# Patient Record
Sex: FEMALE | Race: WHITE | Employment: UNEMPLOYED | URBAN - METROPOLITAN AREA
[De-identification: names, ages, dates, MRNs, and addresses within clinical notes are randomized per-mention and may not be internally consistent; named-entity substitution may affect disease eponyms.]

---

## 2021-08-01 ENCOUNTER — APPOINTMENT (OUTPATIENT)
Dept: GENERAL RADIOLOGY | Age: 66
DRG: 234 | End: 2021-08-01
Attending: PHYSICIAN ASSISTANT
Payer: COMMERCIAL

## 2021-08-01 ENCOUNTER — HOSPITAL ENCOUNTER (OUTPATIENT)
Dept: NON INVASIVE DIAGNOSTICS | Age: 66
Discharge: HOME | End: 2021-08-01
Attending: THORACIC SURGERY (CARDIOTHORACIC VASCULAR SURGERY)

## 2021-08-01 ENCOUNTER — ANESTHESIA EVENT (OUTPATIENT)
Dept: CARDIOTHORACIC SURGERY | Age: 66
DRG: 234 | End: 2021-08-01
Payer: COMMERCIAL

## 2021-08-01 ENCOUNTER — HOSPITAL ENCOUNTER (INPATIENT)
Age: 66
LOS: 5 days | Discharge: HOME HEALTH | DRG: 234 | End: 2021-08-06
Attending: EMERGENCY MEDICINE | Admitting: THORACIC SURGERY (CARDIOTHORACIC VASCULAR SURGERY)
Payer: COMMERCIAL

## 2021-08-01 ENCOUNTER — APPOINTMENT (OUTPATIENT)
Dept: GENERAL RADIOLOGY | Age: 66
DRG: 234 | End: 2021-08-01
Attending: EMERGENCY MEDICINE
Payer: COMMERCIAL

## 2021-08-01 ENCOUNTER — ANESTHESIA (OUTPATIENT)
Dept: CARDIOTHORACIC SURGERY | Age: 66
DRG: 234 | End: 2021-08-01
Payer: COMMERCIAL

## 2021-08-01 DIAGNOSIS — I21.3 ST ELEVATION MYOCARDIAL INFARCTION (STEMI), UNSPECIFIED ARTERY (HCC): Primary | ICD-10-CM

## 2021-08-01 DIAGNOSIS — I25.119 CORONARY ARTERY DISEASE INVOLVING NATIVE HEART WITH ANGINA PECTORIS, UNSPECIFIED VESSEL OR LESION TYPE (HCC): ICD-10-CM

## 2021-08-01 DIAGNOSIS — Z95.1 S/P CABG X 2: ICD-10-CM

## 2021-08-01 PROBLEM — I25.10 CAD (CORONARY ARTERY DISEASE): Status: ACTIVE | Noted: 2021-08-01

## 2021-08-01 LAB
ADMINISTERED INITIALS, ADMINIT: NORMAL
ALBUMIN SERPL-MCNC: 3 G/DL (ref 3.5–5)
ALBUMIN SERPL-MCNC: 3.9 G/DL (ref 3.5–5)
ALBUMIN SERPL-MCNC: 4.3 G/DL (ref 3.5–5)
ALBUMIN/GLOB SERPL: 1.2 {RATIO} (ref 1.1–2.2)
ALBUMIN/GLOB SERPL: 1.7 {RATIO} (ref 1.1–2.2)
ALBUMIN/GLOB SERPL: 2 {RATIO} (ref 1.1–2.2)
ALP SERPL-CCNC: 44 U/L (ref 45–117)
ALP SERPL-CCNC: 47 U/L (ref 45–117)
ALP SERPL-CCNC: 87 U/L (ref 45–117)
ALT SERPL-CCNC: 24 U/L (ref 12–78)
ALT SERPL-CCNC: 28 U/L (ref 12–78)
ALT SERPL-CCNC: 33 U/L (ref 12–78)
ANION GAP SERPL CALC-SCNC: 5 MMOL/L (ref 5–15)
ANION GAP SERPL CALC-SCNC: 6 MMOL/L (ref 5–15)
ANION GAP SERPL CALC-SCNC: 8 MMOL/L (ref 5–15)
ANION GAP SERPL CALC-SCNC: 8 MMOL/L (ref 5–15)
APTT PPP: 23.5 SEC (ref 22.1–31)
APTT PPP: >130 SEC (ref 22.1–31)
ARTERIAL PATENCY WRIST A: ABNORMAL
AST SERPL-CCNC: 131 U/L (ref 15–37)
AST SERPL-CCNC: 25 U/L (ref 15–37)
AST SERPL-CCNC: 79 U/L (ref 15–37)
BASE DEFICIT BLDA-SCNC: 6.9 MMOL/L
BASOPHILS # BLD: 0 K/UL (ref 0–0.1)
BASOPHILS # BLD: 0.1 K/UL (ref 0–0.1)
BASOPHILS # BLD: 0.1 K/UL (ref 0–0.1)
BASOPHILS NFR BLD: 0 % (ref 0–1)
BASOPHILS NFR BLD: 0 % (ref 0–1)
BASOPHILS NFR BLD: 1 % (ref 0–1)
BDY SITE: ABNORMAL
BDY SITE: ABNORMAL
BILIRUB SERPL-MCNC: 0.2 MG/DL (ref 0.2–1)
BILIRUB SERPL-MCNC: 0.2 MG/DL (ref 0.2–1)
BILIRUB SERPL-MCNC: 0.3 MG/DL (ref 0.2–1)
BUN SERPL-MCNC: 12 MG/DL (ref 6–20)
BUN SERPL-MCNC: 15 MG/DL (ref 6–20)
BUN/CREAT SERPL: 14 (ref 12–20)
BUN/CREAT SERPL: 16 (ref 12–20)
CA-I BLD-SCNC: 1 MMOL/L (ref 1.13–1.32)
CALCIUM SERPL-MCNC: 7.1 MG/DL (ref 8.5–10.1)
CALCIUM SERPL-MCNC: 8.9 MG/DL (ref 8.5–10.1)
CALCIUM SERPL-MCNC: 9 MG/DL (ref 8.5–10.1)
CALCIUM SERPL-MCNC: 9.9 MG/DL (ref 8.5–10.1)
CHLORIDE SERPL-SCNC: 106 MMOL/L (ref 97–108)
CHLORIDE SERPL-SCNC: 114 MMOL/L (ref 97–108)
CHLORIDE SERPL-SCNC: 114 MMOL/L (ref 97–108)
CHLORIDE SERPL-SCNC: 115 MMOL/L (ref 97–108)
CO2 SERPL-SCNC: 22 MMOL/L (ref 21–32)
CO2 SERPL-SCNC: 22 MMOL/L (ref 21–32)
CO2 SERPL-SCNC: 25 MMOL/L (ref 21–32)
CO2 SERPL-SCNC: 25 MMOL/L (ref 21–32)
COHGB MFR BLD: 0.7 % (ref 1–2)
CREAT SERPL-MCNC: 0.84 MG/DL (ref 0.55–1.02)
CREAT SERPL-MCNC: 0.85 MG/DL (ref 0.55–1.02)
CREAT SERPL-MCNC: 0.88 MG/DL (ref 0.55–1.02)
CREAT SERPL-MCNC: 0.91 MG/DL (ref 0.55–1.02)
D50 ADMINISTERED, D50ADM: 0 ML
D50 ORDER, D50ORD: 0 ML
DIFFERENTIAL METHOD BLD: ABNORMAL
EOSINOPHIL # BLD: 0 K/UL (ref 0–0.4)
EOSINOPHIL # BLD: 0.1 K/UL (ref 0–0.4)
EOSINOPHIL # BLD: 0.2 K/UL (ref 0–0.4)
EOSINOPHIL NFR BLD: 0 % (ref 0–7)
EOSINOPHIL NFR BLD: 1 % (ref 0–7)
EOSINOPHIL NFR BLD: 1 % (ref 0–7)
ERYTHROCYTE [DISTWIDTH] IN BLOOD BY AUTOMATED COUNT: 12.4 % (ref 11.5–14.5)
ERYTHROCYTE [DISTWIDTH] IN BLOOD BY AUTOMATED COUNT: 12.6 % (ref 11.5–14.5)
ERYTHROCYTE [DISTWIDTH] IN BLOOD BY AUTOMATED COUNT: 12.7 % (ref 11.5–14.5)
ERYTHROCYTE [DISTWIDTH] IN BLOOD BY AUTOMATED COUNT: 12.7 % (ref 11.5–14.5)
GLOBULIN SER CALC-MCNC: 1.8 G/DL (ref 2–4)
GLOBULIN SER CALC-MCNC: 2 G/DL (ref 2–4)
GLOBULIN SER CALC-MCNC: 3.7 G/DL (ref 2–4)
GLSCOM COMMENTS: NORMAL
GLUCOSE BLD STRIP.AUTO-MCNC: 103 MG/DL (ref 65–117)
GLUCOSE BLD STRIP.AUTO-MCNC: 104 MG/DL (ref 65–117)
GLUCOSE BLD STRIP.AUTO-MCNC: 105 MG/DL (ref 65–117)
GLUCOSE BLD STRIP.AUTO-MCNC: 105 MG/DL (ref 65–117)
GLUCOSE BLD STRIP.AUTO-MCNC: 118 MG/DL (ref 65–117)
GLUCOSE BLD STRIP.AUTO-MCNC: 138 MG/DL (ref 65–117)
GLUCOSE BLD STRIP.AUTO-MCNC: 164 MG/DL (ref 65–117)
GLUCOSE SERPL-MCNC: 112 MG/DL (ref 65–100)
GLUCOSE SERPL-MCNC: 124 MG/DL (ref 65–100)
GLUCOSE SERPL-MCNC: 126 MG/DL (ref 65–100)
GLUCOSE SERPL-MCNC: 98 MG/DL (ref 65–100)
GLUCOSE, GLC: 103 MG/DL
GLUCOSE, GLC: 104 MG/DL
GLUCOSE, GLC: 105 MG/DL
GLUCOSE, GLC: 105 MG/DL
GLUCOSE, GLC: 118 MG/DL
GLUCOSE, GLC: 138 MG/DL
GLUCOSE, GLC: 164 MG/DL
HCO3 BLDA-SCNC: 19 MMOL/L (ref 22–26)
HCT VFR BLD AUTO: 26 % (ref 35–47)
HCT VFR BLD AUTO: 26.2 % (ref 35–47)
HCT VFR BLD AUTO: 27.5 % (ref 35–47)
HCT VFR BLD AUTO: 41.3 % (ref 35–47)
HGB BLD OXIMETRY-MCNC: 8.3 G/DL (ref 14–17)
HGB BLD-MCNC: 13.5 G/DL (ref 11.5–16)
HGB BLD-MCNC: 8.4 G/DL (ref 11.5–16)
HGB BLD-MCNC: 8.6 G/DL (ref 11.5–16)
HGB BLD-MCNC: 9 G/DL (ref 11.5–16)
HIGH TARGET, HITG: 130 MG/DL
HISTORY CHECKED?,CKHIST: NORMAL
IMM GRANULOCYTES # BLD AUTO: 0 K/UL (ref 0–0.04)
IMM GRANULOCYTES # BLD AUTO: 0.1 K/UL (ref 0–0.04)
IMM GRANULOCYTES # BLD AUTO: 0.2 K/UL (ref 0–0.04)
IMM GRANULOCYTES NFR BLD AUTO: 0 % (ref 0–0.5)
IMM GRANULOCYTES NFR BLD AUTO: 1 % (ref 0–0.5)
IMM GRANULOCYTES NFR BLD AUTO: 1 % (ref 0–0.5)
INR PPP: 1.2 (ref 0.9–1.1)
INSULIN ADMINSTERED, INSADM: 1.7 UNITS/HOUR
INSULIN ADMINSTERED, INSADM: 1.8 UNITS/HOUR
INSULIN ADMINSTERED, INSADM: 2.3 UNITS/HOUR
INSULIN ADMINSTERED, INSADM: 2.3 UNITS/HOUR
INSULIN ADMINSTERED, INSADM: 4.2 UNITS/HOUR
INSULIN ORDER, INSORD: 1.7 UNITS/HOUR
INSULIN ORDER, INSORD: 1.8 UNITS/HOUR
INSULIN ORDER, INSORD: 2.3 UNITS/HOUR
INSULIN ORDER, INSORD: 2.3 UNITS/HOUR
INSULIN ORDER, INSORD: 4.2 UNITS/HOUR
LOW TARGET, LOT: 95 MG/DL
LYMPHOCYTES # BLD: 0.9 K/UL (ref 0.8–3.5)
LYMPHOCYTES # BLD: 1.2 K/UL (ref 0.8–3.5)
LYMPHOCYTES # BLD: 1.7 K/UL (ref 0.8–3.5)
LYMPHOCYTES NFR BLD: 10 % (ref 12–49)
LYMPHOCYTES NFR BLD: 11 % (ref 12–49)
LYMPHOCYTES NFR BLD: 5 % (ref 12–49)
MAGNESIUM SERPL-MCNC: 2.6 MG/DL (ref 1.6–2.4)
MAGNESIUM SERPL-MCNC: 3 MG/DL (ref 1.6–2.4)
MAGNESIUM SERPL-MCNC: 3.2 MG/DL (ref 1.6–2.4)
MCH RBC QN AUTO: 31 PG (ref 26–34)
MCH RBC QN AUTO: 31.5 PG (ref 26–34)
MCH RBC QN AUTO: 31.5 PG (ref 26–34)
MCH RBC QN AUTO: 31.8 PG (ref 26–34)
MCHC RBC AUTO-ENTMCNC: 32.3 G/DL (ref 30–36.5)
MCHC RBC AUTO-ENTMCNC: 32.7 G/DL (ref 30–36.5)
MCHC RBC AUTO-ENTMCNC: 32.7 G/DL (ref 30–36.5)
MCHC RBC AUTO-ENTMCNC: 32.8 G/DL (ref 30–36.5)
MCV RBC AUTO: 94.7 FL (ref 80–99)
MCV RBC AUTO: 96 FL (ref 80–99)
MCV RBC AUTO: 97.2 FL (ref 80–99)
MCV RBC AUTO: 97.4 FL (ref 80–99)
METHGB MFR BLD: 0.2 % (ref 0–1.4)
MINUTES UNTIL NEXT BG, NBG: 60 MIN
MONOCYTES # BLD: 1 K/UL (ref 0–1)
MONOCYTES # BLD: 1.4 K/UL (ref 0–1)
MONOCYTES # BLD: 2.2 K/UL (ref 0–1)
MONOCYTES NFR BLD: 12 % (ref 5–13)
MONOCYTES NFR BLD: 8 % (ref 5–13)
MONOCYTES NFR BLD: 9 % (ref 5–13)
MULTIPLIER, MUL: 0.03
MULTIPLIER, MUL: 0.04
NEUTS SEG # BLD: 14.1 K/UL (ref 1.8–8)
NEUTS SEG # BLD: 15.3 K/UL (ref 1.8–8)
NEUTS SEG # BLD: 9 K/UL (ref 1.8–8)
NEUTS SEG NFR BLD: 78 % (ref 32–75)
NEUTS SEG NFR BLD: 80 % (ref 32–75)
NEUTS SEG NFR BLD: 82 % (ref 32–75)
NRBC # BLD: 0 K/UL (ref 0–0.01)
NRBC BLD-RTO: 0 PER 100 WBC
ORDER INITIALS, ORDINIT: NORMAL
OXYHGB MFR BLD: 86.3 % (ref 94–97)
PCO2 BLDA: 40 MMHG (ref 35–45)
PH BLDA: 7.3 [PH] (ref 7.35–7.45)
PLATELET # BLD AUTO: 156 K/UL (ref 150–400)
PLATELET # BLD AUTO: 167 K/UL (ref 150–400)
PLATELET # BLD AUTO: 170 K/UL (ref 150–400)
PLATELET # BLD AUTO: 277 K/UL (ref 150–400)
PMV BLD AUTO: 10.9 FL (ref 8.9–12.9)
PMV BLD AUTO: 11.1 FL (ref 8.9–12.9)
PMV BLD AUTO: 11.3 FL (ref 8.9–12.9)
PMV BLD AUTO: 11.4 FL (ref 8.9–12.9)
PO2 BLDA: 315 MMHG (ref 80–100)
POTASSIUM SERPL-SCNC: 3.9 MMOL/L (ref 3.5–5.1)
POTASSIUM SERPL-SCNC: 4 MMOL/L (ref 3.5–5.1)
POTASSIUM SERPL-SCNC: 4.1 MMOL/L (ref 3.5–5.1)
POTASSIUM SERPL-SCNC: 4.1 MMOL/L (ref 3.5–5.1)
PROT SERPL-MCNC: 4.8 G/DL (ref 6.4–8.2)
PROT SERPL-MCNC: 5.9 G/DL (ref 6.4–8.2)
PROT SERPL-MCNC: 8 G/DL (ref 6.4–8.2)
PROTHROMBIN TIME: 12.7 SEC (ref 9–11.1)
RBC # BLD AUTO: 2.67 M/UL (ref 3.8–5.2)
RBC # BLD AUTO: 2.73 M/UL (ref 3.8–5.2)
RBC # BLD AUTO: 2.83 M/UL (ref 3.8–5.2)
RBC # BLD AUTO: 4.36 M/UL (ref 3.8–5.2)
RBC MORPH BLD: ABNORMAL
SAO2 % BLD: 100 % (ref 92–97)
SAO2 % BLD: 87 % (ref 95–99)
SAO2% DEVICE SAO2% SENSOR NAME: ABNORMAL
SERVICE CMNT-IMP: ABNORMAL
SERVICE CMNT-IMP: NORMAL
SODIUM SERPL-SCNC: 139 MMOL/L (ref 136–145)
SODIUM SERPL-SCNC: 143 MMOL/L (ref 136–145)
SODIUM SERPL-SCNC: 144 MMOL/L (ref 136–145)
SODIUM SERPL-SCNC: 144 MMOL/L (ref 136–145)
SPECIMEN SITE: ABNORMAL
SPECIMEN SITE: ABNORMAL
THERAPEUTIC RANGE,PTTT: ABNORMAL SECS (ref 58–77)
THERAPEUTIC RANGE,PTTT: NORMAL SECS (ref 58–77)
TROPONIN I BLD-MCNC: 0.43 NG/ML (ref 0–0.08)
TROPONIN I SERPL-MCNC: 0.68 NG/ML
WBC # BLD AUTO: 11.4 K/UL (ref 3.6–11)
WBC # BLD AUTO: 17.5 K/UL (ref 3.6–11)
WBC # BLD AUTO: 18.1 K/UL (ref 3.6–11)
WBC # BLD AUTO: 18.6 K/UL (ref 3.6–11)

## 2021-08-01 PROCEDURE — 33517 CABG ARTERY-VEIN SINGLE: CPT | Performed by: PHYSICIAN ASSISTANT

## 2021-08-01 PROCEDURE — 77030041076 HC DRSG AG OPTICELL MDII -A: Performed by: THORACIC SURGERY (CARDIOTHORACIC VASCULAR SURGERY)

## 2021-08-01 PROCEDURE — 74011250636 HC RX REV CODE- 250/636: Performed by: THORACIC SURGERY (CARDIOTHORACIC VASCULAR SURGERY)

## 2021-08-01 PROCEDURE — 85730 THROMBOPLASTIN TIME PARTIAL: CPT

## 2021-08-01 PROCEDURE — B2111ZZ FLUOROSCOPY OF MULTIPLE CORONARY ARTERIES USING LOW OSMOLAR CONTRAST: ICD-10-PCS | Performed by: INTERNAL MEDICINE

## 2021-08-01 PROCEDURE — 77030008771 HC TU NG SALEM SUMP -A: Performed by: ANESTHESIOLOGY

## 2021-08-01 PROCEDURE — 31500 INSERT EMERGENCY AIRWAY: CPT

## 2021-08-01 PROCEDURE — 74011000258 HC RX REV CODE- 258: Performed by: THORACIC SURGERY (CARDIOTHORACIC VASCULAR SURGERY)

## 2021-08-01 PROCEDURE — 33517 CABG ARTERY-VEIN SINGLE: CPT | Performed by: THORACIC SURGERY (CARDIOTHORACIC VASCULAR SURGERY)

## 2021-08-01 PROCEDURE — 77030002933 HC SUT MCRYL J&J -A: Performed by: THORACIC SURGERY (CARDIOTHORACIC VASCULAR SURGERY)

## 2021-08-01 PROCEDURE — 74011000250 HC RX REV CODE- 250: Performed by: INTERNAL MEDICINE

## 2021-08-01 PROCEDURE — 74011250636 HC RX REV CODE- 250/636: Performed by: NURSE PRACTITIONER

## 2021-08-01 PROCEDURE — 77030008684 HC TU ET CUF COVD -B: Performed by: ANESTHESIOLOGY

## 2021-08-01 PROCEDURE — 99222 1ST HOSP IP/OBS MODERATE 55: CPT | Performed by: THORACIC SURGERY (CARDIOTHORACIC VASCULAR SURGERY)

## 2021-08-01 PROCEDURE — 77030019579 HC CBL PACE DISP REMG -B: Performed by: THORACIC SURGERY (CARDIOTHORACIC VASCULAR SURGERY)

## 2021-08-01 PROCEDURE — 77030041244 HC CBL PACE EXT TEMP REMG -B: Performed by: THORACIC SURGERY (CARDIOTHORACIC VASCULAR SURGERY)

## 2021-08-01 PROCEDURE — 77030008517 HC TBNG INSUF ENDO STOR -B: Performed by: THORACIC SURGERY (CARDIOTHORACIC VASCULAR SURGERY)

## 2021-08-01 PROCEDURE — 93460 R&L HRT ART/VENTRICLE ANGIO: CPT | Performed by: INTERNAL MEDICINE

## 2021-08-01 PROCEDURE — 4A023N8 MEASUREMENT OF CARDIAC SAMPLING AND PRESSURE, BILATERAL, PERCUTANEOUS APPROACH: ICD-10-PCS | Performed by: INTERNAL MEDICINE

## 2021-08-01 PROCEDURE — 76937 US GUIDE VASCULAR ACCESS: CPT | Performed by: INTERNAL MEDICINE

## 2021-08-01 PROCEDURE — 93005 ELECTROCARDIOGRAM TRACING: CPT

## 2021-08-01 PROCEDURE — 96375 TX/PRO/DX INJ NEW DRUG ADDON: CPT

## 2021-08-01 PROCEDURE — 2709999900 HC NON-CHARGEABLE SUPPLY: Performed by: THORACIC SURGERY (CARDIOTHORACIC VASCULAR SURGERY)

## 2021-08-01 PROCEDURE — 71045 X-RAY EXAM CHEST 1 VIEW: CPT

## 2021-08-01 PROCEDURE — 65620000000 HC RM CCU GENERAL

## 2021-08-01 PROCEDURE — 77030019697 HC SYR ANGI INFL MRTM -B: Performed by: INTERNAL MEDICINE

## 2021-08-01 PROCEDURE — 77030006689 HC BLD OPHTH BVR BD -A: Performed by: THORACIC SURGERY (CARDIOTHORACIC VASCULAR SURGERY)

## 2021-08-01 PROCEDURE — 82330 ASSAY OF CALCIUM: CPT

## 2021-08-01 PROCEDURE — 77030031403 HC IMPL MRKR GRFT CT SCAN -B: Performed by: THORACIC SURGERY (CARDIOTHORACIC VASCULAR SURGERY)

## 2021-08-01 PROCEDURE — 74011000636 HC RX REV CODE- 636: Performed by: INTERNAL MEDICINE

## 2021-08-01 PROCEDURE — 99153 MOD SED SAME PHYS/QHP EA: CPT | Performed by: INTERNAL MEDICINE

## 2021-08-01 PROCEDURE — 76010000110 HC CV SURG 3 TO 3.5 HR: Performed by: THORACIC SURGERY (CARDIOTHORACIC VASCULAR SURGERY)

## 2021-08-01 PROCEDURE — 74011250636 HC RX REV CODE- 250/636: Performed by: NURSE ANESTHETIST, CERTIFIED REGISTERED

## 2021-08-01 PROCEDURE — 74011000250 HC RX REV CODE- 250: Performed by: PHYSICIAN ASSISTANT

## 2021-08-01 PROCEDURE — 77030012390 HC DRN CHST BTL GTNG -B: Performed by: THORACIC SURGERY (CARDIOTHORACIC VASCULAR SURGERY)

## 2021-08-01 PROCEDURE — 94002 VENT MGMT INPAT INIT DAY: CPT

## 2021-08-01 PROCEDURE — 77030002987 HC SUT PROL J&J -B: Performed by: THORACIC SURGERY (CARDIOTHORACIC VASCULAR SURGERY)

## 2021-08-01 PROCEDURE — 74011250636 HC RX REV CODE- 250/636: Performed by: PHYSICIAN ASSISTANT

## 2021-08-01 PROCEDURE — C1894 INTRO/SHEATH, NON-LASER: HCPCS | Performed by: INTERNAL MEDICINE

## 2021-08-01 PROCEDURE — 96374 THER/PROPH/DIAG INJ IV PUSH: CPT

## 2021-08-01 PROCEDURE — 77030040361 HC SLV COMPR DVT MDII -B

## 2021-08-01 PROCEDURE — 03HY32Z INSERTION OF MONITORING DEVICE INTO UPPER ARTERY, PERCUTANEOUS APPROACH: ICD-10-PCS | Performed by: ANESTHESIOLOGY

## 2021-08-01 PROCEDURE — 77030005513 HC CATH URETH FOL11 MDII -B: Performed by: THORACIC SURGERY (CARDIOTHORACIC VASCULAR SURGERY)

## 2021-08-01 PROCEDURE — 77030022199 HC SYS HARV VESL GTNG -G1: Performed by: THORACIC SURGERY (CARDIOTHORACIC VASCULAR SURGERY)

## 2021-08-01 PROCEDURE — 74011000250 HC RX REV CODE- 250: Performed by: THORACIC SURGERY (CARDIOTHORACIC VASCULAR SURGERY)

## 2021-08-01 PROCEDURE — 80053 COMPREHEN METABOLIC PANEL: CPT

## 2021-08-01 PROCEDURE — 33508 ENDOSCOPIC VEIN HARVEST: CPT | Performed by: PHYSICIAN ASSISTANT

## 2021-08-01 PROCEDURE — 85025 COMPLETE CBC W/AUTO DIFF WBC: CPT

## 2021-08-01 PROCEDURE — 84484 ASSAY OF TROPONIN QUANT: CPT

## 2021-08-01 PROCEDURE — 36415 COLL VENOUS BLD VENIPUNCTURE: CPT

## 2021-08-01 PROCEDURE — 77030010605 HC BLWR MR MAL MEDT -B: Performed by: THORACIC SURGERY (CARDIOTHORACIC VASCULAR SURGERY)

## 2021-08-01 PROCEDURE — 33508 ENDOSCOPIC VEIN HARVEST: CPT | Performed by: THORACIC SURGERY (CARDIOTHORACIC VASCULAR SURGERY)

## 2021-08-01 PROCEDURE — 77030010818: Performed by: THORACIC SURGERY (CARDIOTHORACIC VASCULAR SURGERY)

## 2021-08-01 PROCEDURE — 2709999900 HC NON-CHARGEABLE SUPPLY: Performed by: INTERNAL MEDICINE

## 2021-08-01 PROCEDURE — 74011250637 HC RX REV CODE- 250/637: Performed by: EMERGENCY MEDICINE

## 2021-08-01 PROCEDURE — 33967 INSERT I-AORT PERCUT DEVICE: CPT | Performed by: INTERNAL MEDICINE

## 2021-08-01 PROCEDURE — 96365 THER/PROPH/DIAG IV INF INIT: CPT

## 2021-08-01 PROCEDURE — 77030010938 HC CLP LIG TELE -A: Performed by: THORACIC SURGERY (CARDIOTHORACIC VASCULAR SURGERY)

## 2021-08-01 PROCEDURE — 5A02210 ASSISTANCE WITH CARDIAC OUTPUT USING BALLOON PUMP, CONTINUOUS: ICD-10-PCS | Performed by: INTERNAL MEDICINE

## 2021-08-01 PROCEDURE — 77030013798 HC KT TRNSDUC PRSSR EDWD -B: Performed by: THORACIC SURGERY (CARDIOTHORACIC VASCULAR SURGERY)

## 2021-08-01 PROCEDURE — 74011000250 HC RX REV CODE- 250: Performed by: NURSE ANESTHETIST, CERTIFIED REGISTERED

## 2021-08-01 PROCEDURE — 85027 COMPLETE CBC AUTOMATED: CPT

## 2021-08-01 PROCEDURE — C1751 CATH, INF, PER/CENT/MIDLINE: HCPCS

## 2021-08-01 PROCEDURE — 33533 CABG ARTERIAL SINGLE: CPT | Performed by: PHYSICIAN ASSISTANT

## 2021-08-01 PROCEDURE — 74011000250 HC RX REV CODE- 250: Performed by: EMERGENCY MEDICINE

## 2021-08-01 PROCEDURE — P9047 ALBUMIN (HUMAN), 25%, 50ML: HCPCS | Performed by: THORACIC SURGERY (CARDIOTHORACIC VASCULAR SURGERY)

## 2021-08-01 PROCEDURE — 86901 BLOOD TYPING SEROLOGIC RH(D): CPT

## 2021-08-01 PROCEDURE — 77030006920 HC BLD STRNL SAW STRY -B: Performed by: THORACIC SURGERY (CARDIOTHORACIC VASCULAR SURGERY)

## 2021-08-01 PROCEDURE — 33533 CABG ARTERIAL SINGLE: CPT | Performed by: THORACIC SURGERY (CARDIOTHORACIC VASCULAR SURGERY)

## 2021-08-01 PROCEDURE — 85610 PROTHROMBIN TIME: CPT

## 2021-08-01 PROCEDURE — 77030002986 HC SUT PROL J&J -A: Performed by: THORACIC SURGERY (CARDIOTHORACIC VASCULAR SURGERY)

## 2021-08-01 PROCEDURE — 77030010389 HC WRE ATR PACE AEMC -B: Performed by: THORACIC SURGERY (CARDIOTHORACIC VASCULAR SURGERY)

## 2021-08-01 PROCEDURE — 83050 HGB METHEMOGLOBIN QUAN: CPT

## 2021-08-01 PROCEDURE — 99152 MOD SED SAME PHYS/QHP 5/>YRS: CPT | Performed by: INTERNAL MEDICINE

## 2021-08-01 PROCEDURE — C1751 CATH, INF, PER/CENT/MIDLINE: HCPCS | Performed by: THORACIC SURGERY (CARDIOTHORACIC VASCULAR SURGERY)

## 2021-08-01 PROCEDURE — 77030003010 HC SUT SURG STL J&J -B: Performed by: THORACIC SURGERY (CARDIOTHORACIC VASCULAR SURGERY)

## 2021-08-01 PROCEDURE — 74011250637 HC RX REV CODE- 250/637: Performed by: PHYSICIAN ASSISTANT

## 2021-08-01 PROCEDURE — 77030004549 HC CATH ANGI DX PRF MRTM -A: Performed by: INTERNAL MEDICINE

## 2021-08-01 PROCEDURE — 77030005402 HC CATH RAD ART LN KT TELE -B

## 2021-08-01 PROCEDURE — 86923 COMPATIBILITY TEST ELECTRIC: CPT

## 2021-08-01 PROCEDURE — 2709999900 HC NON-CHARGEABLE SUPPLY

## 2021-08-01 PROCEDURE — P9045 ALBUMIN (HUMAN), 5%, 250 ML: HCPCS | Performed by: NURSE PRACTITIONER

## 2021-08-01 PROCEDURE — 77030018729 HC ELECTRD DEFIB PAD CARD -B: Performed by: THORACIC SURGERY (CARDIOTHORACIC VASCULAR SURGERY)

## 2021-08-01 PROCEDURE — 77030006247 HC LD PCMKR MYOCRD BPLR TEMP MEDT -B: Performed by: THORACIC SURGERY (CARDIOTHORACIC VASCULAR SURGERY)

## 2021-08-01 PROCEDURE — 77030020263 HC SOL INJ SOD CL0.9% LFCR 1000ML: Performed by: THORACIC SURGERY (CARDIOTHORACIC VASCULAR SURGERY)

## 2021-08-01 PROCEDURE — 77030040504 HC DRN WND MDII -B: Performed by: THORACIC SURGERY (CARDIOTHORACIC VASCULAR SURGERY)

## 2021-08-01 PROCEDURE — 02HV33Z INSERTION OF INFUSION DEVICE INTO SUPERIOR VENA CAVA, PERCUTANEOUS APPROACH: ICD-10-PCS | Performed by: ANESTHESIOLOGY

## 2021-08-01 PROCEDURE — 74011636637 HC RX REV CODE- 636/637: Performed by: THORACIC SURGERY (CARDIOTHORACIC VASCULAR SURGERY)

## 2021-08-01 PROCEDURE — P9045 ALBUMIN (HUMAN), 5%, 250 ML: HCPCS | Performed by: PHYSICIAN ASSISTANT

## 2021-08-01 PROCEDURE — 77030034888 HC SUT PROL 2 J&J -B: Performed by: THORACIC SURGERY (CARDIOTHORACIC VASCULAR SURGERY)

## 2021-08-01 PROCEDURE — 74011250636 HC RX REV CODE- 250/636: Performed by: INTERNAL MEDICINE

## 2021-08-01 PROCEDURE — 77030037878 HC DRSG MEPILEX >48IN BORD MOLN -B: Performed by: THORACIC SURGERY (CARDIOTHORACIC VASCULAR SURGERY)

## 2021-08-01 PROCEDURE — 74011250636 HC RX REV CODE- 250/636: Performed by: EMERGENCY MEDICINE

## 2021-08-01 PROCEDURE — 99285 EMERGENCY DEPT VISIT HI MDM: CPT

## 2021-08-01 PROCEDURE — 83735 ASSAY OF MAGNESIUM: CPT

## 2021-08-01 PROCEDURE — 76060000037 HC ANESTHESIA 3 TO 3.5 HR: Performed by: THORACIC SURGERY (CARDIOTHORACIC VASCULAR SURGERY)

## 2021-08-01 PROCEDURE — 77030016151 HC PROTCTR LNS DFOG COVD -B: Performed by: THORACIC SURGERY (CARDIOTHORACIC VASCULAR SURGERY)

## 2021-08-01 PROCEDURE — 77030020061 HC IV BLD WRMR ADMIN SET 3M -B: Performed by: ANESTHESIOLOGY

## 2021-08-01 PROCEDURE — 77030016754 HC CATH BLN INTAORT1 GTNG -G: Performed by: INTERNAL MEDICINE

## 2021-08-01 PROCEDURE — 77030027138 HC INCENT SPIROMETER -A

## 2021-08-01 PROCEDURE — 82962 GLUCOSE BLOOD TEST: CPT

## 2021-08-01 PROCEDURE — 77030013861 HC PNCH AORT CLNCUT QUES -B: Performed by: THORACIC SURGERY (CARDIOTHORACIC VASCULAR SURGERY)

## 2021-08-01 PROCEDURE — 77030003020 HC SUT TICRN COVD -A: Performed by: THORACIC SURGERY (CARDIOTHORACIC VASCULAR SURGERY)

## 2021-08-01 PROCEDURE — 77030008771 HC TU NG SALEM SUMP -A: Performed by: THORACIC SURGERY (CARDIOTHORACIC VASCULAR SURGERY)

## 2021-08-01 PROCEDURE — 77030026438 HC STYL ET INTUB CARD -A: Performed by: ANESTHESIOLOGY

## 2021-08-01 DEVICE — 6 FOOT DISPOSABLE EXTENSION CABLE WITH SAFE CONNECT / SCREW-DOWN: Type: IMPLANTABLE DEVICE | Status: FUNCTIONAL

## 2021-08-01 DEVICE — TEMP PACING WIRE
Type: IMPLANTABLE DEVICE | Status: FUNCTIONAL
Brand: MYO/WIRE

## 2021-08-01 DEVICE — LEAD PCMKR MYOCARDL BPLR TEMP. --: Type: IMPLANTABLE DEVICE | Status: FUNCTIONAL

## 2021-08-01 RX ORDER — DEXTROSE 50 % IN WATER (D50W) INTRAVENOUS SYRINGE
25-50 AS NEEDED
Status: DISCONTINUED | OUTPATIENT
Start: 2021-08-01 | End: 2021-08-02 | Stop reason: SDUPTHER

## 2021-08-01 RX ORDER — NITROGLYCERIN 0.4 MG/1
0.4 TABLET SUBLINGUAL
Status: DISCONTINUED | OUTPATIENT
Start: 2021-08-01 | End: 2021-08-01

## 2021-08-01 RX ORDER — SODIUM CHLORIDE 9 MG/ML
INJECTION, SOLUTION INTRAVENOUS
Status: DISCONTINUED | OUTPATIENT
Start: 2021-08-01 | End: 2021-08-01 | Stop reason: HOSPADM

## 2021-08-01 RX ORDER — SODIUM CHLORIDE 0.9 % (FLUSH) 0.9 %
5-40 SYRINGE (ML) INJECTION AS NEEDED
Status: DISCONTINUED | OUTPATIENT
Start: 2021-08-01 | End: 2021-08-06 | Stop reason: HOSPADM

## 2021-08-01 RX ORDER — DEXMEDETOMIDINE HYDROCHLORIDE 4 UG/ML
.1-1.5 INJECTION, SOLUTION INTRAVENOUS
Status: DISCONTINUED | OUTPATIENT
Start: 2021-08-01 | End: 2021-08-02

## 2021-08-01 RX ORDER — DOBUTAMINE HYDROCHLORIDE 200 MG/100ML
0-10 INJECTION INTRAVENOUS
Status: DISCONTINUED | OUTPATIENT
Start: 2021-08-01 | End: 2021-08-03

## 2021-08-01 RX ORDER — ALBUMIN HUMAN 50 G/1000ML
12.5 SOLUTION INTRAVENOUS
Status: COMPLETED | OUTPATIENT
Start: 2021-08-01 | End: 2021-08-03

## 2021-08-01 RX ORDER — FENTANYL CITRATE 50 UG/ML
50 INJECTION, SOLUTION INTRAMUSCULAR; INTRAVENOUS
Status: COMPLETED | OUTPATIENT
Start: 2021-08-01 | End: 2021-08-01

## 2021-08-01 RX ORDER — SUFENTANIL CITRATE 50 UG/ML
INJECTION EPIDURAL; INTRAVENOUS
Status: DISCONTINUED | OUTPATIENT
Start: 2021-08-01 | End: 2021-08-01 | Stop reason: HOSPADM

## 2021-08-01 RX ORDER — ALBUTEROL SULFATE 0.83 MG/ML
2.5 SOLUTION RESPIRATORY (INHALATION)
Status: DISCONTINUED | OUTPATIENT
Start: 2021-08-01 | End: 2021-08-06 | Stop reason: HOSPADM

## 2021-08-01 RX ORDER — PAPAVERINE HYDROCHLORIDE 30 MG/ML
30 INJECTION INTRAMUSCULAR; INTRAVENOUS ONCE
Status: COMPLETED | OUTPATIENT
Start: 2021-08-01 | End: 2021-08-01

## 2021-08-01 RX ORDER — MIDAZOLAM HYDROCHLORIDE 1 MG/ML
1 INJECTION, SOLUTION INTRAMUSCULAR; INTRAVENOUS
Status: DISCONTINUED | OUTPATIENT
Start: 2021-08-01 | End: 2021-08-05

## 2021-08-01 RX ORDER — MAGNESIUM SULFATE 100 %
4 CRYSTALS MISCELLANEOUS AS NEEDED
Status: DISCONTINUED | OUTPATIENT
Start: 2021-08-01 | End: 2021-08-06 | Stop reason: HOSPADM

## 2021-08-01 RX ORDER — DESMOPRESSIN ACETATE 4 UG/ML
2 INJECTION, SOLUTION INTRAVENOUS; SUBCUTANEOUS ONCE
Status: DISCONTINUED | OUTPATIENT
Start: 2021-08-01 | End: 2021-08-01

## 2021-08-01 RX ORDER — DOPAMINE HYDROCHLORIDE 320 MG/100ML
0-20 INJECTION, SOLUTION INTRAVENOUS
Status: DISCONTINUED | OUTPATIENT
Start: 2021-08-01 | End: 2021-08-01

## 2021-08-01 RX ORDER — AMOXICILLIN 250 MG
1 CAPSULE ORAL 2 TIMES DAILY
Status: DISCONTINUED | OUTPATIENT
Start: 2021-08-02 | End: 2021-08-06 | Stop reason: HOSPADM

## 2021-08-01 RX ORDER — DEXTROSE 50 % IN WATER (D50W) INTRAVENOUS SYRINGE
12.5-25 AS NEEDED
Status: DISCONTINUED | OUTPATIENT
Start: 2021-08-01 | End: 2021-08-06 | Stop reason: HOSPADM

## 2021-08-01 RX ORDER — HEPARIN SODIUM 5000 [USP'U]/ML
INJECTION, SOLUTION INTRAVENOUS; SUBCUTANEOUS
Status: DISCONTINUED
Start: 2021-08-01 | End: 2021-08-01

## 2021-08-01 RX ORDER — MAGNESIUM SULFATE 1 G/100ML
1 INJECTION INTRAVENOUS AS NEEDED
Status: DISCONTINUED | OUTPATIENT
Start: 2021-08-01 | End: 2021-08-06 | Stop reason: HOSPADM

## 2021-08-01 RX ORDER — SODIUM BICARBONATE 1 MEQ/ML
50 SYRINGE (ML) INTRAVENOUS ONCE
Status: COMPLETED | OUTPATIENT
Start: 2021-08-01 | End: 2021-08-01

## 2021-08-01 RX ORDER — MORPHINE SULFATE 4 MG/ML
4 INJECTION INTRAVENOUS
Status: DISCONTINUED | OUTPATIENT
Start: 2021-08-01 | End: 2021-08-05

## 2021-08-01 RX ORDER — FACIAL-BODY WIPES
10 EACH TOPICAL DAILY PRN
Status: DISCONTINUED | OUTPATIENT
Start: 2021-08-01 | End: 2021-08-06 | Stop reason: HOSPADM

## 2021-08-01 RX ORDER — POTASSIUM CHLORIDE 29.8 MG/ML
20 INJECTION INTRAVENOUS
Status: ACTIVE | OUTPATIENT
Start: 2021-08-01 | End: 2021-08-02

## 2021-08-01 RX ORDER — OXYCODONE HYDROCHLORIDE 5 MG/1
10 TABLET ORAL
Status: DISCONTINUED | OUTPATIENT
Start: 2021-08-01 | End: 2021-08-06 | Stop reason: HOSPADM

## 2021-08-01 RX ORDER — LIDOCAINE HYDROCHLORIDE 10 MG/ML
INJECTION INFILTRATION; PERINEURAL AS NEEDED
Status: DISCONTINUED | OUTPATIENT
Start: 2021-08-01 | End: 2021-08-01 | Stop reason: HOSPADM

## 2021-08-01 RX ORDER — LANOLIN ALCOHOL/MO/W.PET/CERES
400 CREAM (GRAM) TOPICAL 2 TIMES DAILY
Status: DISCONTINUED | OUTPATIENT
Start: 2021-08-02 | End: 2021-08-06 | Stop reason: HOSPADM

## 2021-08-01 RX ORDER — CEFAZOLIN SODIUM 1 G/3ML
INJECTION, POWDER, FOR SOLUTION INTRAMUSCULAR; INTRAVENOUS AS NEEDED
Status: DISCONTINUED | OUTPATIENT
Start: 2021-08-01 | End: 2021-08-01 | Stop reason: HOSPADM

## 2021-08-01 RX ORDER — HEPARIN SODIUM 200 [USP'U]/100ML
INJECTION, SOLUTION INTRAVENOUS
Status: COMPLETED | OUTPATIENT
Start: 2021-08-01 | End: 2021-08-01

## 2021-08-01 RX ORDER — ROCURONIUM BROMIDE 10 MG/ML
INJECTION, SOLUTION INTRAVENOUS AS NEEDED
Status: DISCONTINUED | OUTPATIENT
Start: 2021-08-01 | End: 2021-08-01 | Stop reason: HOSPADM

## 2021-08-01 RX ORDER — PROPOFOL 10 MG/ML
0-50 VIAL (ML) INTRAVENOUS
Status: DISCONTINUED | OUTPATIENT
Start: 2021-08-01 | End: 2021-08-02

## 2021-08-01 RX ORDER — MIDAZOLAM HYDROCHLORIDE 1 MG/ML
INJECTION, SOLUTION INTRAMUSCULAR; INTRAVENOUS AS NEEDED
Status: DISCONTINUED | OUTPATIENT
Start: 2021-08-01 | End: 2021-08-01 | Stop reason: HOSPADM

## 2021-08-01 RX ORDER — HEPARIN SODIUM 10000 [USP'U]/100ML
12-25 INJECTION, SOLUTION INTRAVENOUS
Status: DISCONTINUED | OUTPATIENT
Start: 2021-08-01 | End: 2021-08-01 | Stop reason: SDUPTHER

## 2021-08-01 RX ORDER — GUAIFENESIN 100 MG/5ML
324 LIQUID (ML) ORAL
Status: COMPLETED | OUTPATIENT
Start: 2021-08-01 | End: 2021-08-01

## 2021-08-01 RX ORDER — LEVOTHYROXINE SODIUM 100 UG/1
100 TABLET ORAL
Status: DISCONTINUED | OUTPATIENT
Start: 2021-08-02 | End: 2021-08-06 | Stop reason: HOSPADM

## 2021-08-01 RX ORDER — LEVOTHYROXINE SODIUM 100 UG/1
100 TABLET ORAL
COMMUNITY

## 2021-08-01 RX ORDER — GABAPENTIN 100 MG/1
100 CAPSULE ORAL 3 TIMES DAILY
Status: DISCONTINUED | OUTPATIENT
Start: 2021-08-01 | End: 2021-08-03

## 2021-08-01 RX ORDER — ONDANSETRON 2 MG/ML
4 INJECTION INTRAMUSCULAR; INTRAVENOUS
Status: DISCONTINUED | OUTPATIENT
Start: 2021-08-01 | End: 2021-08-06 | Stop reason: HOSPADM

## 2021-08-01 RX ORDER — POTASSIUM CHLORIDE 29.8 MG/ML
20 INJECTION INTRAVENOUS ONCE
Status: DISCONTINUED | OUTPATIENT
Start: 2021-08-01 | End: 2021-08-01

## 2021-08-01 RX ORDER — LIDOCAINE HYDROCHLORIDE 20 MG/ML
INJECTION, SOLUTION EPIDURAL; INFILTRATION; INTRACAUDAL; PERINEURAL AS NEEDED
Status: DISCONTINUED | OUTPATIENT
Start: 2021-08-01 | End: 2021-08-01 | Stop reason: HOSPADM

## 2021-08-01 RX ORDER — ESMOLOL HYDROCHLORIDE 10 MG/ML
INJECTION INTRAVENOUS AS NEEDED
Status: DISCONTINUED | OUTPATIENT
Start: 2021-08-01 | End: 2021-08-01 | Stop reason: HOSPADM

## 2021-08-01 RX ORDER — GUAIFENESIN 100 MG/5ML
81 LIQUID (ML) ORAL DAILY
Status: DISCONTINUED | OUTPATIENT
Start: 2021-08-02 | End: 2021-08-06 | Stop reason: HOSPADM

## 2021-08-01 RX ORDER — ACETAMINOPHEN 500 MG
1000 TABLET ORAL EVERY 6 HOURS
Status: DISPENSED | OUTPATIENT
Start: 2021-08-01 | End: 2021-08-03

## 2021-08-01 RX ORDER — SUFENTANIL CITRATE 50 UG/ML
INJECTION EPIDURAL; INTRAVENOUS AS NEEDED
Status: DISCONTINUED | OUTPATIENT
Start: 2021-08-01 | End: 2021-08-01 | Stop reason: HOSPADM

## 2021-08-01 RX ORDER — SUCCINYLCHOLINE CHLORIDE 20 MG/ML
INJECTION INTRAMUSCULAR; INTRAVENOUS AS NEEDED
Status: DISCONTINUED | OUTPATIENT
Start: 2021-08-01 | End: 2021-08-01 | Stop reason: HOSPADM

## 2021-08-01 RX ORDER — FENTANYL CITRATE 50 UG/ML
INJECTION, SOLUTION INTRAMUSCULAR; INTRAVENOUS AS NEEDED
Status: DISCONTINUED | OUTPATIENT
Start: 2021-08-01 | End: 2021-08-01 | Stop reason: HOSPADM

## 2021-08-01 RX ORDER — PROTAMINE SULFATE 10 MG/ML
250 INJECTION, SOLUTION INTRAVENOUS
Status: COMPLETED | OUTPATIENT
Start: 2021-08-01 | End: 2021-08-01

## 2021-08-01 RX ORDER — INSULIN LISPRO 100 [IU]/ML
INJECTION, SOLUTION INTRAVENOUS; SUBCUTANEOUS
Status: DISCONTINUED | OUTPATIENT
Start: 2021-08-03 | End: 2021-08-06 | Stop reason: HOSPADM

## 2021-08-01 RX ORDER — HEPARIN SODIUM 1000 [USP'U]/ML
INJECTION, SOLUTION INTRAVENOUS; SUBCUTANEOUS AS NEEDED
Status: DISCONTINUED | OUTPATIENT
Start: 2021-08-01 | End: 2021-08-01 | Stop reason: HOSPADM

## 2021-08-01 RX ORDER — INSULIN LISPRO 100 [IU]/ML
INJECTION, SOLUTION INTRAVENOUS; SUBCUTANEOUS
Status: DISCONTINUED | OUTPATIENT
Start: 2021-08-02 | End: 2021-08-03

## 2021-08-01 RX ORDER — OXYCODONE HYDROCHLORIDE 5 MG/1
5 TABLET ORAL
Status: DISCONTINUED | OUTPATIENT
Start: 2021-08-01 | End: 2021-08-06 | Stop reason: HOSPADM

## 2021-08-01 RX ORDER — SODIUM CHLORIDE 9 MG/ML
9 INJECTION, SOLUTION INTRAVENOUS CONTINUOUS
Status: DISCONTINUED | OUTPATIENT
Start: 2021-08-01 | End: 2021-08-05

## 2021-08-01 RX ORDER — NITROGLYCERIN 20 MG/100ML
0-200 INJECTION INTRAVENOUS
Status: DISCONTINUED | OUTPATIENT
Start: 2021-08-01 | End: 2021-08-01

## 2021-08-01 RX ORDER — HEPARIN SODIUM 1000 [USP'U]/ML
2000 INJECTION, SOLUTION INTRAVENOUS; SUBCUTANEOUS ONCE
Status: COMPLETED | OUTPATIENT
Start: 2021-08-01 | End: 2021-08-01

## 2021-08-01 RX ORDER — AMIODARONE HYDROCHLORIDE 200 MG/1
400 TABLET ORAL EVERY 12 HOURS
Status: DISCONTINUED | OUTPATIENT
Start: 2021-08-02 | End: 2021-08-06 | Stop reason: HOSPADM

## 2021-08-01 RX ORDER — ALBUMIN HUMAN 50 G/1000ML
12.5 SOLUTION INTRAVENOUS
Status: COMPLETED | OUTPATIENT
Start: 2021-08-01 | End: 2021-08-01

## 2021-08-01 RX ORDER — PROPOFOL 10 MG/ML
INJECTION, EMULSION INTRAVENOUS AS NEEDED
Status: DISCONTINUED | OUTPATIENT
Start: 2021-08-01 | End: 2021-08-01 | Stop reason: HOSPADM

## 2021-08-01 RX ORDER — HEPARIN SODIUM 5000 [USP'U]/ML
4000 INJECTION, SOLUTION INTRAVENOUS; SUBCUTANEOUS ONCE
Status: COMPLETED | OUTPATIENT
Start: 2021-08-01 | End: 2021-08-01

## 2021-08-01 RX ORDER — FAMOTIDINE 20 MG/1
20 TABLET, FILM COATED ORAL EVERY 24 HOURS
Status: DISCONTINUED | OUTPATIENT
Start: 2021-08-02 | End: 2021-08-03

## 2021-08-01 RX ORDER — SODIUM CHLORIDE 450 MG/100ML
10 INJECTION, SOLUTION INTRAVENOUS CONTINUOUS
Status: DISCONTINUED | OUTPATIENT
Start: 2021-08-01 | End: 2021-08-05

## 2021-08-01 RX ORDER — HEPARIN SODIUM 10000 [USP'U]/100ML
12 INJECTION, SOLUTION INTRAVENOUS CONTINUOUS
Status: DISCONTINUED | OUTPATIENT
Start: 2021-08-01 | End: 2021-08-01

## 2021-08-01 RX ORDER — SODIUM CHLORIDE 0.9 % (FLUSH) 0.9 %
5-40 SYRINGE (ML) INJECTION EVERY 8 HOURS
Status: DISCONTINUED | OUTPATIENT
Start: 2021-08-01 | End: 2021-08-01

## 2021-08-01 RX ORDER — MAGNESIUM SULFATE HEPTAHYDRATE 40 MG/ML
2 INJECTION, SOLUTION INTRAVENOUS ONCE
Status: COMPLETED | OUTPATIENT
Start: 2021-08-01 | End: 2021-08-01

## 2021-08-01 RX ORDER — CHLORHEXIDINE GLUCONATE 1.2 MG/ML
10 RINSE ORAL EVERY 12 HOURS
Status: DISCONTINUED | OUTPATIENT
Start: 2021-08-01 | End: 2021-08-06 | Stop reason: HOSPADM

## 2021-08-01 RX ORDER — NITROGLYCERIN 20 MG/100ML
0-20 INJECTION INTRAVENOUS
Status: DISCONTINUED | OUTPATIENT
Start: 2021-08-01 | End: 2021-08-01

## 2021-08-01 RX ORDER — INSULIN GLARGINE 100 [IU]/ML
1-50 INJECTION, SOLUTION SUBCUTANEOUS
Status: ACTIVE | OUTPATIENT
Start: 2021-08-01 | End: 2021-08-02

## 2021-08-01 RX ORDER — MUPIROCIN 20 MG/G
OINTMENT TOPICAL 2 TIMES DAILY
Status: COMPLETED | OUTPATIENT
Start: 2021-08-01 | End: 2021-08-06

## 2021-08-01 RX ORDER — NOREPINEPHRINE BITARTRATE/D5W 8 MG/250ML
2-16 PLASTIC BAG, INJECTION (ML) INTRAVENOUS
Status: DISCONTINUED | OUTPATIENT
Start: 2021-08-01 | End: 2021-08-01

## 2021-08-01 RX ORDER — POLYETHYLENE GLYCOL 3350 17 G/17G
17 POWDER, FOR SOLUTION ORAL DAILY
Status: DISCONTINUED | OUTPATIENT
Start: 2021-08-02 | End: 2021-08-06 | Stop reason: HOSPADM

## 2021-08-01 RX ORDER — GUAIFENESIN 100 MG/5ML
LIQUID (ML) ORAL
Status: DISCONTINUED
Start: 2021-08-01 | End: 2021-08-01

## 2021-08-01 RX ORDER — NALOXONE HYDROCHLORIDE 0.4 MG/ML
0.4 INJECTION, SOLUTION INTRAMUSCULAR; INTRAVENOUS; SUBCUTANEOUS AS NEEDED
Status: DISCONTINUED | OUTPATIENT
Start: 2021-08-01 | End: 2021-08-06 | Stop reason: HOSPADM

## 2021-08-01 RX ORDER — SODIUM CHLORIDE 0.9 % (FLUSH) 0.9 %
5-40 SYRINGE (ML) INJECTION EVERY 8 HOURS
Status: DISCONTINUED | OUTPATIENT
Start: 2021-08-01 | End: 2021-08-06 | Stop reason: HOSPADM

## 2021-08-01 RX ORDER — SODIUM CHLORIDE 0.9 % (FLUSH) 0.9 %
5-40 SYRINGE (ML) INJECTION AS NEEDED
Status: DISCONTINUED | OUTPATIENT
Start: 2021-08-01 | End: 2021-08-01

## 2021-08-01 RX ADMIN — ALBUMIN (HUMAN) 12.5 G: 12.5 INJECTION, SOLUTION INTRAVENOUS at 16:57

## 2021-08-01 RX ADMIN — NITROGLYCERIN 0.4 MG: 0.4 TABLET, ORALLY DISINTEGRATING SUBLINGUAL at 10:02

## 2021-08-01 RX ADMIN — SUFENTANIL CITRATE 10 MCG: 50 INJECTION EPIDURAL; INTRAVENOUS at 12:32

## 2021-08-01 RX ADMIN — ALBUMIN (HUMAN) 12.5 G: 12.5 INJECTION, SOLUTION INTRAVENOUS at 20:30

## 2021-08-01 RX ADMIN — SODIUM CHLORIDE: 9 INJECTION, SOLUTION INTRAVENOUS at 12:20

## 2021-08-01 RX ADMIN — DEXMEDETOMIDINE HYDROCHLORIDE 0.2 MCG/KG/HR: 100 INJECTION, SOLUTION, CONCENTRATE INTRAVENOUS at 14:16

## 2021-08-01 RX ADMIN — CHLORHEXIDINE GLUCONATE 0.12% ORAL RINSE 10 ML: 1.2 LIQUID ORAL at 21:15

## 2021-08-01 RX ADMIN — NITROGLYCERIN 10 MCG/MIN: 20 INJECTION INTRAVENOUS at 10:20

## 2021-08-01 RX ADMIN — PROPOFOL 30 MCG/KG/MIN: 10 INJECTION, EMULSION INTRAVENOUS at 22:09

## 2021-08-01 RX ADMIN — AMINOCAPROIC ACID 10 G/HR: 250 INJECTION, SOLUTION INTRAVENOUS at 12:55

## 2021-08-01 RX ADMIN — PROTAMINE SULFATE 275 MG: 10 INJECTION, SOLUTION INTRAVENOUS at 14:52

## 2021-08-01 RX ADMIN — MORPHINE SULFATE 4 MG: 4 INJECTION INTRAVENOUS at 16:56

## 2021-08-01 RX ADMIN — SODIUM BICARBONATE 50 MEQ: 84 INJECTION, SOLUTION INTRAVENOUS at 16:52

## 2021-08-01 RX ADMIN — ASPIRIN 324 MG: 81 TABLET, CHEWABLE ORAL at 10:06

## 2021-08-01 RX ADMIN — PROPOFOL 30 MCG/KG/MIN: 10 INJECTION, EMULSION INTRAVENOUS at 16:40

## 2021-08-01 RX ADMIN — ROCURONIUM BROMIDE 55 MG: 10 INJECTION INTRAVENOUS at 12:37

## 2021-08-01 RX ADMIN — HEPARIN SODIUM 4 UNITS: 5000 INJECTION INTRAVENOUS; SUBCUTANEOUS at 10:05

## 2021-08-01 RX ADMIN — CALCIUM CHLORIDE 1 G: 100 INJECTION, SOLUTION INTRAVENOUS at 17:30

## 2021-08-01 RX ADMIN — ACETAMINOPHEN 1000 MG: 500 TABLET ORAL at 23:25

## 2021-08-01 RX ADMIN — CEFAZOLIN 2 G: 1 INJECTION, POWDER, FOR SOLUTION INTRAMUSCULAR; INTRAVENOUS at 22:09

## 2021-08-01 RX ADMIN — FAMOTIDINE 20 MG: 10 INJECTION, SOLUTION INTRAVENOUS at 18:29

## 2021-08-01 RX ADMIN — HEPARIN SODIUM 1000 UNITS: 1000 INJECTION, SOLUTION INTRAVENOUS; SUBCUTANEOUS at 13:04

## 2021-08-01 RX ADMIN — MORPHINE SULFATE 4 MG: 4 INJECTION INTRAVENOUS at 21:18

## 2021-08-01 RX ADMIN — SUFENTANIL CITRATE 0.3 MCG/KG/HR: 50 INJECTION EPIDURAL; INTRAVENOUS at 13:42

## 2021-08-01 RX ADMIN — CEFAZOLIN 2 G: 1 INJECTION, POWDER, FOR SOLUTION INTRAMUSCULAR; INTRAVENOUS; PARENTERAL at 15:30

## 2021-08-01 RX ADMIN — ROCURONIUM BROMIDE 5 MG: 10 INJECTION INTRAVENOUS at 12:32

## 2021-08-01 RX ADMIN — Medication 10 ML: at 17:00

## 2021-08-01 RX ADMIN — ESMOLOL HYDROCHLORIDE 20 MG: 10 INJECTION, SOLUTION INTRAVENOUS at 12:24

## 2021-08-01 RX ADMIN — ACETAMINOPHEN 1000 MG: 500 TABLET ORAL at 18:29

## 2021-08-01 RX ADMIN — ALBUMIN (HUMAN) 12.5 G: 12.5 INJECTION, SOLUTION INTRAVENOUS at 15:53

## 2021-08-01 RX ADMIN — GABAPENTIN 100 MG: 100 CAPSULE ORAL at 22:09

## 2021-08-01 RX ADMIN — PROPOFOL 100 MG: 10 INJECTION, EMULSION INTRAVENOUS at 12:32

## 2021-08-01 RX ADMIN — MAGNESIUM SULFATE IN WATER 2 G: 40 INJECTION, SOLUTION INTRAVENOUS at 14:26

## 2021-08-01 RX ADMIN — FENTANYL CITRATE 50 MCG: 50 INJECTION, SOLUTION INTRAMUSCULAR; INTRAVENOUS at 10:29

## 2021-08-01 RX ADMIN — PHENYLEPHRINE HYDROCHLORIDE 40 MCG/MIN: 10 INJECTION INTRAVENOUS at 14:48

## 2021-08-01 RX ADMIN — Medication 10 ML: at 21:16

## 2021-08-01 RX ADMIN — HEPARIN SODIUM 12 UNITS/KG/HR: 10000 INJECTION, SOLUTION INTRAVENOUS at 12:03

## 2021-08-01 RX ADMIN — CEFAZOLIN 2 G: 1 INJECTION, POWDER, FOR SOLUTION INTRAMUSCULAR; INTRAVENOUS; PARENTERAL at 12:45

## 2021-08-01 RX ADMIN — SUFENTANIL CITRATE 20 MCG: 50 INJECTION EPIDURAL; INTRAVENOUS at 13:14

## 2021-08-01 RX ADMIN — SODIUM CHLORIDE 10 ML/HR: 4.5 INJECTION, SOLUTION INTRAVENOUS at 16:00

## 2021-08-01 RX ADMIN — SUCCINYLCHOLINE CHLORIDE 140 MG: 20 INJECTION, SOLUTION INTRAMUSCULAR; INTRAVENOUS at 12:32

## 2021-08-01 RX ADMIN — ALBUMIN (HUMAN) 12.5 G: 12.5 INJECTION, SOLUTION INTRAVENOUS at 23:25

## 2021-08-01 RX ADMIN — GABAPENTIN 100 MG: 100 CAPSULE ORAL at 18:29

## 2021-08-01 RX ADMIN — MUPIROCIN: 20 OINTMENT TOPICAL at 21:15

## 2021-08-01 RX ADMIN — HEPARIN SODIUM 20000 UNITS: 1000 INJECTION, SOLUTION INTRAVENOUS; SUBCUTANEOUS at 13:40

## 2021-08-01 RX ADMIN — LIDOCAINE HYDROCHLORIDE 60 MG: 20 INJECTION, SOLUTION EPIDURAL; INFILTRATION; INTRACAUDAL; PERINEURAL at 12:32

## 2021-08-01 RX ADMIN — SUFENTANIL CITRATE 30 MCG: 50 INJECTION EPIDURAL; INTRAVENOUS at 13:08

## 2021-08-01 RX ADMIN — PHENYLEPHRINE HYDROCHLORIDE 30 MCG/MIN: 10 INJECTION INTRAVENOUS at 16:00

## 2021-08-01 NOTE — ED PROVIDER NOTES
EMERGENCY DEPARTMENT HISTORY AND PHYSICAL EXAM      Date: 8/1/2021  Patient Name: Jose Frankel    Please note that this dictation was completed with [employer], the computer voice recognition software. Quite often unanticipated grammatical, syntax, homophones, and other interpretive errors are inadvertently transcribed by the computer software. Please disregard these errors. Please excuse any errors that have escaped final proofreading. History of Presenting Illness     Chief Complaint   Patient presents with    Chest Pain     Mid epigastric pain from mid chest radiating up to jaw since yesterday. pt reports she believes this is GERD, however symptoms are not resolving. Pt denies any cardiac history. History Provided By: Patient     HPI: Jose Frankel, 63 y.o. female, with a past medical history for remote history of tobacco abuse, hypothyroid, GERD presenting with chest pain. Started yesterday, radiates up to her jaw. Associated with shortness of breath, worse with exertion. No fevers chills or cough. No unilateral leg pain or leg swelling. No prior history of DVT or PE. Rates her pain as 8 out of 10    PCP: Jose Bella MD    No current facility-administered medications on file prior to encounter. Current Outpatient Medications on File Prior to Encounter   Medication Sig Dispense Refill    levothyroxine (SYNTHROID) 100 mcg tablet Take 100 mcg by mouth Daily (before breakfast).  green tea leaf extract (GREEN TEA PO) Take  by mouth Every morning.  OTHER Take  by mouth every evening. CHAMOMILE TEA         Past History     Past Medical History:  History reviewed. No pertinent past medical history. Past Surgical History:  History reviewed. No pertinent surgical history. Family History:  History reviewed. No pertinent family history.     Social History:  Social History     Tobacco Use    Smoking status: Never Smoker    Smokeless tobacco: Never Used   Substance Use Topics    Alcohol use: Not Currently    Drug use: Never       Allergies:  No Known Allergies      Review of Systems   Review of Systems   Constitutional: Negative for chills and fever. HENT: Negative for congestion and sore throat. Eyes: Negative for visual disturbance. Respiratory: Positive for shortness of breath. Negative for cough. Cardiovascular: Positive for chest pain. Negative for leg swelling. Gastrointestinal: Negative for abdominal pain, blood in stool, diarrhea and nausea. Endocrine: Negative for polyuria. Genitourinary: Negative for dysuria, flank pain, vaginal bleeding and vaginal discharge. Musculoskeletal: Negative for myalgias. Skin: Negative for rash. Allergic/Immunologic: Negative for immunocompromised state. Neurological: Negative for weakness and headaches. Psychiatric/Behavioral: Negative for confusion. Physical Exam   Physical Exam  Vitals and nursing note reviewed. Constitutional:       General: She is in acute distress. Appearance: She is well-developed. She is ill-appearing. HENT:      Head: Normocephalic and atraumatic. Eyes:      General:         Right eye: No discharge. Left eye: No discharge. Conjunctiva/sclera: Conjunctivae normal.      Pupils: Pupils are equal, round, and reactive to light. Neck:      Trachea: No tracheal deviation. Cardiovascular:      Rate and Rhythm: Normal rate and regular rhythm. Heart sounds: Normal heart sounds. No murmur heard. Pulmonary:      Effort: Pulmonary effort is normal. No respiratory distress. Breath sounds: Normal breath sounds. No wheezing or rales. Abdominal:      General: Bowel sounds are normal.      Palpations: Abdomen is soft. Tenderness: There is no abdominal tenderness. There is no guarding or rebound. Musculoskeletal:         General: No tenderness or deformity. Normal range of motion. Cervical back: Normal range of motion and neck supple.    Skin: General: Skin is warm and dry. Findings: No erythema or rash. Neurological:      Mental Status: She is alert and oriented to person, place, and time. Psychiatric:         Behavior: Behavior normal.         Diagnostic Study Results     Labs -     Recent Results (from the past 12 hour(s))   EKG, 12 LEAD, INITIAL    Collection Time: 08/01/21  9:47 AM   Result Value Ref Range    Ventricular Rate 73 BPM    Atrial Rate 73 BPM    P-R Interval 150 ms    QRS Duration 86 ms    Q-T Interval 384 ms    QTC Calculation (Bezet) 423 ms    Calculated P Axis 23 degrees    Calculated R Axis 48 degrees    Calculated T Axis -112 degrees    Diagnosis       Normal sinus rhythm  Marked ST abnormality, possible inferolateral subendocardial injury  No previous ECGs available     CBC WITH AUTOMATED DIFF    Collection Time: 08/01/21  9:53 AM   Result Value Ref Range    WBC 11.4 (H) 3.6 - 11.0 K/uL    RBC 4.36 3.80 - 5.20 M/uL    HGB 13.5 11.5 - 16.0 g/dL    HCT 41.3 35.0 - 47.0 %    MCV 94.7 80.0 - 99.0 FL    MCH 31.0 26.0 - 34.0 PG    MCHC 32.7 30.0 - 36.5 g/dL    RDW 12.4 11.5 - 14.5 %    PLATELET 525 370 - 494 K/uL    MPV 10.9 8.9 - 12.9 FL    NRBC 0.0 0  WBC    ABSOLUTE NRBC 0.00 0.00 - 0.01 K/uL    NEUTROPHILS 78 (H) 32 - 75 %    LYMPHOCYTES 11 (L) 12 - 49 %    MONOCYTES 9 5 - 13 %    EOSINOPHILS 1 0 - 7 %    BASOPHILS 1 0 - 1 %    IMMATURE GRANULOCYTES 0 0.0 - 0.5 %    ABS. NEUTROPHILS 9.0 (H) 1.8 - 8.0 K/UL    ABS. LYMPHOCYTES 1.2 0.8 - 3.5 K/UL    ABS. MONOCYTES 1.0 0.0 - 1.0 K/UL    ABS. EOSINOPHILS 0.1 0.0 - 0.4 K/UL    ABS. BASOPHILS 0.1 0.0 - 0.1 K/UL    ABS. IMM.  GRANS. 0.0 0.00 - 0.04 K/UL    DF AUTOMATED     METABOLIC PANEL, COMPREHENSIVE    Collection Time: 08/01/21  9:53 AM   Result Value Ref Range    Sodium 139 136 - 145 mmol/L    Potassium 4.1 3.5 - 5.1 mmol/L    Chloride 106 97 - 108 mmol/L    CO2 25 21 - 32 mmol/L    Anion gap 8 5 - 15 mmol/L    Glucose 112 (H) 65 - 100 mg/dL    BUN 15 6 - 20 MG/DL Creatinine 0.91 0.55 - 1.02 MG/DL    BUN/Creatinine ratio 16 12 - 20      GFR est AA >60 >60 ml/min/1.73m2    GFR est non-AA >60 >60 ml/min/1.73m2    Calcium 9.9 8.5 - 10.1 MG/DL    Bilirubin, total 0.2 0.2 - 1.0 MG/DL    ALT (SGPT) 28 12 - 78 U/L    AST (SGOT) 25 15 - 37 U/L    Alk. phosphatase 87 45 - 117 U/L    Protein, total 8.0 6.4 - 8.2 g/dL    Albumin 4.3 3.5 - 5.0 g/dL    Globulin 3.7 2.0 - 4.0 g/dL    A-G Ratio 1.2 1.1 - 2.2     TROPONIN I    Collection Time: 08/01/21  9:53 AM   Result Value Ref Range    Troponin-I, Qt. 0.68 (H) <0.05 ng/mL   POC TROPONIN-I    Collection Time: 08/01/21 10:00 AM   Result Value Ref Range    Troponin-I (POC) 0.43 (H) 0.00 - 0.08 ng/mL   PTT    Collection Time: 08/01/21 10:35 AM   Result Value Ref Range    aPTT >130.0 (HH) 22.1 - 31.0 sec    aPTT, therapeutic range     58.0 - 77.0 SECS   TYPE & SCREEN    Collection Time: 08/01/21 11:30 AM   Result Value Ref Range    Crossmatch Expiration 08/04/2021,2359     ABO/Rh(D) A POSITIVE     Antibody screen NEG     Unit number [id number]     Blood component type  LR     Unit division 00     Status of unit ISSUED     Crossmatch result Compatible     Unit number [id number]     Blood component type  LR     Unit division 00     Status of unit ISSUED     Crossmatch result Compatible    RBC, ALLOCATE    Collection Time: 08/01/21 12:30 PM   Result Value Ref Range    HISTORY CHECKED? Historical check performed        Radiologic Studies -   XR CHEST PORT   Final Result      No acute process. XR CHEST PORT    (Results Pending)   XR CHEST PORT    (Results Pending)   XR CHEST PORT    (Results Pending)   XR CHEST PORT    (Results Pending)     CT Results  (Last 48 hours)    None        CXR Results  (Last 48 hours)               08/01/21 0958  XR CHEST PORT Final result    Impression:      No acute process. Narrative:  EXAM:  XR CHEST PORT       INDICATION:  chest pain       COMPARISON:  None.        FINDINGS:       A portable AP radiograph of the chest was obtained at 9:45 hours. The lungs are   clear. The cardiac and mediastinal contours and pulmonary vascularity are   normal.  The bones and soft tissues are unremarkable. Medical Decision Making   I am the first provider for this patient. I reviewed the vital signs, available nursing notes, past medical history, past surgical history, family history and social history. Vital Signs-Reviewed the patient's vital signs. Patient Vitals for the past 12 hrs:   Temp Pulse Resp BP SpO2   08/01/21 1549     100 %   08/01/21 1040  68 21 117/61 99 %   08/01/21 1035  70 19 (!) 119/59 100 %   08/01/21 1030  69 22 123/61 100 %   08/01/21 1025  66 20 (!) 120/58 100 %   08/01/21 1020  72 19 119/64 100 %   08/01/21 1016  67 18 (!) 112/59 100 %   08/01/21 1015  70 24 (!) 101/53 99 %   08/01/21 1003  78 15 (!) 145/70 99 %   08/01/21 1002     99 %   08/01/21 0952 98.2 °F (36.8 °C) 74 16 (!) 142/68 97 %       EKG interpretation: (Preliminary)  EKG shows sinus rhythm, rate 73. Diffuse ST depressions with ST elevation in aVR concerning for STEMI equivalent, possible left main disease. Interpreted by me    Records Reviewed:   Nursing notes, Prior visits     Provider Notes (Medical Decision Making):   81-year-old female appears to be having a STEMI equivalent on EKG, symptoms are consistent with ACS. Still having pain at this time, hemodynamically stable. We will go ahead and call this a STEMI and get patient emergently to the Cath Lab. Will discuss with cardiology. ED Course:   Initial assessment performed. The patients presenting problems have been discussed, and they are in agreement with the care plan formulated and outlined with them. I have encouraged them to ask questions as they arise throughout their visit.     ED Course as of Aug 01 1550   Ekta Del Castillo Aug 01, 2021   1035 Still waiting on Cath Lab    [AR]      ED Course User Index  [AR] Truett Betters, DO 1000am  Discussed with Dr. Federico Gonsalves, he will come see patient, recommends heparin. We will plan to take patient to Cath Lab        Critical Care Time:   I have spent 45 minutes of critical care time in evaluating and treating this patient. This includes time spent at bedside, time with family and decision makers, documentation, review of labs and imaging, and/or consultation with specialists. It does not include time spent on separately billed procedures. This patient presents with a critical illness or injury that acutely impairs one or more vital organ systems such that there is a high probability of imminent or life threatening deterioration in the patient's condition. This case involved decision making of high complexity to assess, manipulate, and support vital organ system failure and/or to prevent further life threatening deterioration of the patient's condition. Failure to initiate these interventions on an urgent basis would likely result in sudden, clinically significant or life threatening deterioration in the patient's condition. Abnormal findings supporting critical care: Ischemic EKG, elevated troponin. Interventions to support critical care: Aspirin, nitro, specialist consultation, EKG interpretation, anticoagulation  Failure to intervene may result in: Arrhythmia, heart failure, death  Disposition:  Patient is being admitted to the hospital by Dr. Federico Gonsalves. The results of their tests and reasons for their admission have been discussed with them and/or available family. They convey agreement and understanding for the need to be admitted and for their admission diagnosis. Consultation has been made with the inpatient physician specialist for hospitalization. PLAN:  1. Current Discharge Medication List        2. Follow-up Information    None         Return to ED if worse     Diagnosis     Clinical Impression:   1.  ST elevation myocardial infarction (STEMI), unspecified artery ([facility].)    2. S/P CABG x 2        Attestations:   This note was completed by Rema Lozada DO

## 2021-08-01 NOTE — ANESTHESIA POSTPROCEDURE EVALUATION
Procedure(s):  LIEN AND EPIAORTIC ULTRASOUND BY DR Gio Gao - ON PUMP CORONARY ARTERY BYPASS GRAFT X 2 WITH LEFT INTERNAL MAMMARY ARTERY AND GREATER LEFT SAPHENOUS VEIN GRAFTING AND EVH. general    Anesthesia Post Evaluation      Multimodal analgesia: multimodal analgesia used between 6 hours prior to anesthesia start to PACU discharge  Patient location during evaluation: ICU  Note status: Sedated. Post-procedure mental status: Sedated. Pain management: adequate  Airway patency: patent  Anesthetic complications: no  Cardiovascular status: acceptable  Respiratory status: acceptable  Hydration status: acceptable  Comments: Planned ICU ventilation post CABG  Post anesthesia nausea and vomiting:  none  Final Post Anesthesia Temperature Assessment:  Normothermia (36.0-37.5 degrees C)      INITIAL Post-op Vital signs:   Vitals Value Taken Time   BP 92/56 08/01/21 1553   Temp 36 °C (96.8 °F) 08/01/21 1553   Pulse 90 08/01/21 1608   Resp 12 08/01/21 1608   SpO2 100 % 08/01/21 1608   Vitals shown include unvalidated device data.

## 2021-08-01 NOTE — CONSULTS
[facility]    Name:  Scott Macedo  MR#:  [id number]  :  1955  ACCOUNT #:  [de-identified]  DATE OF SERVICE:  2021      HISTORY OF PRESENT ILLNESS:  I was called emergently to the cardiac cath lab by Dr. Thea Mclean to evaluate this 60-year-old woman who was admitted with unstable angina. The patient gives a history of having persistent chest discomfort throughout the last evening associated with some numbness and jaw pain. She did not sleep well and the pain persisted, so she presented to the emergency room and had persisting chest discomfort following heparin and nitroglycerin. Her troponins were slightly elevated and she was taken emergently to the cath lab. She denies any associated shortness of breath, any history of chronic cough or fever. She has no prior history of coronary artery disease. PAST MEDICAL HISTORY:  Significant for hypothyroidism and gastroesophageal reflux. PAST SURGICAL HISTORY:  Unremarkable. MEDICATIONS:  Her only medication on admission was levothyroxine. SOCIAL HISTORY:  She drinks occasionally and is a nonsmoker. FAMILY HISTORY:  Positive for coronary artery disease. REVIEW OF SYSTEMS:  Negative, except that noted above in the history of present illness. PHYSICAL EXAMINATION:  GENERAL:  She is alert and comfortable on the cath lab table, following an intra-aortic balloon pump insertion. NECK:  There are no cervical bruits. LUNGS:  Her lung fields are clear anteriorly. ABDOMEN:  Soft. EXTREMITIES:  Her peripheral pulses in the left groin are intact. The right groin has an intra-aortic balloon pump. There is no peripheral edema. Review of her cardiac catheterization shows severe multivessel coronary artery disease, including a high-grade left main, left anterior and circumflex. The right coronary artery is chronically occluded and small. IMPRESSION:  1. Unstable angina.   2.  Elevated troponin consistent with non-ST elevation myocardial infarction. 3.  Hypothyroidism. 4.  History of gastroesophageal reflux. PLAN:  The patient needs emergent coronary artery bypass grafting. I discussed this with her as well as her . I explained the risks, indications, and alternatives.         MD ALIDA [id number]  D:  08/01/2021 12:45  T:  08/01/2021 14:03  JOB #:  [id number]

## 2021-08-01 NOTE — PERIOP NOTES
TRANSFER - OUT REPORT:    Verbal report given to PRAFUL Quezada RN  on Vick Sahni  being transferred to CCU for routine progression of care       Report consisted of patients Situation, Background, Assessment and   Recommendations(SBAR). Information from the following report(s) SBAR, OR Summary and Procedure Summary was reviewed with the receiving nurse. Lines:   Vester Crape Triple 08/01/21 Right Neck (Active)       Peripheral IV 08/01/21 Right Antecubital (Active)   Site Assessment Clean, dry, & intact 08/01/21 0959   Phlebitis Assessment 0 08/01/21 0959   Infiltration Assessment 0 08/01/21 0959   Dressing Status Clean, dry, & intact 08/01/21 0959   Action Taken Blood drawn 08/01/21 0959       Peripheral IV 08/01/21 Left Antecubital (Active)   Site Assessment Clean, dry, & intact 08/01/21 0959   Phlebitis Assessment 0 08/01/21 0959   Infiltration Assessment 0 08/01/21 0959   Dressing Status Clean, dry, & intact 08/01/21 0959   Hub Color/Line Status Pink 08/01/21 0959   Action Taken Blood drawn 08/01/21 1951        Opportunity for questions and clarification was provided.       Patient transported with:   Monitor  O2 @ 10 liters via ET tube and AMBU Bag ventilations accompanied by CRNA, Perfusionist, EROS and RN Circulator

## 2021-08-01 NOTE — ED NOTES
Cath lab at bedside to transport Pt. Report given to Marco Smith RN: report covered ED Summary, SBAR, [facility], Recent Results.

## 2021-08-01 NOTE — H&P
CSS   History and Physical    Subjective:      Kadi Landis is a 63 y.o. female who was referred for cardiac evaluation by Dr Federico Gonsalves. 64 y/o female with h/o hypothyroidism & GERD developed chest pain and belching today. Seen in ED and found to have inferolateral ST depressions. Troponin elevated at 0.68. Cxr clear. Dr Federico Gonsalves consulted and pt taken to the Cath Lab emergently and found to have significant 3 VD with reduced EF (40-45%) and inferior wall hypokinesis. No vessels amenable to PCI. Chest pain persisted during cath, so IABP placed with relief of symptoms. Dr Em Iyer consulted for emergent CABG. Pt seen on cath lab table for brief history. No current chest pain or SOB. Denies previous MI, stroke, HTN, diabetes, smoking. Occasional Etoh. Does have h/o hypothyroidism, HLD (no meds) and h/o basal cell Ca excised from nose x 3 (no radiation). Cardiac Testing    Cardiac catheterization: severe 3 VD, EF reduced ~ 40-45% with inferior wall hypokinesis    ECHO:n/a    History reviewed. No pertinent past medical history. History reviewed. No pertinent surgical history. Social History     Tobacco Use    Smoking status: Never Smoker    Smokeless tobacco: Never Used   Substance Use Topics    Alcohol use: Not Currently      History reviewed. No pertinent family history. Prior to Admission medications    Medication Sig Start Date End Date Taking? Authorizing Provider   levothyroxine (SYNTHROID) 100 mcg tablet Take 100 mcg by mouth Daily (before breakfast). Yes Provider, Historical   green tea leaf extract (GREEN TEA PO) Take  by mouth Every morning. Yes Provider, Historical   OTHER Take  by mouth every evening. CHAMOMILE TEA   Yes Provider, Historical       No Known Allergies    Review of Systems: Limited due to medical urgency    CV: + chest pain  Resp: No SOB  GI: + GERD  Skin: + h/o basal cell Ca nose, excised x 3  Neuro: Denies strokes, or TIAs.   Endocrine:+ hypothyroidism    Objective:     VS:     Physical Exam:    General appearance: awake & alert. No distress  Eyes: Sclera clear  Neck: no JVD  Lungs: clear to auscultation bilaterally  Heart: regular rate and rhythm; no murmur  Abdomen: soft, non-tender; bowel sounds normal, no organomegaly  Extremities: IABP right femoral artery with no bleeding or hematoma. Left femoral pulse 2+. No DP or PT pulses palpable in either LE  Skin: Skin color normal; No varicose veins or edema. Neurologic: Alert & oriented. CN grossly intact. Labs:   Recent Labs     08/01/21  0953   WBC 11.4*   HGB 13.5   HCT 41.3         K 4.1   BUN 15   CREA 0.91   *       Diagnostics:   PA and lateral: No acute process    Carotid doppler:     PFTS-FEV1:     EKG:   Assessment:     Active Problems:    CAD (coronary artery disease) (8/1/2021)        Plan:   The risk and benefit of surgery were reviewed with patient and family and all questions answered and the patient wishes to proceed. Risk include infection, bleeding, stroke, heart attack, irregular heart rhythm, kidney failure and death. STS Risk Calculator V2.81 - Discussed by surgeon with patient. Risk of Mortality:  2.746%  Renal Failure:  1.130%  Permanent Stroke:  1.594%  Prolonged Ventilation:  17.703%  DSW Infection:  0.123%  Reoperation:  2.855%  Morbidity or Mortality:  21.776%  Short Length of Stay:  36.041%  Long Length of Stay:  4.992%          Treatment Plan:    1. CAD, NSTEMI (IMI), reduced EF, IABP pre-op: Emergent CABG  2. HLD: no meds at home. Will need statin post-op  3.  Hypothyroidism: continue home meds      Signed By: Petra Magaña PA-C     August 1, 2021

## 2021-08-01 NOTE — ANESTHESIA PROCEDURE NOTES
Central Line Placement    Performed by: Tucker May DO  Authorized by: Tucker May DO     Indications: vascular access, central pressure monitoring and need for vasopressors  Preanesthetic Checklist: patient identified, risks and benefits discussed, anesthesia consent, site marked, patient being monitored and timeout performed      Pre-procedure: All elements of maximal sterile barrier technique followed?  Yes    2% Chlorhexidine for cutaneous antisepsis, Hand hygiene performed prior to catheter insertion and Ultrasound guidance    Sterile Ultrasound Technique followed?: Yes            Procedure:   Prep:  Chlorhexidine    Orientation:  Right  Patient position:  Trendelenburg  Catheter type:  Double lumen  Catheter size:  9 Fr  Catheter length:  12 cm  Number of attempts:  1  Successful placement: Yes      Assessment:   Post-procedure:  Catheter secured, sterile dressing applied and sterile dressing with CHG applied  Assessment:  Blood return through all ports, free fluid flow and guidewire removal verified  Insertion:  Uncomplicated  Patient tolerance:  Patient tolerated the procedure well with no immediate complications

## 2021-08-01 NOTE — Clinical Note
TRANSFER - OUT REPORT:     Verbal report given to: Anesthesia, (at bedside). Report consisted of patient's Situation, Background, Assessment and   Recommendations(SBAR). Opportunity for questions and clarification was provided.       Patient transported to: OR.

## 2021-08-01 NOTE — PROGRESS NOTES
1900:  Bedside and Verbal shift change report given to  Allyssa TOLBERT (oncoming nurse) by Irais Troncoso RN  (offgoing nurse). Report included the following information SBAR, Kardex, ED Summary, OR Summary, Procedure Summary, Intake/Output, MAR, Accordion, Recent Results, Med Rec Status and Cardiac Rhythm Paced . 2000:  Pt intubated, sedated and restrained for safety. Pt on IABP 1:2. Pt on Michael at 30 mcg, Propofol infusing at 30 mcg, Precede infusing at 0.2 mcg and 0.9 % NS KVO. Assessment completed see complex assessment page. 2030: Pt's MAP > 75 per A- Line and Cuff Pressure Michael rate decreased to 28 mcg. Pt's CVP 5 - 7 Albumin given per PRN order. 2100:  Michael rate decreased to 26 mcg pt tolerating well. Pt waking up coughing, when pt was asked if she was in pain, Pt nodded yes at 2118  Pt given 4 mg of IV morphine per order. 2200:  Pt resting in bed comfortably. 2300:  Spoke with Critical Care NP, regarding CVP being 3-5, despite CI >2.5, Urine output 75 cc-150 cc /hr. CT out put 10-40 cc /hr, PAP 20's/10's. pt on Michael at 26 mcg, MAP >65. Pt with underlying rhythm, SR in the 70's with 1st Degree AV block. Order received to give 12.5g of Albumin. 0000:  Reassessment completed no change noted. Dumont care completed. 0100: Albumin given CVP 7-8. Will continue to monitor. 1014:  Pt awake coughing, getting restless, mouth wording around the ET tube pain, pt given 4 mg of IV morphine per PRN order. Michael rate increased to keep MAP of 65.    0400:  Reassessment completed no change noted. Pt turned and repositioned. Mouth and dumont care completed. 0430:  Pt awake and following simple commands. 0600 :  Pt currently intubated and sedated, propofol infusing at 15 mcg, Dex infusing at 0.5 mcg, Michael infusing at 40 mcg, pt remains on IABP 1:2. CO: 4.4  CI 2.7 PAP 30's/10's (20's) CVP 10 -12. Total CT output 290 ml/12 hrs. Total urine output 1050 ml/ 12 hrs.   0715:   Bedside and Verbal shift change report given to Dulce Rosa (oncoming nurse) by Mervin Reyes RN  (offgoing nurse). Report included the following information SBAR, Kardex, ED Summary, OR Summary, Procedure Summary, Intake/Output, MAR, Accordion, Recent Results, Med Rec Status, Cardiac Rhythm  A- Paced, Alarm Parameters , Pre Procedure Checklist, Procedure Verification, Quality Measures and Dual Neuro Assessment.

## 2021-08-01 NOTE — Clinical Note
Cardiac surgeon notified, OR team notified, Pt to go down for emergency bypass surgery. Balloon pump to be inserted prior to going to the OR.

## 2021-08-01 NOTE — PROGRESS NOTES
1553 Patient arrived from OR. 1 5 % albumin for hypotension  1656 Patient woke up agitated, vagal response BP dropped  To 60s. 4 mg morphine given. 1657 5 % albumin for hypotension  36147 gm Calcium chloride for iCal of 1.0  1630 Hgb came back at 8.6 at 1600 down from 13.5 at 0953. Labs resent for verification. 1645 Hgb back at 8.4 addressed issue with Dr Kwaku Gimenez, patient does not appear like she had such a significant drop in hgb. On 30 mcgs jasson, 30 mcgs propofol. No visible sign of such a significant bleed.   1900 Report to TOMASZ TIAN

## 2021-08-01 NOTE — ANESTHESIA PROCEDURE NOTES
Arterial Line Placement    Performed by: Mary Daily MD  Authorized by: Chloé Mcgrath DO     Pre-Procedure  Indications:  Arterial pressure monitoring and blood sampling  Preanesthetic Checklist: patient identified, risks and benefits discussed, anesthesia consent, site marked, patient being monitored, timeout performed and patient being monitored      Procedure:   Prep:  ChloraPrep  Seldinger Technique?: Yes    Orientation:  Right  Location:  Radial artery  Catheter size:  20 G  Number of attempts:  1    Assessment:   Post-procedure:  Line secured and sterile dressing applied  Patient Tolerance:  Patient tolerated the procedure well with no immediate complications

## 2021-08-01 NOTE — Clinical Note
IABP size = 40 cc. IABP ratio = 1:1. IABP trigger = AutoPilot. IABP augmented pressure = 123. IABP unassisted systolic pressure = 81. IABP unassisted diastolic pressure = 49.

## 2021-08-01 NOTE — H&P
[address]  HISTORY AND PHYSICAL    Name:  Scott Macedo  MR#:  [id number]  :  1955  ACCOUNT #:  [de-identified]  ADMIT DATE:  2021      CHIEF COMPLAINT:  Chest pain and jaw pain. HISTORY OF PRESENT ILLNESS:  The patient is a 73-year-old female without prior cardiac history, but with a history of dyslipidemia and gastroesophageal reflux. She has been bothered for some time by gastroesophageal reflux and typically has midsternal chest discomfort. She did see Dr. Raquel Greene and unfortunately, she has been intolerant of PPIs in the past.  Yesterday, she developed midsternal discomfort which persisted throughout the day and into the evening. She had trouble sleeping last night. She did have some numbness in her left arm and jaw pain as well. The patient decided to come to the ER this morning and her initial EKG showed ST depression inferiorly and laterally. Initial POC troponin was 0.43 and I was asked to see the patient for evaluation. The patient has received nitroglycerin and aspirin. She had some relief of her discomfort with nitroglycerin, but is still having some discomfort. She was given IV heparin in the emergency room. She is currently hemodynamically stable. She denies hypertension or diabetes and is a nonsmoker. PAST MEDICAL HISTORY:  Gastroesophageal reflux, hypothyroidism. PAST SURGICAL HISTORY:  Status post hand surgery, status post tonsillectomy, status post D and C.    MEDICATIONS PRIOR TO ADMISSION:  L-thyroxine. SOCIAL HISTORY:  The patient does not smoke and she only occasionally drinks alcohol. FAMILY HISTORY:  Her father had heart disease. REVIEW OF SYSTEMS:  As noted above. No melena and hematochezia. PHYSICAL EXAMINATION:  GENERAL:  Exam reveals an anxious-appearing late middle-aged white female. VITAL SIGNS:  Blood pressure 142/68, pulse 74, respirations 16, temperature 98. 2. HEENT:  Pupils are equal and reactive.   Oropharynx moist oral mucosa. NECK:  Supple. No masses or thyromegaly. No cervical or supraclavicular adenopathy. No carotid bruits. No JVD. CHEST:  Clear. No wheezes or crackles. SKIN:  Warm and dry. CARDIAC:  Regular rate and rhythm. Normal S1 and S2. No obvious murmurs, rubs, gallops or clicks. ABDOMEN:  Soft and nontender. No masses or organomegaly. Bowel sounds are positive. EXTREMITIES:  No cyanosis, clubbing or edema. Distal pulses 1-2+ in the feet bilaterally. NEURO:  No obvious gross motor deficits. LABORATORY DATA:  EKG normal sinus rhythm, normal axis, lateral and inferior ST depression, rate is 73 beats per minute, hemoglobin 13.5. BUN 15, creatinine 0.9, troponin 0.68. Chest x-ray is negative. IMPRESSION:  1. Acute coronary syndrome with EKG changes as noted above. 2.  History of gastroesophageal reflux. 3.  Dyslipidemia. 4.  Hypothyroidism. RECOMMENDATIONS:  We will start IV nitroglycerin and IV heparin. Continue aspirin and add a beta-locker. We will plan on taking the patient to the cath lab this morning. This was discussed with the patient and her  and they understand and agree with the plan.       Donell Ribera MD      [id number]  D:  08/01/2021 10:56  T:  08/01/2021 12:27  JOB #:  [id number]  CC:  Bebe Gong MD

## 2021-08-01 NOTE — PERIOP NOTES
Called emergently to cath lab for pt with unstable angina and severe multi vessel and L main CAD  Pain better with IABP, but anatomy critical   Discussed plans and risk with pt and    Full consult dictated

## 2021-08-01 NOTE — CONSULTS
[facility]      Name: Vick Sahni   : 1955   MRN: [id number]   Date: 2021      Assessment:   Brief patient summary:  63 F former smoker with hx of hypothyroidism, GERD presented to [facility] ED  with CP radiating to her jaw. EKG revealed Marked ST abnormality, possible inferolateral subendocardial injury. Emergent LHC revealed severe multivessel coronary artery disease, including a high-grade left main, left anterior and circumflex. The right coronary artery is chronically occluded and small. She went for emergent CABG . CCM Service asked to assist in her ICU care    Hospital course/major results:   As above      Active Problems: (By systems)  PRINCIPLE DIAGNOSIS:  1. S/P emergent CABG for critical L main disease      PULMONARY:  2. Post op ventilator status  3. Former smoker without prior diagnosis of COPD    CARDIOVASCULAR/HEMODYNAMIC:  4. CAD  5. S/P 2V CABG   6. Pot op hypotension  7. IABP in place    RENAL:  8. No issues    GI/NUTRITION:  9. H/O GERD    Current nutritional support:     INFECTIOUS DISEASE  10. No issues    Micro:      Antibiotics:      HEME  11. No issues    NEURO  12. Post op pain    RASS goal: 0    OTHER  13. GOALS OF CARE/ADVANCED DIRECTIVES  14. CODE STATUS: Full      ICU Comprehensive Plan of Care:     Plans for this Shift:  1. Ventilator settings/ABG reviewed with RT   2. Wean per CTS protocol  3. CCM Service will be available to oversee timing of extubation  4. ICU hemodynamic monitoring  5. MAP goal > 65  6. SBP goal > 100  7. Post op and hemodynamic mgmt per CTS protocol  8. Monitor chemistry panels daily  9. Correct electrolytes as indicated  10. Nutrition: none  11. SUP: IV PPI  12. Glycemic control: SSI  13. Micro and antibiotics as documented above  14. HPI/Consult/Subjective:   HPI:  As above      24 Hr Events 2021:       SUBJ:   Sedated, intubated post op.  IABP @ 2:1      Past Medical History:      has no past medical history on file. Past Surgical History:      has no past surgical history on file. Home Medications:     Prior to Admission medications    Medication Sig Start Date End Date Taking? Authorizing Provider   levothyroxine (SYNTHROID) 100 mcg tablet Take 100 mcg by mouth Daily (before breakfast). Yes Provider, Historical   green tea leaf extract (GREEN TEA PO) Take  by mouth Every morning. Yes Provider, Historical   OTHER Take  by mouth every evening. CHAMOMILE TEA   Yes Provider, Historical       Allergies/Social/Family History:     No Known Allergies   Social History     Tobacco Use    Smoking status: Never Smoker    Smokeless tobacco: Never Used   Substance Use Topics    Alcohol use: Not Currently      History reviewed. No pertinent family history.         Objective:   Vital Signs:  Visit Vitals  BP (!) 92/56   Pulse 80   Temp 96.8 °F (36 °C)   Resp 12   Ht 5' 1\" (1.549 m)   Wt 63 kg (139 lb)   SpO2 100%   BMI 26.26 kg/m²    O2 Flow Rate (L/min): 2 l/min O2 Device: Endotracheal tube Temp (24hrs), Av.5 °F (36.4 °C), Min:96.8 °F (36 °C), Max:98.2 °F (36.8 °C)           Intake/Output:     Intake/Output Summary (Last 24 hours) at 2021 1603  Last data filed at 2021 1536  Gross per 24 hour   Intake 1500 ml   Output 430 ml   Net 1070 ml       Physical Exam:  GEN: intubated, sedated  HEENT: NCAT, sclerae white, ETT present  NECK:  No JVD  CHEST: No wheezes  CARDIAC: NSR, reg, no M  ABD: soft, diminished BS  EXT: Cool, no edema  NEURO: No ofcal deficits  DERM: No lesions    I have examined the patient on this day 2021 and the above documented exam is accurate including the components that have been copied forward    LABS AND  DATA: Personally reviewed  Recent Labs     21  0953   WBC 11.4*   HGB 13.5   HCT 41.3        Recent Labs     21  0953      K 4.1      CO2 25   BUN 15   CREA 0.91   *   CA 9.9     Recent Labs     21  0953   AP 87   TP 8.0   ALB 4.3 GLOB 3.7     Recent Labs     08/01/21  1035   APTT >130.0*      No results for input(s): PHI, PCO2I, PO2I, FIO2I in the last 72 hours. Recent Labs     08/01/21  0953   TROIQ 0.68*       Hemodynamics:   PAP:   CO:     Wedge:   CI:     CVP:    SVR:       PVR:       Ventilator Settings:  Mode Rate Tidal Volume Pressure FiO2 PEEP   SIMV   396 ml  10 cm H2O 80 % 5 cm H20     Peak airway pressure: 18 cm H2O    Minute ventilation: 4.8 l/min        MEDS: Reviewed    Chest X-Ray:  CXR Results  (Last 48 hours)               08/01/21 0958  XR CHEST PORT Final result    Impression:      No acute process. Narrative:  EXAM:  XR CHEST PORT       INDICATION:  chest pain       COMPARISON:  None. FINDINGS:       A portable AP radiograph of the chest was obtained at 9:45 hours. The lungs are   clear. The cardiac and mediastinal contours and pulmonary vascularity are   normal.  The bones and soft tissues are unremarkable. Multidisciplinary Rounds Completed:  N/A      SPECIAL EQUIPMENT  PA Catheter    DISPOSITION  Stay in ICU    CRITICAL CARE CONSULTANT NOTE  I had a in-person encounter with Sofie Ayala, reviewed and interpreted patient data including events, labs, images, vital signs, I/O's, and examined patient. I have discussed the case and the plan and management of the patient's care with the consulting services, the bedside nurses and the respiratory therapist.      NOTE OF PERSONAL INVOLVEMENT IN CARE   This patient is at high risk for sudden and clinically significant deterioration, which requires the highest level of preparedness to intervene urgently. I participated in the decision-making and personally managed or directed the management of the following life and organ supporting interventions that required my frequent assessment to treat or prevent imminent deterioration. I personally spent -- minutes of critical care time.   This is time spent at patient's bedside actively involved in patient care as well as the coordination of care and discussions with the patient's family. This does not include any procedural time which has been billed separately.     Sherita Ramos MD  Pulmonary/[facility]  [phone] 8/1/2021

## 2021-08-01 NOTE — PROGRESS NOTES
LHC, cor angio, LV gram, IABP placement completed   [place]    Findings    1- severe left main and 3 vessel CAD  2- mildly depressed LV systolic fxn with inf HK  3- nl LVEDP    Plan    Urgent consult CT surgery  D/W Dr David Coombs

## 2021-08-01 NOTE — ED NOTES
Pt arrives from home reporting severe chest pain with SOB, burping, that began yesterday. Pt says nausea began this am. Pt says she has had difficulty walking at distance for the last several weeks. Pt denies cardiac hx.

## 2021-08-01 NOTE — BRIEF OP NOTE
BRIEF OP NOTE  Pre-Op Diagnosis: CAD    Post-Op Diagnosis: CAD      Procedure:   ON PUMP CORONARY ARTERY BYPASS GRAFT X 2 WITH LEFT INTERNAL MAMMARY ARTERY to LAD, RSVG to OM  GREATER LEFT SAPHENOUS VEIN EVH    Surgeon: Clementine Caba MD    Assistant(s): Camelia Richmond PA-C    Anesthesia: General     Infusions: Amiodarone, precedex, insulin, jasson    Estimated Blood Loss:  350ml    Cell Saver:  250ml    Drains and pacing wires: 2 atrial wires, 1 bipolar ventricular wire, 3 omari drains    Complications: None    Findings: CAD

## 2021-08-01 NOTE — ANESTHESIA PREPROCEDURE EVALUATION
Relevant Problems   No relevant active problems       Anesthetic History   No history of anesthetic complications            Review of Systems / Medical History  Patient summary reviewed, nursing notes reviewed and pertinent labs reviewed    Pulmonary  Within defined limits                 Neuro/Psych   Within defined limits           Cardiovascular        Angina      Past MI and CAD    Exercise tolerance: <4 METS  Comments: LM disease, IABP   GI/Hepatic/Renal     GERD: well controlled           Endo/Other      Hypothyroidism       Other Findings            Physical Exam    Airway  Mallampati: II  TM Distance: 4 - 6 cm  Neck ROM: normal range of motion   Mouth opening: Normal     Cardiovascular    Rhythm: regular  Rate: normal        Comments: IABP Dental  No notable dental hx       Pulmonary  Breath sounds clear to auscultation               Abdominal  GI exam deferred       Other Findings            Anesthetic Plan    ASA: 4, emergent  Anesthesia type: general    Monitoring Plan: Arterial line, BIS, Continuous noninvasive hemodynamic monitoring, CVP, [place] and LIEN    Post procedure ventilation   Induction: Intravenous  Anesthetic plan and risks discussed with: Patient

## 2021-08-01 NOTE — Clinical Note
Single view of the left ventricle obtained using power injection. Total volume = 36 mL. Rate = 12 mL/sec. Pressure = 900 PSI.

## 2021-08-01 NOTE — PERIOP NOTES
1215 - Patient in cath lab on transport stretcher - IABP intact and running - Patient AAO x 3 in NAD - interviewed patient repeats name and confirms consent for emergency CABG - To OR 6 with Dr Ray Brand anesthesiologist,  in charge and Cath Lab staff assistance - Patient arrive in OR 6 @ [phone] for procedure - Patients ring removed from left hand along with umbilical jewelry - Placed in bag with Label - given to T.  [address]  to give to CCU Staff

## 2021-08-02 ENCOUNTER — APPOINTMENT (OUTPATIENT)
Dept: GENERAL RADIOLOGY | Age: 66
DRG: 234 | End: 2021-08-02
Attending: PHYSICIAN ASSISTANT
Payer: COMMERCIAL

## 2021-08-02 LAB
ADMINISTERED INITIALS, ADMINIT: NORMAL
ALBUMIN SERPL-MCNC: 3.6 G/DL (ref 3.5–5)
ALBUMIN/GLOB SERPL: 2 {RATIO} (ref 1.1–2.2)
ALP SERPL-CCNC: 38 U/L (ref 45–117)
ALT SERPL-CCNC: 30 U/L (ref 12–78)
ANION GAP SERPL CALC-SCNC: 6 MMOL/L (ref 5–15)
ARTERIAL PATENCY WRIST A: ABNORMAL
AST SERPL-CCNC: 130 U/L (ref 15–37)
ATRIAL RATE: 73 BPM
ATRIAL RATE: 79 BPM
BASE DEFICIT BLDA-SCNC: 0.9 MMOL/L
BDY SITE: ABNORMAL
BILIRUB SERPL-MCNC: 0.3 MG/DL (ref 0.2–1)
BUN SERPL-MCNC: 11 MG/DL (ref 6–20)
BUN/CREAT SERPL: 14 (ref 12–20)
CALCIUM SERPL-MCNC: 8 MG/DL (ref 8.5–10.1)
CALCULATED P AXIS, ECG09: 23 DEGREES
CALCULATED P AXIS, ECG09: 70 DEGREES
CALCULATED R AXIS, ECG10: 24 DEGREES
CALCULATED R AXIS, ECG10: 48 DEGREES
CALCULATED T AXIS, ECG11: -112 DEGREES
CALCULATED T AXIS, ECG11: 4 DEGREES
CHLORIDE SERPL-SCNC: 113 MMOL/L (ref 97–108)
CO2 SERPL-SCNC: 25 MMOL/L (ref 21–32)
CREAT SERPL-MCNC: 0.77 MG/DL (ref 0.55–1.02)
D50 ADMINISTERED, D50ADM: 0 ML
D50 ORDER, D50ORD: 0 ML
DIAGNOSIS, 93000: NORMAL
DIAGNOSIS, 93000: NORMAL
ERYTHROCYTE [DISTWIDTH] IN BLOOD BY AUTOMATED COUNT: 12.9 % (ref 11.5–14.5)
ERYTHROCYTE [DISTWIDTH] IN BLOOD BY AUTOMATED COUNT: 13.1 % (ref 11.5–14.5)
GLOBULIN SER CALC-MCNC: 1.8 G/DL (ref 2–4)
GLSCOM COMMENTS: NORMAL
GLUCOSE BLD STRIP.AUTO-MCNC: 102 MG/DL (ref 65–117)
GLUCOSE BLD STRIP.AUTO-MCNC: 108 MG/DL (ref 65–117)
GLUCOSE BLD STRIP.AUTO-MCNC: 109 MG/DL (ref 65–117)
GLUCOSE BLD STRIP.AUTO-MCNC: 110 MG/DL (ref 65–117)
GLUCOSE BLD STRIP.AUTO-MCNC: 110 MG/DL (ref 65–117)
GLUCOSE BLD STRIP.AUTO-MCNC: 111 MG/DL (ref 65–117)
GLUCOSE BLD STRIP.AUTO-MCNC: 112 MG/DL (ref 65–117)
GLUCOSE BLD STRIP.AUTO-MCNC: 121 MG/DL (ref 65–117)
GLUCOSE BLD STRIP.AUTO-MCNC: 123 MG/DL (ref 65–117)
GLUCOSE BLD STRIP.AUTO-MCNC: 129 MG/DL (ref 65–117)
GLUCOSE BLD STRIP.AUTO-MCNC: 130 MG/DL (ref 65–117)
GLUCOSE BLD STRIP.AUTO-MCNC: 149 MG/DL (ref 65–117)
GLUCOSE BLD STRIP.AUTO-MCNC: 86 MG/DL (ref 65–117)
GLUCOSE BLD STRIP.AUTO-MCNC: 86 MG/DL (ref 65–117)
GLUCOSE BLD STRIP.AUTO-MCNC: 91 MG/DL (ref 65–117)
GLUCOSE BLD STRIP.AUTO-MCNC: 94 MG/DL (ref 65–117)
GLUCOSE BLD STRIP.AUTO-MCNC: 96 MG/DL (ref 65–117)
GLUCOSE BLD STRIP.AUTO-MCNC: 99 MG/DL (ref 65–117)
GLUCOSE SERPL-MCNC: 79 MG/DL (ref 65–100)
GLUCOSE, GLC: 102 MG/DL
GLUCOSE, GLC: 108 MG/DL
GLUCOSE, GLC: 109 MG/DL
GLUCOSE, GLC: 110 MG/DL
GLUCOSE, GLC: 110 MG/DL
GLUCOSE, GLC: 111 MG/DL
GLUCOSE, GLC: 112 MG/DL
GLUCOSE, GLC: 121 MG/DL
GLUCOSE, GLC: 123 MG/DL
GLUCOSE, GLC: 129 MG/DL
GLUCOSE, GLC: 130 MG/DL
GLUCOSE, GLC: 149 MG/DL
GLUCOSE, GLC: 86 MG/DL
GLUCOSE, GLC: 91 MG/DL
GLUCOSE, GLC: 94 MG/DL
GLUCOSE, GLC: 96 MG/DL
GLUCOSE, GLC: 99 MG/DL
HCO3 BLDA-SCNC: 23 MMOL/L (ref 22–26)
HCT VFR BLD AUTO: 22.1 % (ref 35–47)
HCT VFR BLD AUTO: 23 % (ref 35–47)
HGB BLD-MCNC: 7.1 G/DL (ref 11.5–16)
HGB BLD-MCNC: 7.5 G/DL (ref 11.5–16)
HIGH TARGET, HITG: 130 MG/DL
INSULIN ADMINSTERED, INSADM: 0.3 UNITS/HOUR
INSULIN ADMINSTERED, INSADM: 0.4 UNITS/HOUR
INSULIN ADMINSTERED, INSADM: 0.5 UNITS/HOUR
INSULIN ADMINSTERED, INSADM: 0.6 UNITS/HOUR
INSULIN ADMINSTERED, INSADM: 0.7 UNITS/HOUR
INSULIN ADMINSTERED, INSADM: 0.7 UNITS/HOUR
INSULIN ADMINSTERED, INSADM: 0.8 UNITS/HOUR
INSULIN ADMINSTERED, INSADM: 0.9 UNITS/HOUR
INSULIN ADMINSTERED, INSADM: 1.4 UNITS/HOUR
INSULIN ADMINSTERED, INSADM: 1.6 UNITS/HOUR
INSULIN ORDER, INSORD: 0.3 UNITS/HOUR
INSULIN ORDER, INSORD: 0.4 UNITS/HOUR
INSULIN ORDER, INSORD: 0.5 UNITS/HOUR
INSULIN ORDER, INSORD: 0.6 UNITS/HOUR
INSULIN ORDER, INSORD: 0.7 UNITS/HOUR
INSULIN ORDER, INSORD: 0.7 UNITS/HOUR
INSULIN ORDER, INSORD: 0.8 UNITS/HOUR
INSULIN ORDER, INSORD: 0.9 UNITS/HOUR
INSULIN ORDER, INSORD: 1.4 UNITS/HOUR
INSULIN ORDER, INSORD: 1.6 UNITS/HOUR
LOW TARGET, LOT: 95 MG/DL
MAGNESIUM SERPL-MCNC: 2.3 MG/DL (ref 1.6–2.4)
MCH RBC QN AUTO: 31 PG (ref 26–34)
MCH RBC QN AUTO: 31.6 PG (ref 26–34)
MCHC RBC AUTO-ENTMCNC: 32.1 G/DL (ref 30–36.5)
MCHC RBC AUTO-ENTMCNC: 32.6 G/DL (ref 30–36.5)
MCV RBC AUTO: 96.5 FL (ref 80–99)
MCV RBC AUTO: 97 FL (ref 80–99)
MINUTES UNTIL NEXT BG, NBG: 120 MIN
MINUTES UNTIL NEXT BG, NBG: 60 MIN
MULTIPLIER, MUL: 0.01
MULTIPLIER, MUL: 0.02
MULTIPLIER, MUL: 0.03
MULTIPLIER, MUL: 0.04
MULTIPLIER, MUL: 0.04
NRBC # BLD: 0 K/UL (ref 0–0.01)
NRBC # BLD: 0 K/UL (ref 0–0.01)
NRBC BLD-RTO: 0 PER 100 WBC
NRBC BLD-RTO: 0 PER 100 WBC
ORDER INITIALS, ORDINIT: NORMAL
P-R INTERVAL, ECG05: 150 MS
P-R INTERVAL, ECG05: 242 MS
PCO2 BLDA: 36 MMHG (ref 35–45)
PH BLDA: 7.43 [PH] (ref 7.35–7.45)
PLATELET # BLD AUTO: 146 K/UL (ref 150–400)
PLATELET # BLD AUTO: 154 K/UL (ref 150–400)
PMV BLD AUTO: 11 FL (ref 8.9–12.9)
PMV BLD AUTO: 11.5 FL (ref 8.9–12.9)
PO2 BLDA: 158 MMHG (ref 80–100)
POTASSIUM SERPL-SCNC: 4 MMOL/L (ref 3.5–5.1)
PROT SERPL-MCNC: 5.4 G/DL (ref 6.4–8.2)
Q-T INTERVAL, ECG07: 368 MS
Q-T INTERVAL, ECG07: 384 MS
QRS DURATION, ECG06: 72 MS
QRS DURATION, ECG06: 86 MS
QTC CALCULATION (BEZET), ECG08: 421 MS
QTC CALCULATION (BEZET), ECG08: 423 MS
RBC # BLD AUTO: 2.29 M/UL (ref 3.8–5.2)
RBC # BLD AUTO: 2.37 M/UL (ref 3.8–5.2)
SAO2 % BLD: 99 % (ref 92–97)
SAO2% DEVICE SAO2% SENSOR NAME: ABNORMAL
SERVICE CMNT-IMP: ABNORMAL
SERVICE CMNT-IMP: NORMAL
SODIUM SERPL-SCNC: 144 MMOL/L (ref 136–145)
SPECIMEN SITE: ABNORMAL
VENTRICULAR RATE, ECG03: 73 BPM
VENTRICULAR RATE, ECG03: 79 BPM
WBC # BLD AUTO: 10.7 K/UL (ref 3.6–11)
WBC # BLD AUTO: 11.8 K/UL (ref 3.6–11)

## 2021-08-02 PROCEDURE — 74011250636 HC RX REV CODE- 250/636: Performed by: PHYSICIAN ASSISTANT

## 2021-08-02 PROCEDURE — 2709999900 HC NON-CHARGEABLE SUPPLY

## 2021-08-02 PROCEDURE — P9016 RBC LEUKOCYTES REDUCED: HCPCS

## 2021-08-02 PROCEDURE — 82962 GLUCOSE BLOOD TEST: CPT

## 2021-08-02 PROCEDURE — 36415 COLL VENOUS BLD VENIPUNCTURE: CPT

## 2021-08-02 PROCEDURE — 74011000250 HC RX REV CODE- 250: Performed by: PHYSICIAN ASSISTANT

## 2021-08-02 PROCEDURE — 5A1221Z PERFORMANCE OF CARDIAC OUTPUT, CONTINUOUS: ICD-10-PCS | Performed by: THORACIC SURGERY (CARDIOTHORACIC VASCULAR SURGERY)

## 2021-08-02 PROCEDURE — 74011000258 HC RX REV CODE- 258: Performed by: PHYSICIAN ASSISTANT

## 2021-08-02 PROCEDURE — 021009W BYPASS CORONARY ARTERY, ONE ARTERY FROM AORTA WITH AUTOLOGOUS VENOUS TISSUE, OPEN APPROACH: ICD-10-PCS | Performed by: THORACIC SURGERY (CARDIOTHORACIC VASCULAR SURGERY)

## 2021-08-02 PROCEDURE — 02100Z9 BYPASS CORONARY ARTERY, ONE ARTERY FROM LEFT INTERNAL MAMMARY, OPEN APPROACH: ICD-10-PCS | Performed by: THORACIC SURGERY (CARDIOTHORACIC VASCULAR SURGERY)

## 2021-08-02 PROCEDURE — 36430 TRANSFUSION BLD/BLD COMPNT: CPT

## 2021-08-02 PROCEDURE — 82803 BLOOD GASES ANY COMBINATION: CPT

## 2021-08-02 PROCEDURE — 80053 COMPREHEN METABOLIC PANEL: CPT

## 2021-08-02 PROCEDURE — 77030000299 HC FEMSTP COMP GLD STJU -B

## 2021-08-02 PROCEDURE — 94003 VENT MGMT INPAT SUBQ DAY: CPT

## 2021-08-02 PROCEDURE — 74011250637 HC RX REV CODE- 250/637: Performed by: PHYSICIAN ASSISTANT

## 2021-08-02 PROCEDURE — 85027 COMPLETE CBC AUTOMATED: CPT

## 2021-08-02 PROCEDURE — 77010033678 HC OXYGEN DAILY

## 2021-08-02 PROCEDURE — 65620000000 HC RM CCU GENERAL

## 2021-08-02 PROCEDURE — 71045 X-RAY EXAM CHEST 1 VIEW: CPT

## 2021-08-02 PROCEDURE — 83735 ASSAY OF MAGNESIUM: CPT

## 2021-08-02 PROCEDURE — 99232 SBSQ HOSP IP/OBS MODERATE 35: CPT | Performed by: CLINICAL NURSE SPECIALIST

## 2021-08-02 PROCEDURE — 06BQ4ZZ EXCISION OF LEFT SAPHENOUS VEIN, PERCUTANEOUS ENDOSCOPIC APPROACH: ICD-10-PCS | Performed by: THORACIC SURGERY (CARDIOTHORACIC VASCULAR SURGERY)

## 2021-08-02 RX ORDER — KETOROLAC TROMETHAMINE 30 MG/ML
15 INJECTION, SOLUTION INTRAMUSCULAR; INTRAVENOUS EVERY 6 HOURS
Status: COMPLETED | OUTPATIENT
Start: 2021-08-02 | End: 2021-08-03

## 2021-08-02 RX ORDER — SODIUM CHLORIDE 9 MG/ML
250 INJECTION, SOLUTION INTRAVENOUS AS NEEDED
Status: DISCONTINUED | OUTPATIENT
Start: 2021-08-02 | End: 2021-08-04

## 2021-08-02 RX ORDER — ATORVASTATIN CALCIUM 40 MG/1
40 TABLET, FILM COATED ORAL DAILY
Status: DISCONTINUED | OUTPATIENT
Start: 2021-08-02 | End: 2021-08-06 | Stop reason: HOSPADM

## 2021-08-02 RX ORDER — NOREPINEPHRINE BITARTRATE/D5W 8 MG/250ML
.5-16 PLASTIC BAG, INJECTION (ML) INTRAVENOUS
Status: DISCONTINUED | OUTPATIENT
Start: 2021-08-02 | End: 2021-08-04

## 2021-08-02 RX ADMIN — ACETAMINOPHEN 1000 MG: 500 TABLET ORAL at 17:17

## 2021-08-02 RX ADMIN — CEFAZOLIN 2 G: 1 INJECTION, POWDER, FOR SOLUTION INTRAMUSCULAR; INTRAVENOUS at 22:00

## 2021-08-02 RX ADMIN — PHENYLEPHRINE HYDROCHLORIDE 30 MCG/MIN: 10 INJECTION INTRAVENOUS at 01:05

## 2021-08-02 RX ADMIN — CHLORHEXIDINE GLUCONATE 0.12% ORAL RINSE 10 ML: 1.2 LIQUID ORAL at 08:44

## 2021-08-02 RX ADMIN — ACETAMINOPHEN 1000 MG: 500 TABLET ORAL at 05:13

## 2021-08-02 RX ADMIN — KETOROLAC TROMETHAMINE 15 MG: 30 INJECTION, SOLUTION INTRAMUSCULAR; INTRAVENOUS at 23:56

## 2021-08-02 RX ADMIN — MORPHINE SULFATE 4 MG: 4 INJECTION INTRAVENOUS at 03:32

## 2021-08-02 RX ADMIN — MORPHINE SULFATE 4 MG: 4 INJECTION INTRAVENOUS at 08:37

## 2021-08-02 RX ADMIN — GABAPENTIN 100 MG: 100 CAPSULE ORAL at 21:50

## 2021-08-02 RX ADMIN — CEFAZOLIN 2 G: 1 INJECTION, POWDER, FOR SOLUTION INTRAMUSCULAR; INTRAVENOUS at 17:18

## 2021-08-02 RX ADMIN — CEFAZOLIN 2 G: 1 INJECTION, POWDER, FOR SOLUTION INTRAMUSCULAR; INTRAVENOUS at 04:30

## 2021-08-02 RX ADMIN — MUPIROCIN: 20 OINTMENT TOPICAL at 08:41

## 2021-08-02 RX ADMIN — ONDANSETRON 4 MG: 2 INJECTION INTRAMUSCULAR; INTRAVENOUS at 11:14

## 2021-08-02 RX ADMIN — ONDANSETRON 4 MG: 2 INJECTION INTRAMUSCULAR; INTRAVENOUS at 17:17

## 2021-08-02 RX ADMIN — LEVOTHYROXINE SODIUM 100 MCG: 0.1 TABLET ORAL at 08:12

## 2021-08-02 RX ADMIN — CEFAZOLIN 2 G: 1 INJECTION, POWDER, FOR SOLUTION INTRAMUSCULAR; INTRAVENOUS at 09:23

## 2021-08-02 RX ADMIN — ACETAMINOPHEN 1000 MG: 500 TABLET ORAL at 23:56

## 2021-08-02 RX ADMIN — DEXMEDETOMIDINE HYDROCHLORIDE 0.2 MCG/KG/HR: 100 INJECTION, SOLUTION, CONCENTRATE INTRAVENOUS at 03:05

## 2021-08-02 RX ADMIN — NOREPINEPHRINE BITARTRATE 5 MCG/MIN: 1 INJECTION, SOLUTION, CONCENTRATE INTRAVENOUS at 12:26

## 2021-08-02 RX ADMIN — GABAPENTIN 100 MG: 100 CAPSULE ORAL at 08:22

## 2021-08-02 RX ADMIN — PHENYLEPHRINE HYDROCHLORIDE 40 MCG/MIN: 10 INJECTION INTRAVENOUS at 05:56

## 2021-08-02 RX ADMIN — PHENYLEPHRINE HYDROCHLORIDE 10 MCG/MIN: 10 INJECTION INTRAVENOUS at 17:54

## 2021-08-02 RX ADMIN — Medication 10 ML: at 15:13

## 2021-08-02 RX ADMIN — Medication 10 ML: at 05:13

## 2021-08-02 RX ADMIN — MORPHINE SULFATE 4 MG: 4 INJECTION INTRAVENOUS at 17:17

## 2021-08-02 RX ADMIN — KETOROLAC TROMETHAMINE 15 MG: 30 INJECTION, SOLUTION INTRAMUSCULAR; INTRAVENOUS at 17:17

## 2021-08-02 RX ADMIN — FAMOTIDINE 20 MG: 20 TABLET ORAL at 08:12

## 2021-08-02 RX ADMIN — KETOROLAC TROMETHAMINE 15 MG: 30 INJECTION, SOLUTION INTRAMUSCULAR; INTRAVENOUS at 11:03

## 2021-08-02 RX ADMIN — CHLORHEXIDINE GLUCONATE 0.12% ORAL RINSE 10 ML: 1.2 LIQUID ORAL at 21:50

## 2021-08-02 RX ADMIN — MUPIROCIN: 20 OINTMENT TOPICAL at 21:51

## 2021-08-02 RX ADMIN — Medication 10 ML: at 21:51

## 2021-08-02 RX ADMIN — ASPIRIN 81 MG: 81 TABLET, CHEWABLE ORAL at 08:22

## 2021-08-02 NOTE — PROCEDURES
IABP removed without issue. Dopplerable pedal pulse. Manual pressure held for 15 mins, hemostasis achieved, femostop applied. Hemodynamics stable.

## 2021-08-02 NOTE — CARDIO/PULMONARY
Cardiac Rehab Note: chart review/referral     Consult has not been acknowledged     Pt is a 62 yo NSTEMI, s/p emergent CABG x2, 8/1/21. Smoking history assessed. Patient is a never smoker. Smoking Cessation Program link has not been added to the AVS.     EF 52%  on 8/1/21 per LIEN. Patient was not available. Pt extubated today. CP Rehab will follow up when pt medically stable.

## 2021-08-02 NOTE — PROGRESS NOTES
PT note:     Orders received and acknowledged. Chart reviewed and spoke with nursing. Patient just extubated around [phone] and plans to remove IABP soon. Will defer follow up for PT evaluation when medically stable. 10:45: IABP pulled at 1025. Per guidelines, patient will be on bedrest for 6 hours. RN stated will get patient to chair once bedrest lifted. Will follow up tomorrow for PT evaluation.        Bryson Santana, PT, DPT

## 2021-08-02 NOTE — PROGRESS NOTES
Dr. Boby Wang at bedside. Patient currently on CPAP, 40%. Propofol off. Precedex remains at 0.5. Will decrease to . 3.     air leak noted in chest tubes.

## 2021-08-02 NOTE — PROGRESS NOTES
1025  IABP removed by CURT Lee. Manual pressure held. 1045  femstop applied. No oozing or hematoma noted. 1115 femstop down to 120mmhg. 1120 sudden wave of nausea and vomited X2. zofran given. [facility], notified of low urine output, and persistent low BP. Currently on jasson gtt at [de-identified] mcgs. Will start levophed drip as ordered, and increase pacemaker back to 90 from 80.

## 2021-08-02 NOTE — PROGRESS NOTES
Problem: Non-Violent Restraints  Goal: Removal from restraints as soon as assessed to be safe  Outcome: Progressing Towards Goal  Goal: No harm/injury to patient while restraints in use  8/1/2021 2136 by Penelope Pedraza RN  Outcome: Progressing Towards Goal  8/1/2021 2134 by Penelope Pedraza RN  Outcome: Progressing Towards Goal  Goal: Patient's dignity will be maintained  Outcome: Progressing Towards Goal  Goal: Patient Interventions  Outcome: Progressing Towards Goal     Problem: Ventilator Management  Goal: *Adequate oxygenation and ventilation  8/1/2021 2136 by Penelope Pedraza RN  Outcome: Progressing Towards Goal  8/1/2021 2134 by Penelope Pedraza RN  Outcome: Progressing Towards Goal  Goal: *Patient maintains clear airway/free of aspiration  8/1/2021 2136 by Penelope Pedraza RN  Outcome: Progressing Towards Goal  8/1/2021 2134 by Penelope Pedraza RN  Outcome: Progressing Towards Goal  Goal: *Absence of infection signs and symptoms  8/1/2021 2136 by Penelope Pedraza RN  Outcome: Progressing Towards Goal  8/1/2021 2134 by Penelope Pedraza RN  Outcome: Progressing Towards Goal  Goal: *Normal spontaneous ventilation  8/1/2021 2136 by Penelope Pedraza RN  Outcome: Progressing Towards Goal  8/1/2021 2134 by Penelope Pedraza RN  Outcome: Progressing Towards Goal     Problem: Falls - Risk of  Goal: *Absence of Falls  Description: Document Saba Gamboa Fall Risk and appropriate interventions in the flowsheet.   8/1/2021 2136 by Penelope Pedraza RN  Outcome: Progressing Towards Goal  Note: Fall Risk Interventions:      Medication Interventions: Assess postural VS orthostatic hypotension, Bed/chair exit alarm, Evaluate medications/consider consulting pharmacy    Elimination Interventions: Bed/chair exit alarm, Call light in reach    8/1/2021 2134 by Penelope Pedraza RN  Outcome: Progressing Towards Goal  Note: Fall Risk Interventions:        Medication Interventions: Assess postural VS orthostatic hypotension, Bed/chair exit alarm, Evaluate medications/consider consulting pharmacy    Elimination Interventions: Bed/chair exit alarm, Call light in reach       Problem: Patient Education: Go to Patient Education Activity  Goal: Patient/Family Education  Outcome: Progressing Towards Goal     Problem: Pressure Injury - Risk of  Goal: *Prevention of pressure injury  Description: Document Live Scale and appropriate interventions in the flowsheet.   Outcome: Progressing Towards Goal  Note: Pressure Injury Interventions:  Sensory Interventions: Assess changes in LOC, Assess need for specialty bed         Activity Interventions: Assess need for specialty bed    Mobility Interventions: Assess need for specialty bed    Nutrition Interventions: Document food/fluid/supplement intake         Problem: Patient Education: Go to Patient Education Activity  Goal: Patient/Family Education  Outcome: Progressing Towards Goal     Problem: CABG: Day of Surgery (Initiate SCIP measures for post-op care)  Goal: Off Pathway (Use only if patient is Off Pathway)  Outcome: Progressing Towards Goal  Goal: Activity/Safety  Outcome: Progressing Towards Goal  Goal: Consults, if ordered  Outcome: Progressing Towards Goal  Goal: Diagnostic Test/Procedures  Outcome: Progressing Towards Goal  Goal: Nutrition/Diet  Outcome: Progressing Towards Goal  Goal: Medications  Outcome: Progressing Towards Goal  Goal: *Stable cardiac rhythm  Outcome: Progressing Towards Goal  Goal: *Follows simple commands post anesthesia  Outcome: Progressing Towards Goal  Goal: *Orients easily following extubation  Outcome: Progressing Towards Goal  Goal: *Optimal pain control at patient's stated goal  Outcome: Progressing Towards Goal

## 2021-08-02 NOTE — PROGRESS NOTES
08/02/21 0344   ABCDEF Bundle   SBT Safety Screen Passed No   SBT Screen Reason for Failure   (Patient on Balloon Pump)

## 2021-08-02 NOTE — OP NOTES
Coronary Artery Bypass Graft Procedure Note      Pre-operative Diagnosis:                                             Unstable angina                                             NSTEMI                                              Severe L main CAD                                             Emergent CABG     Post-operative Diagnosis: same    Surgeon: Gale Cantor MD    Assistant: CURT Ríos    Anesthesia: General endotracheal anesthesia    EBL:  See perfusion record    Specimen none    Implants none    Complications none        Operation: Coronary Bypass Grafting Procedure(s):  LIEN AND EPIAORTIC ULTRASOUND BY DR Victor Manuel Cook - ON PUMP CORONARY ARTERY BYPASS GRAFT X 2   LEFT INTERNAL MAMMARY ARTERY to LAD   AND GREATER LEFT SAPHENOUS VEIN GRAFTING to OM   AND EVH    Operative Findings: At the time of the procedure there was minimal  depression ov LV function . The coronary arteries were diffusely diseased The JINNY was good quality with good flow. The vein was of good quality. Indications:  See pre op diagnosis     Procedure Details     After informed consent was obtained for the above mentioned procedure, the patient was then taken emergently  to the operating room. Appropriate monitoring lines were placed by the Anesthesia Department. A complete surgical timeout was completed. The LIEN report was reviewed with anesthesia. After administration of general endotracheal anesthesia, the patient was prepped and draped in the usual sterile fashion. The chest was then entered through a standard mediastinotomy incision while simultaneously harvesting of peripheral conduit was undertaken using endoscopic technique following a bolus of heparin. After harvesting the conduit, it was inspected and noted to be adequate. The epiaortic ultrasound was performed and reviewed. The left internal mammary harvested was then undertaken in pedicle distal pedicle was incised and noted to bleed briskly.   JINNY was prepared with papaverine. The pericardium was then opened and tacked up into a pericardial well. The patient was heparinized. Pursestring sutures were then placed into the aorta, the right atrial appendage. The patient was then cannulated through these pursestrings at which point, cardiopulmonary bypass was started. A retrograde coronary sinus canula was placed for cardioplegia administration. A cross-clamp was then applied. Cardioplegia was administered initially and then given intermittently throughout the case. Initial attention was directed at the circumflex vessels. SVG to the OM . The LIMA was used to graft the LAD. The proximal anastomosis of the vein graphs constructed to the ascending aorta. A warm dose of cardioplegia administered. The patient was weaned from bypass. Protamine was administered. The aortic cannula and venous cannula removed. The sites were reinforced. Ventricular and atrial pacing wires were then placed upon the heart, brought out through stab wounds inferiorly, and affixed to the skin. Mediastinal and L pleural chest tubes placed. . Hemostasis was assured, the sternum was reapproximated with heavy gauge stainless steel wire. The midline fascia was subsequently closed separately. Vicryl was then used in a running fashion for subcutaneous tissue and skin. Dressings were then applied to all wounds. The patient was then transported with monitors to the intensive care unit, intubated and ventilated. Physician Assistant  (PA) assistance was required due to the difficulty of the procedure. The PA assisted in the dissection of tissues, identification and exposure of pertinent anatomy including endoscopic harvest of the greater saphenous vein, and sternal closure in the operation to assure optimal patient outcome and safe conduct of the operation.                   Claudeen Alas, MD

## 2021-08-02 NOTE — PROCEDURES
[facility] CATH    Name:  Serena Torres  MR#:  [id number]  :  1955  ACCOUNT #:  [de-identified]  DATE OF SERVICE:  2021      PROCEDURES PERFORMED:  Left heart catheterization, coronary angiography, left ventriculography, intra-aortic balloon pump placement. SURGEON:  Zhao Navarro MD    ESTIMATED BLOOD LOSS:  Less than 20 mL. SPECIMENS REMOVED:  None. COMPLICATIONS:  None. ANESTHESIA:  Local with conscious sedation. INDICATION:  Non-ST segment elevation myocardial infarction. TECHNIQUE:  Right femoral artery via Brittney. CATHETERS USED:  5-Armenian pigtail, 5-Armenian JL4, 5-Armenian JR4, 7.5 Armenian 40 mL Datascope intra-aortic balloon pump. MEDICATIONS USED:  Please see the cath lab log sheet. FINDINGS:  1. Hemodynamics:  Aortic pressure was 105/58 with a mean of 76. Left ventricular pressure was 110/4 with an LVEDP of 10. There was no significant gradient on pullback across the aortic valve. 2.  Left ventriculography done from the LUTZ view revealed a normal size left ventricle with mildly depressed systolic function. Estimated EF was 35%-40%. There was posterior basal and inferior hypokinesis. No significant mitral regurgitation was seen. 3.  Coronary angiography revealed a right-dominant system. There was severe diffuse disease. The left main vessel was a moderate-sized vessel with an 80% distal stenosis. There was a 70% ostial LAD followed by a 70% proximal LAD lesion. The LAD was a moderate-sized vessel wrapping around the apex. There was a large diagonal branch with a long area of up to 70% narrowing. The circumflex had a 70% proximal lesion followed by an 80% mid lesion. There were two moderate-sized marginal branches without significant disease. The right coronary artery was a moderate-sized dominant vessel, which was totally occluded in its midportion.   The distal vessel included a small PDA and small posterolateral branch that appeared diffusely diseased and were visualized via left-to-right collaterals. 4.  At the conclusion of the case, a 7.5-Tajik intra-aortic balloon pump was placed under standard technique through the right femoral artery at the previous access site and was placed on 1:1 counterpulsation. There were no complications. CONCLUSIONS:  1. Severe left main and three-vessel coronary artery disease. 2.  Mildly depressed LV systolic function with wall motion abnormalities noted above. 3.  Normal resting LVEDP. 4.  Intra-aortic balloon pump placed via right femoral artery as described above. PLAN:  Urgent CT Surgery consultation.         Jeannette Broussard MD      [id number]  D:  08/02/2021 15:08  T:  08/02/2021 16:19  JOB #:  [id number]  CC:  Hany Johnson MD

## 2021-08-02 NOTE — PROGRESS NOTES
Orders received and acknowledged. Chart reviewed and spoke with nursing. Patient just extubated around [phone] and plans to remove IABP soon. Will defer follow up for OT evaluation when medically stable.      10:45: IABP pulled at 1025. Per guidelines, patient will be on bedrest for 6 hours. RN stated will get patient to chair once bedrest lifted.  Will follow up tomorrow for OT evaluation.

## 2021-08-02 NOTE — PROGRESS NOTES
1900 - Verbal shift change report given to TOMASZ Ramírez (oncoming nurse) by Christelle Malloy RN (offgoing nurse). Report included the following information SBAR, Kardex, Intake/Output, MAR, Recent Results, Cardiac Rhythm paced, and dual skin assessment at the bedside. 2000 - Assessment complete. Receiving oxygen via Nasal cannula 2L. Lines:  PIV: x2, CVP, R radial Art line,R MAC draining Chest tube to suction, draining Dumont,. Infusions:  Levophed 7mcg, Michael-synephrine 10mcg,. Insulin (glucostablizer),    Skin/Wounds: R groin site, CDI. Sternal incision CDI. LSVG CDI with bandage. 0000 - Reassessment complete. VSS.    0430 - Reassessment complete. VSS. Labs sent. attempted to draw labs , no success. Flushed. Waveform flattened. Will rezero, if still flat will remove. 0500 Re-zeroed with a better waveform    0600 CHG and soap/water/basin bath given. 0630 Pt placed in chair. End of Shift Note    Bedside shift change report given to [address] [place] [address] [address] (oncoming nurse) by Kavya Laws RN (offgoing nurse).   Report included the following information SBAR, Kardex, ED Summary, Intake/Output, MAR and Recent Results    Shift worked:  7p-7a     Shift summary and any significant changes:     see above     Concerns for physician to address:  none     [facility]   [phone]       Activity:  Activity Level: Bed Rest  Number times ambulated in hallways past shift: 0  Number of times OOB to chair past shift: 1    Cardiac:   Cardiac Monitoring: Yes      Cardiac Rhythm: Atrial Paced    Access:   Current line(s): PIV and central line     Genitourinary:   Urinary status: dumont    Respiratory:   O2 Device: Nasal cannula  Chronic home O2 use?: NO  Incentive spirometer at bedside: YES     GI:     Current diet:  No diet orders on file  Passing flatus: YES  Tolerating current diet: YES       Pain Management:   Patient states pain is manageable on current regimen: YES    Skin:  Live Score: 15  Interventions: speciality bed    Patient Safety:  Fall Score:  Total Score: 3  Interventions: bed/chair alarm  High Fall Risk: Yes    Length of Stay:  Expected LOS: 7d 16h  Actual LOS: 2      Momo Parker RN

## 2021-08-02 NOTE — PROCEDURES
[address]  LIEN    Name:  Patricia Ha  MR#:  [id number]  :  1955  ACCOUNT #:  [de-identified]  DATE OF SERVICE:  2021    TRANSESOPHAGEAL ECHOCARDIOGRAPHIC REPORT    SURGEON:  Bella Barbosa MD    INDICATION:  The transesophageal echocardiographic exam was requested by the surgeon in order to evaluate real-time cardiac and valvular form and function in a patient scheduled for coronary artery bypass grafting on cardiopulmonary bypass secondary to coronary artery disease, including but not limited to left main disease. The patient was status post intra-aortic balloon pump placement for angina. The transesophageal echocardiographic probe was easily and atraumatically inserted into the patient's esophagus while the patient was sedated under general anesthesia inside the operating room. Modalities incorporated included 2-D, 3-D, color-flow mode, pulsed-wave Doppler and continuous wave Doppler. EPIAORTIC:  The epiaortic exam was requested by the surgeon in order to evaluate the patient's aorta for cross-clamp and bypass site locations. The epiaortic probe was sterilely handed to the surgeon, who obtained short axis views of the patient's aorta. AORTA    ASCENDING AORTA. The ascending aorta measured 2.7 cm in diameter. There was no evidence of dissection. There was minimal and nonmobile plaque. AORTIC ARCH. The aortic arch measured 2.4 cm in diameter. There was no evidence of dissection. There was minimal and nonmobile plaque. DESCENDING AORTA. The descending aorta measured 1.7 cm in diameter. There was no evidence of dissection. There was minimal and nonmobile plaque. There was an intra-aortic balloon pump below the level of the subclavian artery. VALVES    AORTIC VALVE. The aortic valve annulus measured 2.4 cm in diameter. There was no evidence of aortic valve stenosis, maximum velocity through the valve was 106 cm/sec.   The peak gradient was 5 mmHg.  The mean gradient was 2 mmHg. There was no significant aortic insufficiency. The aortic leaflet morphology and motion were both normal.    MITRAL VALVE. The mitral valve annulus measured 3.4 cm. There was no mitral valve stenosis. There was trace mitral insufficiency. The mitral valve leaflet morphology and motion were normal.    TRICUSPID VALVE. The tricuspid valve annulus measured 3.1 cm. There was no tricuspid valve stenosis. There was trace tricuspid insufficiency. The tricuspid leaflet morphology and motion were both normal.    PULMONIC VALVE. The pulmonic valve annulus measured 2.1 cm. There was no pulmonic stenosis. There was trace pulmonic insufficiency. The pulmonic leaflet morphology and motion were both normal.    ATRIA    RIGHT ATRIUM:  The right atrial size was normal.  There was no spontaneous echo contrast.  There was no right atrial thrombus or tumor. A pulmonary artery catheter was visualized. LEFT ATRIUM:  The left atrial size was normal.  There was no spontaneous echo contrast.  There was no left atrial thrombus or tumor and no device was visualized. LEFT ATRIAL APPENDAGE:  There was no thrombus visualized within the left atrial appendage. Pulsed-wave Doppler within the appendage measured 35 cm/sec. INTERATRIAL SEPTUM:  The interatrial septum morphology was normal.  There was no patent foramen ovale with color-flow mode. VENTRICLES    RIGHT VENTRICLE:  The right ventricular cavity size was upper normal.  There was no right ventricular hypertrophy. There was no right ventricular thrombus and the right ventricular ejection fraction was normal.    LEFT VENTRICLE. The left ventricular cavity size was normal.  There was no left ventricular hypertrophy. There was no left ventricular thrombus and the left ventricular ejection fraction was calculated at 52%. INTERVENTRICULAR SEPTUM. The interventricular septum morphology was normal.    REGIONAL FUNCTION.   There was mid inferior wall hypokinesis. PERICARDIUM:  The pericardium was normal.    PLEURAE:  The pleurae were normal.    POST INTERVENTION FOLLOW-UP STUDY. After coronary artery bypass grafting of two vessels on cardiopulmonary bypass, the left ventricular ejection fraction was 55%. There was improved inferior wall motion. The right ventricular function was preserved. There were no changes in valvular function. There were no effusions and the patient's ascending aorta was intact. The intra-aortic balloon pump position was unchanged. These results were discussed and viewed with cardiac surgeon. The transesophageal echocardiographic probe was easily and atraumatically removed from the patient's esophagus. The patient tolerated the procedure without event.         Lubna Miguel DO      [id number]  D:  08/01/2021 16:04  T:  08/02/2021 0:59  JOB #:  [id number]

## 2021-08-02 NOTE — INTERDISCIPLINARY ROUNDS
Interdisciplinary team rounds were held 8/2/2021 with the following team members:Care Management, Diabetes Management, Nursing, Nutrition, Pharmacy, Physical Therapy, Physician, Respiratory Therapy and Clinical Coordinator. Plan of care discussed. See clinical pathway and/or care plan for interventions and desired outcomes.

## 2021-08-02 NOTE — PROGRESS NOTES
1500 Bedside shift report given by Sarah Dove RN. Report included SBAR, Kardex, MAR, Recent Results, Intake/Output, Cardiac Rhythm (A-paced) and events overnight. This RN resumes care of patient at this time    65 Dr. Elier Laboy at bedside, plan of care discussed, updates given, orders received to remove swan    1600 evening assessment completed at this time, swan removed at this time. H/H collected and sent to lab    1615 results of [facility] given to [facility], orders received to transfuse 1 unit of PRBCs    1715 patient repositioned in bed, patient is feeling nausous    1730 morphine and zofran given, iv abx and tordal given, patient unable to swallow meds due to nausousness. 1745 1unit of blood transfusing now. Patient has a temp of 100.3, tylenol given    1800 15 mins VS check, patient oral temp 100.6, notified shannan Godoy to hodan transfusion at this time.     1900 BSR given to TOMASZ Ramírez

## 2021-08-02 NOTE — PROGRESS NOTES
[facility]      Name: Ila Botello   : 1955   MRN: [id number]   Date: 2021      Assessment:   Brief patient summary:  63 F former smoker with hx of hypothyroidism, GERD presented to [address] [facility] ED  with CP radiating to her jaw. EKG revealed Marked ST abnormality, possible inferolateral subendocardial injury. Emergent LHC revealed severe multivessel coronary artery disease, including a high-grade left main, left anterior and circumflex. The right coronary artery is chronically occluded and small. She went for emergent CABG . [facility] asked to assist in her ICU care    Hospital course/major results:   As above   Extubated - tolerating well. IABP removed. Active Problems: (By systems)  PRINCIPLE DIAGNOSIS:  1. S/P emergent CABG for critical L main disease      PULMONARY:  2. Mild hypoxemia  3. Former smoker without prior diagnosis of COPD    CARDIOVASCULAR/HEMODYNAMIC:  4. CAD  5. S/P 2V CABG   6. Post op hypotension    RENAL:  7. No issues    GI/NUTRITION:  8. H/O GERD    Current nutritional support:     INFECTIOUS DISEASE  9. No issues    Micro:      Antibiotics:      HEME  10. No issues    NEURO  11. Post op pain, controlled    RASS goal: 0    OTHER  12. GOALS OF CARE/ADVANCED DIRECTIVES  13. CODE STATUS: Full      ICU Comprehensive Plan of Care:     Plans for this Shift:  1. Continue supplemental O2 as needed  2. ICU hemodynamic monitoring  3. MAP goal > 65  4. SBP goal > 100  5. Wean vasopressors for above BP goals  6. Monitor chemistry panels daily  7. Correct electrolytes as indicated  8. Nutrition: NPO for now. Advance diet once able to sit up  9. SUP: IV PPI  10. Glycemic control: insulin gtt  11. Micro and antibiotics as documented above  12. Needs to lie flat for 6 hrs after IABP removal        HPI/Consult/Subjective:   24 Hr Events 2021:   As above    SUBJ:   RASS 0. NAD.  Cognition intact    Past Medical History:      has no past medical history on file. Past Surgical History:      has no past surgical history on file. Home Medications:     Prior to Admission medications    Medication Sig Start Date End Date Taking? Authorizing Provider   levothyroxine (SYNTHROID) 100 mcg tablet Take 100 mcg by mouth Daily (before breakfast). Yes Provider, Historical   green tea leaf extract (GREEN TEA PO) Take  by mouth Every morning. Yes Provider, Historical   OTHER Take  by mouth every evening. CHAMOMILE TEA   Yes Provider, Historical       Allergies/Social/Family History:     No Known Allergies   Social History     Tobacco Use    Smoking status: Never Smoker    Smokeless tobacco: Never Used   Substance Use Topics    Alcohol use: Not Currently      History reviewed. No pertinent family history.         Objective:   Vital Signs:  Visit Vitals  BP (!) 98/49   Pulse 90   Temp (!) 100.5 °F (38.1 °C)   Resp 18   Ht 5' 1\" (1.549 m)   Wt 63 kg (139 lb)   SpO2 100%   BMI 26.26 kg/m²    O2 Flow Rate (L/min): 2 l/min O2 Device: Nasal cannula Temp (24hrs), Av.8 °F (36.6 °C), Min:96.4 °F (35.8 °C), Max:100.5 °F (38.1 °C)    CVP (mmHg): 15 mmHg (21 1200)      Intake/Output:     Intake/Output Summary (Last 24 hours) at 2021 1258  Last data filed at 2021 1228  Gross per 24 hour   Intake 3371.48 ml   Output 3185 ml   Net 186.48 ml       Physical Exam:  GEN: NAD  HEENT: NCAT, sclerae white, ETT present  NECK:  No JVD  CHEST: No wheezes  CARDIAC: NSR, reg, no M  ABD: soft, diminished BS  EXT: Cool, no edema  NEURO: No focal deficits  DERM: No lesions    I have examined the patient on this day 2021 and the above documented exam is accurate including the components that have been copied forward    LABS AND  DATA: Personally reviewed  Recent Labs     21   WBC 10.7 18.6*   HGB 7.5* 9.0*   HCT 23.0* 27.5*    170     Recent Labs     21  04021    144   K 4.0 4.1   * 114*   CO2 25 25   BUN 11 12   CREA 0.77 0.88   GLU 79 98   CA 8.0* 9.0   MG 2.3 2.6*     Recent Labs     08/02/21  0409 08/01/21 2005   AP 38* 47   TP 5.4* 5.9*   ALB 3.6 3.9   GLOB 1.8* 2.0     Recent Labs     08/01/21  1600 08/01/21  1035   INR 1.2*  --    PTP 12.7*  --    APTT 23.5 >130.0*      No results for input(s): PHI, PCO2I, PO2I, FIO2I in the last 72 hours. Recent Labs     08/01/21  0953   TROIQ 0.68*       Hemodynamics:   PAP: PAP Systolic: 43 (49/03/35 9299) CO: CO (l/min): 4.5 l/min (08/02/21 1131)   Wedge:   CI: CI (l/min/m2): 2.8 l/min/m2 (08/02/21 1131)   CVP:  CVP (mmHg): 15 mmHg (08/02/21 1200) SVR:       PVR:       Ventilator Settings:  Mode Rate Tidal Volume Pressure FiO2 PEEP   SIMV   350 ml  12 cm H2O 40 % 5 cm H20     Peak airway pressure: 11 cm H2O    Minute ventilation: 10 l/min        MEDS: Reviewed    Chest X-Ray:  CXR Results  (Last 48 hours)               08/02/21 0440  XR CHEST PORT Final result    Impression:  No significant change. Narrative:  INDICATION:  postop heart       EXAM: CXR Portable. FINDINGS: Portable chest shows support lines/devices show no significant change   since yesterday. IABP appears stable and satisfactory, with tip located several   centimeters below the aortic arch. There is no apparent pneumothorax. Lungs   show no acute findings. Heart size is stable. There is no pulmonary edema. 08/01/21 1615  XR CHEST PORT Final result    Impression:  Lines and tubes as described. No acute process       Narrative:  EXAM: XR CHEST PORT       INDICATION: postop heart       COMPARISON: 0945 hours       FINDINGS: A portable AP radiograph of the chest was obtained at 1550 hours. The   patient is on a cardiac monitor. The NGT terminates below the thoracic inlet. The CTs terminate over the left thorax and mediastinum. The SGC terminates at   the caval atrial junction. 08/01/21 0958  XR CHEST PORT Final result    Impression:      No acute process. Narrative:  EXAM:  XR CHEST PORT       INDICATION:  chest pain       COMPARISON:  None. FINDINGS:       A portable AP radiograph of the chest was obtained at 9:45 hours. The lungs are   clear. The cardiac and mediastinal contours and pulmonary vascularity are   normal.  The bones and soft tissues are unremarkable. Multidisciplinary Rounds Completed:  Yes      SPECIAL EQUIPMENT  PA Catheter    DISPOSITION  Stay in ICU    CRITICAL CARE CONSULTANT NOTE  I had a in-person encounter with Kaye Paniagua, reviewed and interpreted patient data including events, labs, images, vital signs, I/O's, and examined patient. I have discussed the case and the plan and management of the patient's care with the consulting services, the bedside nurses and the respiratory therapist.      NOTE OF PERSONAL INVOLVEMENT IN CARE   This patient is at high risk for sudden and clinically significant deterioration, which requires the highest level of preparedness to intervene urgently. I participated in the decision-making and personally managed or directed the management of the following life and organ supporting interventions that required my frequent assessment to treat or prevent imminent deterioration. I personally spent -- minutes of critical care time. This is time spent at patient's bedside actively involved in patient care as well as the coordination of care and discussions with the patient's family. This does not include any procedural time which has been billed separately.     Timur Danielson MD  Pulmonary/[facility]  [phone]  8/2/2021

## 2021-08-02 NOTE — PROGRESS NOTES
[facility] ICU Progress Note    Admit Date: 2021  POD:  1 Day Post-Op    Procedure:  Procedure(s):  LIEN AND EPIAORTIC ULTRASOUND BY DR Dickson Cockayne - ON PUMP CORONARY ARTERY BYPASS GRAFT X 2 WITH LEFT INTERNAL MAMMARY ARTERY AND GREATER LEFT SAPHENOUS VEIN GRAFTING AND EVH        Subjective:   Pt seen with Dr. Sravani López. On vent overnight for hemodynamics. Awake & overbreathing vent this am. Responding appropriately. IABP 1:2. Michael at 40 mcg/min. Rhythm SB 60, A-paced for BP support. CT output decreased. Objective:   Vitals:  Blood pressure (!) 80/44, pulse 80, temperature (!) 100.5 °F (38.1 °C), resp. rate 21, height 5' 1\" (1.549 m), weight 139 lb (63 kg), SpO2 100 %. Temp (24hrs), Av.7 °F (36.5 °C), Min:96.4 °F (35.8 °C), Max:100.5 °F (38.1 °C)    Hemodynamics:   CO: CO (l/min): 4.2 l/min   CI: CI (l/min/m2): 2.6 l/min/m2   CVP: CVP (mmHg): 16 mmHg (21)   SVR: SVR (dyne*sec)/cm5: 1000 (dyne*sec)/cm5 (21 1200)   PAP Systolic: PAP Systolic: 37 (88/22/13 1765)   PAP Diastolic: PAP Diastolic: 22 (54/87/23 0860)   PVR:     SV02: SVO2 (%): 81 % (21)   SCV02:      EKG/Rhythm:  SB 60, A-paced 80    Extubation Date / Time: n/a    CT Output: 470 cc since OR, 270 cc overnight (serosang)    Ventilator:  Ventilator Volumes  Vt Set (ml): 350 ml (21)  Vt Exhaled (Machine Breath) (ml): 452 ml (21)  Ve Observed (l/min): 10 l/min (21)    Oxygen Therapy:  Oxygen Therapy  O2 Sat (%): 100 % (21)  Pulse via Oximetry: 80 beats per minute (21)  O2 Device: Nasal cannula (21)  O2 Flow Rate (L/min): 2 l/min (21)  O2 Temperature:  (HME) (21 1549)  FIO2 (%): 40 % (21)    CXR:  CXR Results  (Last 48 hours)               21 0440  XR CHEST PORT Final result    Impression:  No significant change. Narrative:  INDICATION:  postop heart       EXAM: CXR Portable.        FINDINGS: Portable chest shows support lines/devices show no significant change   since yesterday. IABP appears stable and satisfactory, with tip located several   centimeters below the aortic arch. There is no apparent pneumothorax. Lungs   show no acute findings. Heart size is stable. There is no pulmonary edema. 08/01/21 1615  XR CHEST PORT Final result    Impression:  Lines and tubes as described. No acute process       Narrative:  EXAM: XR CHEST PORT       INDICATION: postop heart       COMPARISON: 0945 hours       FINDINGS: A portable AP radiograph of the chest was obtained at 1550 hours. The   patient is on a cardiac monitor. The NGT terminates below the thoracic inlet. The CTs terminate over the left thorax and mediastinum. The SGC terminates at   the caval atrial junction. 08/01/21 0958  XR CHEST PORT Final result    Impression:      No acute process. Narrative:  EXAM:  XR CHEST PORT       INDICATION:  chest pain       COMPARISON:  None. FINDINGS:       A portable AP radiograph of the chest was obtained at 9:45 hours. The lungs are   clear. The cardiac and mediastinal contours and pulmonary vascularity are   normal.  The bones and soft tissues are unremarkable. Admission Weight: Last Weight   Weight: 139 lb (63 kg) Weight: 139 lb (63 kg)     Intake / Output / Drain:  Current Shift: 08/02 0701 - 08/02 1900  In: -   Out: 30 [Urine:20; Drains:10]  Last 24 hrs.:     Intake/Output Summary (Last 24 hours) at 8/2/2021 0933  Last data filed at 8/2/2021 0755  Gross per 24 hour   Intake 3371.48 ml   Output 2990 ml   Net 381.48 ml       EXAM:  General:    Awake & alert on vent                                                                                          Lungs:   Clear to auscultation bilaterally. Incision:  Sternal dressing CDI   Heart:  A-paced @ 80 with underlying SB @ 60 S1, S2 normal, no murmur, rub or gallop. Abdomen:   Soft, non-tender.  Bowel sounds hypoactive   Extremities:  No edema. Rt femoral IABP with no hematoma or bleeding. Distal Dp pulses faintly palpable. Neurologic: Awake & alert. Moves all extremities. Labs:   Recent Labs     21  0831 21  0438 21  0409 21  1645 21  1600   WBC  --   --  10.7   < > 17.5*   HGB  --   --  7.5*   < > 8.6*   HCT  --   --  23.0*   < > 26.2*   PLT  --   --  154   < > 156   NA  --   --  144   < > 144   K  --   --  4.0   < > 4.0   BUN  --   --  11   < > 12   CREA  --   --  0.77   < > 0.84   GLU  --   --  79   < > 124*   GLUCPOC 110   < >  --    < >  --    INR  --   --   --   --  1.2*    < > = values in this interval not displayed. Assessment:     Active Problems:    CAD (coronary artery disease) (2021)      S/P CABG x 2 (2021)      Overview: ON PUMP CORONARY ARTERY BYPASS GRAFT X 2 WITH LEFT INTERNAL MAMMARY ARTERY       to LAD, RSVG to OM      GREATER LEFT SAPHENOUS VEIN EVH         Plan/Recommendations/Medical Decision Makin. CAD, NSTEMI. POD 1 emergent CABG x2, IABP in cath lab for angina. Wean vent / IABP / Michael as tolerated. ASA, Amiodarone, Statin. Toradol, Gabapentin for pain. Add BB when BP & HR allow. 2. Post-op blood loss anemia: trend labs, CT output  3. HLD: statin  4. Hypothyroidism: continue Levothyroxine.  Check TSH    Dispo: keep in ICU today    Signed By: Geena Jones PA-C

## 2021-08-02 NOTE — DIABETES MGMT
[address] [facility]    CLINICAL NURSE SPECIALIST CONSULT     Initial Presentation   Alayna Vizcarra is a 63 y.o. female admitted 8/1/21 with complaints of mid-epigastric pain radiating to the jaw, associated with dyspnea. No known cardiac history. Afebrile. Normotensive. 02 sats 97%   LAB: WBC 11.4. Serum creatinine 0.91/GFR >60. AST 25/ALT 28. Troponin +  CXR: Negative      HX: History reviewed. No pertinent past medical history. INITIAL DX: CAD. STEMI    Treatment plan     TX: 8/1/21 LHC: 1- Severe left main and 3 vessel CAD. 2- Mildly depressed LV systolic fxn with inf HK. 3- Normal LVEDP =>   LIEN AND EPIAORTIC ULTRASOUND BY DR Octavio Venegas - ON PUMP CORONARY ARTERY BYPASS GRAFT X 2 WITH LEFT INTERNAL MAMMARY ARTERY AND GREATER LEFT SAPHENOUS VEIN GRAFTING AND EVH     Amio. Anti-lipid. BP & pain management. ABx. GI protection. Hospital course   Clinical progress has been uncomplicated. 8/1/21 ECHO:  Post intervention followup study after coronary artery bypass grafting of two vessels on cardiopulmonary bypass, the left ventricular ejection fraction was 55%. There was improved inferior wall motion. The right ventricular function was preserved. There were no changes in valvular function. There were no effusions and the patient's ascending aorta was intact. The intra-aortic balloon pump position was unchanged  8/2/21 Plans to extubate and remove IABP this morning. Receiving albumin. CBC will be checked later today to determine if blood transfusion is needed. CT & Lopez in place     CXR: IABP appears stable and satisfactory, with tip located several centimeters below the aortic arch. There is no apparent pneumothorax. Lungs show no acute findings. Heart size is stable. There is no pulmonary edema. Diabetes    Patient did not have diabetes PTA. Family history UNKNOWN for diabetes. Admission ; no A1c available.     Consulted by Provider for advanced diabetes nursing assessment and care, specifically related to   [x] Transitioning off Violette Gaw   [x] Inpatient management strategy    Subjective   Intubated at the time of this exchange     Objective   Physical exam  General Normal weight female. Ill-appearing  Neuro  Alert  Vital Signs Febrile. Paced rhythm. Hypotensive on michael infusion  Visit Vitals  BP (!) 80/44   Pulse 80   Temp (!) 100.5 °F (38.1 °C)   Resp 21   Ht 5' 1\" (1.549 m)   Wt 63 kg (139 lb)   SpO2 100%   BMI 26.26 kg/m²     Extremities No foot wounds    Diabetic foot exam:    Left Foot     Visual Exam: normal    Pulse DP: 1+ (weak)  Right Foot   Visual Exam: normal    Pulse DP: 1+ (weak)    Laboratory  Tests Today 8/1/21   A1c     BG 79 112   Anion gap 6 8   Serum triglycerides     WBC 10.7 11.4   Serum creatinine 0.77 0.91   GFR >60 >60    25   ALT 30 28   Troponin  Pos     Factors impacting BG management  Factor Dose Comments   Nutrition:  NPO      Drugs:  Vasopressor load   Michael infusion   Affects insulin delivery   Pain: Surgical Scheduled Toradol & Neurontin  MS prn    Infection Ancef X6 doses Febrile. WBC     Blood glucose pattern        Assessment and Plan   Nursing Diagnosis Risk for unstable blood glucose pattern   Nursing Intervention Domain 2192 Decision-making Support   Nursing Interventions Examined current inpatient diabetes control   Explored factors facilitating and impeding inpatient management     Evaluation   This normal weight  female without diabetes was admitted & found to have significant coronary vascular disease. Underwent emergent CABG 8/1/21. With regard to BG control, patient has easily achieved BG control via Glucostabilizer. Used 9 units of insulin yesterday (8/1/21). Will follow along for now.     Recommendations     [x] Glucostabilizer X48 hrs (per protocol)    Billing Code(s)     [x] 22294 IP subsequent hospital care - 25 minutes    Before making these care recommendations, I personally reviewed the hospitalization record, including notes, laboratory & diagnostic data and current medications, and examined the patient at the bedside (circumstances permitting) before making care recommendations.      Total minutes: [address] [place], CNS  Diabetes Clinical Nurse Specialist  [facility]  Access via [facility]

## 2021-08-03 ENCOUNTER — APPOINTMENT (OUTPATIENT)
Dept: GENERAL RADIOLOGY | Age: 66
DRG: 234 | End: 2021-08-03
Attending: PHYSICIAN ASSISTANT
Payer: COMMERCIAL

## 2021-08-03 LAB
ADMINISTERED INITIALS, ADMINIT: NORMAL
ALBUMIN SERPL-MCNC: 3.4 G/DL (ref 3.5–5)
ALBUMIN/GLOB SERPL: 1.4 {RATIO} (ref 1.1–2.2)
ALP SERPL-CCNC: 41 U/L (ref 45–117)
ALT SERPL-CCNC: 24 U/L (ref 12–78)
ANION GAP SERPL CALC-SCNC: 8 MMOL/L (ref 5–15)
AST SERPL-CCNC: 90 U/L (ref 15–37)
BILIRUB SERPL-MCNC: 0.4 MG/DL (ref 0.2–1)
BUN SERPL-MCNC: 14 MG/DL (ref 6–20)
BUN/CREAT SERPL: 16 (ref 12–20)
CALCIUM SERPL-MCNC: 7.9 MG/DL (ref 8.5–10.1)
CHLORIDE SERPL-SCNC: 109 MMOL/L (ref 97–108)
CO2 SERPL-SCNC: 25 MMOL/L (ref 21–32)
CREAT SERPL-MCNC: 0.9 MG/DL (ref 0.55–1.02)
D50 ADMINISTERED, D50ADM: 0 ML
D50 ORDER, D50ORD: 0 ML
ERYTHROCYTE [DISTWIDTH] IN BLOOD BY AUTOMATED COUNT: 14.1 % (ref 11.5–14.5)
GLOBULIN SER CALC-MCNC: 2.4 G/DL (ref 2–4)
GLSCOM COMMENTS: NORMAL
GLUCOSE BLD STRIP.AUTO-MCNC: 108 MG/DL (ref 65–117)
GLUCOSE BLD STRIP.AUTO-MCNC: 108 MG/DL (ref 65–117)
GLUCOSE BLD STRIP.AUTO-MCNC: 112 MG/DL (ref 65–117)
GLUCOSE BLD STRIP.AUTO-MCNC: 115 MG/DL (ref 65–117)
GLUCOSE BLD STRIP.AUTO-MCNC: 116 MG/DL (ref 65–117)
GLUCOSE BLD STRIP.AUTO-MCNC: 116 MG/DL (ref 65–117)
GLUCOSE BLD STRIP.AUTO-MCNC: 118 MG/DL (ref 65–117)
GLUCOSE BLD STRIP.AUTO-MCNC: 119 MG/DL (ref 65–117)
GLUCOSE BLD STRIP.AUTO-MCNC: 129 MG/DL (ref 65–117)
GLUCOSE BLD STRIP.AUTO-MCNC: 146 MG/DL (ref 65–117)
GLUCOSE BLD STRIP.AUTO-MCNC: 156 MG/DL (ref 65–117)
GLUCOSE SERPL-MCNC: 106 MG/DL (ref 65–100)
GLUCOSE, GLC: 108 MG/DL
GLUCOSE, GLC: 108 MG/DL
GLUCOSE, GLC: 112 MG/DL
GLUCOSE, GLC: 115 MG/DL
GLUCOSE, GLC: 116 MG/DL
GLUCOSE, GLC: 118 MG/DL
GLUCOSE, GLC: 119 MG/DL
GLUCOSE, GLC: 129 MG/DL
GLUCOSE, GLC: 129 MG/DL
GLUCOSE, GLC: 146 MG/DL
GLUCOSE, GLC: 156 MG/DL
HCT VFR BLD AUTO: 27.1 % (ref 35–47)
HGB BLD-MCNC: 8.9 G/DL (ref 11.5–16)
HIGH TARGET, HITG: 130 MG/DL
INSULIN ADMINSTERED, INSADM: 0.5 UNITS/HOUR
INSULIN ADMINSTERED, INSADM: 0.6 UNITS/HOUR
INSULIN ADMINSTERED, INSADM: 0.7 UNITS/HOUR
INSULIN ADMINSTERED, INSADM: 0.7 UNITS/HOUR
INSULIN ADMINSTERED, INSADM: 1 UNITS/HOUR
INSULIN ADMINSTERED, INSADM: 1.7 UNITS/HOUR
INSULIN ORDER, INSORD: 0.5 UNITS/HOUR
INSULIN ORDER, INSORD: 0.6 UNITS/HOUR
INSULIN ORDER, INSORD: 0.7 UNITS/HOUR
INSULIN ORDER, INSORD: 0.7 UNITS/HOUR
INSULIN ORDER, INSORD: 1 UNITS/HOUR
INSULIN ORDER, INSORD: 1.7 UNITS/HOUR
LOW TARGET, LOT: 95 MG/DL
MAGNESIUM SERPL-MCNC: 2.3 MG/DL (ref 1.6–2.4)
MCH RBC QN AUTO: 31.2 PG (ref 26–34)
MCHC RBC AUTO-ENTMCNC: 32.8 G/DL (ref 30–36.5)
MCV RBC AUTO: 95.1 FL (ref 80–99)
MINUTES UNTIL NEXT BG, NBG: 120 MIN
MINUTES UNTIL NEXT BG, NBG: 60 MIN
MULTIPLIER, MUL: 0.01
MULTIPLIER, MUL: 0.02
NRBC # BLD: 0 K/UL (ref 0–0.01)
NRBC BLD-RTO: 0 PER 100 WBC
ORDER INITIALS, ORDINIT: NORMAL
PLATELET # BLD AUTO: 128 K/UL (ref 150–400)
PMV BLD AUTO: 11.3 FL (ref 8.9–12.9)
POTASSIUM SERPL-SCNC: 3.7 MMOL/L (ref 3.5–5.1)
PROT SERPL-MCNC: 5.8 G/DL (ref 6.4–8.2)
RBC # BLD AUTO: 2.85 M/UL (ref 3.8–5.2)
SERVICE CMNT-IMP: ABNORMAL
SERVICE CMNT-IMP: NORMAL
SODIUM SERPL-SCNC: 142 MMOL/L (ref 136–145)
TSH SERPL DL<=0.05 MIU/L-ACNC: 0.21 UIU/ML (ref 0.36–3.74)
WBC # BLD AUTO: 12.4 K/UL (ref 3.6–11)

## 2021-08-03 PROCEDURE — 74011250637 HC RX REV CODE- 250/637: Performed by: PHYSICIAN ASSISTANT

## 2021-08-03 PROCEDURE — 74011250637 HC RX REV CODE- 250/637: Performed by: THORACIC SURGERY (CARDIOTHORACIC VASCULAR SURGERY)

## 2021-08-03 PROCEDURE — 74011250636 HC RX REV CODE- 250/636: Performed by: PHYSICIAN ASSISTANT

## 2021-08-03 PROCEDURE — P9045 ALBUMIN (HUMAN), 5%, 250 ML: HCPCS | Performed by: PHYSICIAN ASSISTANT

## 2021-08-03 PROCEDURE — 97535 SELF CARE MNGMENT TRAINING: CPT

## 2021-08-03 PROCEDURE — 74011000258 HC RX REV CODE- 258: Performed by: PHYSICIAN ASSISTANT

## 2021-08-03 PROCEDURE — 97530 THERAPEUTIC ACTIVITIES: CPT

## 2021-08-03 PROCEDURE — 82962 GLUCOSE BLOOD TEST: CPT

## 2021-08-03 PROCEDURE — 74011250636 HC RX REV CODE- 250/636: Performed by: NURSE PRACTITIONER

## 2021-08-03 PROCEDURE — 74011250636 HC RX REV CODE- 250/636: Performed by: THORACIC SURGERY (CARDIOTHORACIC VASCULAR SURGERY)

## 2021-08-03 PROCEDURE — 97162 PT EVAL MOD COMPLEX 30 MIN: CPT

## 2021-08-03 PROCEDURE — 71045 X-RAY EXAM CHEST 1 VIEW: CPT

## 2021-08-03 PROCEDURE — 80053 COMPREHEN METABOLIC PANEL: CPT

## 2021-08-03 PROCEDURE — 77010033678 HC OXYGEN DAILY

## 2021-08-03 PROCEDURE — 99232 SBSQ HOSP IP/OBS MODERATE 35: CPT | Performed by: CLINICAL NURSE SPECIALIST

## 2021-08-03 PROCEDURE — 85027 COMPLETE CBC AUTOMATED: CPT

## 2021-08-03 PROCEDURE — 97116 GAIT TRAINING THERAPY: CPT

## 2021-08-03 PROCEDURE — 36415 COLL VENOUS BLD VENIPUNCTURE: CPT

## 2021-08-03 PROCEDURE — 74011000250 HC RX REV CODE- 250: Performed by: PHYSICIAN ASSISTANT

## 2021-08-03 PROCEDURE — APPNB60 APP NON BILLABLE TIME 46-60 MINS: Performed by: PHYSICIAN ASSISTANT

## 2021-08-03 PROCEDURE — 65620000000 HC RM CCU GENERAL

## 2021-08-03 PROCEDURE — P9045 ALBUMIN (HUMAN), 5%, 250 ML: HCPCS | Performed by: NURSE PRACTITIONER

## 2021-08-03 PROCEDURE — 74011636637 HC RX REV CODE- 636/637: Performed by: THORACIC SURGERY (CARDIOTHORACIC VASCULAR SURGERY)

## 2021-08-03 PROCEDURE — 84443 ASSAY THYROID STIM HORMONE: CPT

## 2021-08-03 PROCEDURE — 74011000250 HC RX REV CODE- 250: Performed by: THORACIC SURGERY (CARDIOTHORACIC VASCULAR SURGERY)

## 2021-08-03 PROCEDURE — 97165 OT EVAL LOW COMPLEX 30 MIN: CPT

## 2021-08-03 PROCEDURE — 83735 ASSAY OF MAGNESIUM: CPT

## 2021-08-03 RX ORDER — INSULIN GLARGINE 100 [IU]/ML
4 INJECTION, SOLUTION SUBCUTANEOUS ONCE
Status: COMPLETED | OUTPATIENT
Start: 2021-08-03 | End: 2021-08-03

## 2021-08-03 RX ORDER — ALBUMIN HUMAN 50 G/1000ML
12.5 SOLUTION INTRAVENOUS ONCE
Status: COMPLETED | OUTPATIENT
Start: 2021-08-03 | End: 2021-08-03

## 2021-08-03 RX ORDER — PANTOPRAZOLE SODIUM 40 MG/1
40 TABLET, DELAYED RELEASE ORAL
Status: DISCONTINUED | OUTPATIENT
Start: 2021-08-04 | End: 2021-08-06 | Stop reason: HOSPADM

## 2021-08-03 RX ORDER — CLOPIDOGREL BISULFATE 75 MG/1
75 TABLET ORAL DAILY
Status: DISCONTINUED | OUTPATIENT
Start: 2021-08-03 | End: 2021-08-03

## 2021-08-03 RX ORDER — POTASSIUM CHLORIDE 29.8 MG/ML
20 INJECTION INTRAVENOUS
Status: COMPLETED | OUTPATIENT
Start: 2021-08-03 | End: 2021-08-03

## 2021-08-03 RX ORDER — ENOXAPARIN SODIUM 100 MG/ML
40 INJECTION SUBCUTANEOUS EVERY 24 HOURS
Status: DISCONTINUED | OUTPATIENT
Start: 2021-08-03 | End: 2021-08-06 | Stop reason: HOSPADM

## 2021-08-03 RX ORDER — GABAPENTIN 300 MG/1
300 CAPSULE ORAL 3 TIMES DAILY
Status: DISCONTINUED | OUTPATIENT
Start: 2021-08-03 | End: 2021-08-06 | Stop reason: HOSPADM

## 2021-08-03 RX ORDER — CLOPIDOGREL BISULFATE 75 MG/1
75 TABLET ORAL DAILY
Status: DISCONTINUED | OUTPATIENT
Start: 2021-08-04 | End: 2021-08-06 | Stop reason: HOSPADM

## 2021-08-03 RX ADMIN — MAGNESIUM OXIDE TAB 400 MG (241.3 MG ELEMENTAL MG) 400 MG: 400 (241.3 MG) TAB at 08:32

## 2021-08-03 RX ADMIN — Medication 10 ML: at 21:38

## 2021-08-03 RX ADMIN — KETOROLAC TROMETHAMINE 15 MG: 30 INJECTION, SOLUTION INTRAMUSCULAR; INTRAVENOUS at 12:19

## 2021-08-03 RX ADMIN — POTASSIUM CHLORIDE 20 MEQ: 400 INJECTION, SOLUTION INTRAVENOUS at 07:57

## 2021-08-03 RX ADMIN — ASPIRIN 81 MG: 81 TABLET, CHEWABLE ORAL at 08:32

## 2021-08-03 RX ADMIN — NOREPINEPHRINE BITARTRATE 3 MCG/MIN: 1 INJECTION, SOLUTION, CONCENTRATE INTRAVENOUS at 14:10

## 2021-08-03 RX ADMIN — CALCIUM CHLORIDE 1 G: 100 INJECTION, SOLUTION INTRAVENOUS at 08:49

## 2021-08-03 RX ADMIN — AMIODARONE HYDROCHLORIDE 400 MG: 200 TABLET ORAL at 08:32

## 2021-08-03 RX ADMIN — ALBUMIN (HUMAN) 12.5 G: 12.5 INJECTION, SOLUTION INTRAVENOUS at 08:34

## 2021-08-03 RX ADMIN — GABAPENTIN 300 MG: 300 CAPSULE ORAL at 08:32

## 2021-08-03 RX ADMIN — ONDANSETRON 4 MG: 2 INJECTION INTRAMUSCULAR; INTRAVENOUS at 03:05

## 2021-08-03 RX ADMIN — OXYCODONE 5 MG: 5 TABLET ORAL at 08:32

## 2021-08-03 RX ADMIN — INSULIN GLARGINE 4 UNITS: 100 INJECTION, SOLUTION SUBCUTANEOUS at 14:15

## 2021-08-03 RX ADMIN — ATORVASTATIN CALCIUM 40 MG: 40 TABLET, FILM COATED ORAL at 08:32

## 2021-08-03 RX ADMIN — ONDANSETRON 4 MG: 2 INJECTION INTRAMUSCULAR; INTRAVENOUS at 08:32

## 2021-08-03 RX ADMIN — ENOXAPARIN SODIUM 40 MG: 40 INJECTION SUBCUTANEOUS at 08:33

## 2021-08-03 RX ADMIN — Medication 10 ML: at 13:02

## 2021-08-03 RX ADMIN — MUPIROCIN: 20 OINTMENT TOPICAL at 08:33

## 2021-08-03 RX ADMIN — MAGNESIUM OXIDE TAB 400 MG (241.3 MG ELEMENTAL MG) 400 MG: 400 (241.3 MG) TAB at 17:23

## 2021-08-03 RX ADMIN — CHLORHEXIDINE GLUCONATE 0.12% ORAL RINSE 10 ML: 1.2 LIQUID ORAL at 08:33

## 2021-08-03 RX ADMIN — ONDANSETRON 4 MG: 2 INJECTION INTRAMUSCULAR; INTRAVENOUS at 17:25

## 2021-08-03 RX ADMIN — KETOROLAC TROMETHAMINE 15 MG: 30 INJECTION, SOLUTION INTRAMUSCULAR; INTRAVENOUS at 05:56

## 2021-08-03 RX ADMIN — POLYETHYLENE GLYCOL 3350 17 G: 17 POWDER, FOR SOLUTION ORAL at 08:33

## 2021-08-03 RX ADMIN — CHLORHEXIDINE GLUCONATE 0.12% ORAL RINSE 10 ML: 1.2 LIQUID ORAL at 21:37

## 2021-08-03 RX ADMIN — OXYCODONE 5 MG: 5 TABLET ORAL at 17:23

## 2021-08-03 RX ADMIN — GABAPENTIN 300 MG: 300 CAPSULE ORAL at 14:15

## 2021-08-03 RX ADMIN — OXYCODONE 5 MG: 5 TABLET ORAL at 03:03

## 2021-08-03 RX ADMIN — Medication 10 ML: at 05:56

## 2021-08-03 RX ADMIN — FAMOTIDINE 20 MG: 20 TABLET ORAL at 06:32

## 2021-08-03 RX ADMIN — DOCUSATE SODIUM AND SENNOSIDES 1 TABLET: 8.6; 5 TABLET, FILM COATED ORAL at 17:23

## 2021-08-03 RX ADMIN — GABAPENTIN 300 MG: 300 CAPSULE ORAL at 21:36

## 2021-08-03 RX ADMIN — POTASSIUM CHLORIDE 20 MEQ: 400 INJECTION, SOLUTION INTRAVENOUS at 08:44

## 2021-08-03 RX ADMIN — CEFAZOLIN 2 G: 1 INJECTION, POWDER, FOR SOLUTION INTRAMUSCULAR; INTRAVENOUS at 04:40

## 2021-08-03 RX ADMIN — MUPIROCIN: 20 OINTMENT TOPICAL at 21:36

## 2021-08-03 RX ADMIN — LEVOTHYROXINE SODIUM 100 MCG: 0.1 TABLET ORAL at 05:56

## 2021-08-03 RX ADMIN — DOCUSATE SODIUM AND SENNOSIDES 1 TABLET: 8.6; 5 TABLET, FILM COATED ORAL at 08:32

## 2021-08-03 RX ADMIN — ALBUMIN (HUMAN) 12.5 G: 12.5 INJECTION, SOLUTION INTRAVENOUS at 16:45

## 2021-08-03 RX ADMIN — AMIODARONE HYDROCHLORIDE 400 MG: 200 TABLET ORAL at 21:36

## 2021-08-03 NOTE — DIABETES MGMT
[address] [facility]    CLINICAL NURSE SPECIALIST CONSULT     Initial Presentation   Earlene Pittman is a 63 y.o. female admitted 8/1/21 with complaints of mid-epigastric pain radiating to the jaw, associated with dyspnea. No known cardiac history. Afebrile. Normotensive. 02 sats 97%   LAB: WBC 11.4. Serum creatinine 0.91/GFR >60. AST 25/ALT 28. Troponin +  CXR: Negative    HX: History reviewed. No pertinent past medical history. INITIAL DX: CAD. STEMI    Treatment plan     TX: 8/1/21 LHC: 1- Severe left main and 3 vessel CAD. 2- Mildly depressed LV systolic fxn with inf HK. 3- Normal LVEDP =>   LIEN AND EPIAORTIC ULTRASOUND BY DR Armand Thompson - ON PUMP CORONARY ARTERY BYPASS GRAFT X 2 WITH LEFT INTERNAL MAMMARY ARTERY AND GREATER LEFT SAPHENOUS VEIN GRAFTING AND EVH     Amio. Anti-lipid. BP & pain management. Clot prevention GI protection. Hospital course   Clinical progress has been uncomplicated. 8/1/21 ECHO:  Post intervention followup study after coronary artery bypass grafting of two vessels on cardiopulmonary bypass, the left ventricular ejection fraction was 55%. There was improved inferior wall motion. The right ventricular function was preserved. There were no changes in valvular function. There were no effusions and the patient's ascending aorta was intact. The intra-aortic balloon pump position was unchanged  8/2/21 Plans to extubate and remove IABP this morning. Receiving albumin. CBC will be checked later today to determine if blood transfusion is needed. CT & Lopez in place     CXR: IABP appears stable and satisfactory, with tip located several centimeters below the aortic arch. There is no apparent pneumothorax. Lungs show no acute findings. Heart size is stable. There is no pulmonary edema. PRBCs +.  8/3/21 02NC. Chest tubes out. CXR: New left base opacity likely reflects atelectasis status post extubation. Diabetes    Patient did not have diabetes PTA.    Family history UNKNOWN for diabetes. Admission ; no A1c available. Consulted by Provider for advanced diabetes nursing assessment and care, specifically related to   [x] Transitioning off Wilene Pen   [x] Inpatient management strategy    Subjective   \"Ok. Pacheco Rich Pacheco Rich \"     Objective   Physical exam  General Normal weight female. In no acute distress  Neuro  Alert & oriented. Vital Signs Afebrile. SR. Normotensive on levo infusion  Visit Vitals  BP (!) 109/46   Pulse 90   Temp 98.5 °F (36.9 °C)   Resp 24   Ht 5' 1\" (1.549 m)   Wt 63 kg (139 lb)   SpO2 99%   BMI 26.26 kg/m²     Extremities No foot wounds    Diabetic foot exam:    Left Foot     Visual Exam: normal    Pulse DP: 1+ (weak)  Right Foot   Visual Exam: normal    Pulse DP: 1+ (weak)    Laboratory  Tests 8/3 8/2 8/1/21   A1c       79 112   Anion gap 8 6 8   Serum triglycerides      WBC 12.4 10.7 11.4   Serum creatinine 0.9 0.77 0.91   GFR >60 >60 >60   AST 90 130 25   ALT 24 30 28   Troponin   Pos   TSH 0.21       Factors impacting BG management  Factor Dose Comments   Nutrition:  CL    Nothing documented   Drugs:  Vasopressor load   Michael infusion   Affects insulin delivery   Pain: Surgical Scheduled Toradol & Neurontin  MS prn Rated 4-5   Infection Ancef X6 doses Afebrile. WBC 12.4     Blood glucose pattern        Assessment and Plan   Nursing Diagnosis Risk for unstable blood glucose pattern   Nursing Intervention Domain 3297 Decision-making Support   Nursing Interventions Examined current inpatient diabetes control   Explored factors facilitating and impeding inpatient management     Evaluation   This normal weight  female without diabetes was admitted & found to have significant coronary vascular disease. Underwent emergent CABG 8/1/21. With regard to BG control, patient has easily achieved BG control via Glucostabilizer. Used 9 units of insulin the day of surgery (8/1/21), 21.6 units 8/2/21 and 7.3 units so far today.  Should transition off later this afternoon. Would use usual protocol. Will monitor for 24 hours post transition to determine if additional medication will be required. Recommendations     [x] Transition off Flaquito Galla this afternoon per protocol    Billing Code(s)     [x] 57183 IP subsequent hospital care - 25 minutes    Before making these care recommendations, I personally reviewed the hospitalization record, including notes, laboratory & diagnostic data and current medications, and examined the patient at the bedside (circumstances permitting) before making care recommendations.      Total minutes: Nam Liao, CNS  Diabetes Clinical Nurse Specialist  [facility]  Access via [address] [place] [address]

## 2021-08-03 NOTE — PROGRESS NOTES
Problem: Self Care Deficits Care Plan (Adult)  Goal: *Acute Goals and Plan of Care (Insert Text)  Description: FUNCTIONAL STATUS PRIOR TO ADMISSION: Patient was independent and active without use of DME.     HOME SUPPORT: The patient lived with family but did not require assist.    Occupational Therapy Goals  Initiated 8/3/2021  1. Patient will perform ADLs standing 5 mins without fatigue or LOB with supervision/set-up within 7 day(s). 2.  Patient will perform lower body ADLs with supervision/set-up within 7 day(s). 3.  Patient will perform gathering ADL items high and low 2/2 with supervision/set-up within 7 day(s). 4.  Patient will perform toilet transfers with supervision/set-up within 7 day(s). 5.  Patient will perform all aspects of toileting with supervision/set-up within 7 day(s). 6.  Patient will participate in cardiac/sternal upper extremity therapeutic exercise/activities to increase independence with ADLs with supervision/set-up for 5 minutes within 7 day(s). Outcome: Not Met   OCCUPATIONAL THERAPY EVALUATION  Patient: Abiola Nunn (73 y.o. female)  Date: 8/3/2021  Primary Diagnosis: CAD (coronary artery disease) [I25.10]  Procedure(s) (LRB):  LIEN AND EPIAORTIC ULTRASOUND BY DR Haley Dumont - ON PUMP CORONARY ARTERY BYPASS GRAFT X 2 WITH LEFT INTERNAL MAMMARY ARTERY AND GREATER LEFT SAPHENOUS VEIN GRAFTING AND EVH (N/A) 2 Days Post-Op   Precautions:   Sternal (move in the tube)    ASSESSMENT  Based on the objective data described below, the patient presents with decreased endurance, strength, functional mobility, ADLs and pain. Pt was living at home with family and independent with ADLs and did not use AD. She reports that she has been more sedentary lately. Pt was educated on Move in the tube, and sternal precautions. She was able to perform her cardiac ex sitting and she did well with all ex and CGA to min for right UE due to old shoulder injury.   Pt was able to stand with CGA and walked with cardiac cart on 2 liters of NC and all VSS. Pt was moving well and asked to remain in chair at end of session. Pt is progressing and recommend that she return home with family at discharge . Princess Oliver Current Level of Function Impacting Discharge (ADLs/self-care): max for ADLs    Functional Outcome Measure: The patient scored 50/100 on the Barthel outcome measure which is indicative of  max for ADLS. Patient will benefit from skilled therapy intervention to address the above noted impairments. PLAN :  Recommendations and Planned Interventions: self care training, functional mobility training, therapeutic exercise, balance training, therapeutic activities, endurance activities, patient education, and home safety training    Frequency/Duration: Patient will be followed by occupational therapy 5 times a week to address goals. Recommendation for discharge: (in order for the patient to meet his/her long term goals)  No skilled occupational therapy/ follow up rehabilitation needs identified at this time. This discharge recommendation:  Has been made in collaboration with the attending provider and/or case management    IF patient discharges home will need the following DME: tbd       SUBJECTIVE:   Patient stated I am stiff. Those exercises feel good.     OBJECTIVE DATA SUMMARY:   HISTORY:   History reviewed. No pertinent past medical history. History reviewed. No pertinent surgical history.     Expanded or extensive additional review of patient history:     Home Situation  Home Environment: Private residence  # Steps to Enter: 4  Rails to Enter: Yes  Hand Rails : Left  One/Two Story Residence: One story  Living Alone: No  Support Systems: Spouse/Significant Other/Partner  Patient Expects to be Discharged to[de-identified] House  Current DME Used/Available at Home: None  Tub or Shower Type: Shower    Hand dominance: Right    EXAMINATION OF PERFORMANCE DEFICITS:  Cognitive/Behavioral Status:  Neurologic State: Alert  Orientation Level: Oriented X4  Cognition: Follows commands  Perception: Appears intact  Perseveration: No perseveration noted  Safety/Judgement: Fall prevention    Skin: in fair health     Edema: none     Hearing: Auditory  Auditory Impairment: None    Vision/Perceptual:                    Intact                  Range of Motion:    AROM: Generally decreased, functional                         Strength:    Strength: Generally decreased, functional                Coordination:  Coordination: Within functional limits  Fine Motor Skills-Upper: Left Intact; Right Intact    Gross Motor Skills-Upper: Left Intact; Right Intact    Tone & Sensation:    Tone: Normal  Sensation: Intact                      Balance:  Sitting: Intact; Without support  Standing: Impaired; With support  Standing - Static: Constant support;Good  Standing - Dynamic : Good    Functional Mobility and Transfers for ADLs:  Bed Mobility:  Rolling:  (sitting in the chair )    Transfers:  Sit to Stand: Contact guard assistance  Stand to Sit: Contact guard assistance  Bed to Chair: Contact guard assistance  Toilet Transfer : Contact guard assistance;Assist x1    ADL Assessment:  Feeding: Independent    Oral Facial Hygiene/Grooming: Setup    Bathing: Maximum assistance;Minimum assistance    Upper Body Dressing: Moderate assistance;Setup    Lower Body Dressing: Maximum assistance; Moderate assistance    Toileting: Moderate assistance;Minimum assistance        Cognitive Retraining  Safety/Judgement: Fall prevention      Therapeutic Exercises:   Patient instructed on the benefits and demonstrated cardiac exercises while sitting with Minimum assistance. Instructed and indicated understanding on how to progress reps, sets against gravity, weight, standing and so on based on cardiologist clearance in prep for basic and instrumental ADLs. Instruction on the use of household items in place of weights as needed.     CARDIAC   EXERCISE    Sets    Reps Active  Active Assist    Passive  Self ROM    Comments    Shoulder flexion  1  5   [x]                            []                             []                             []                                Shoulder abduction  1  5  [x]                             []                             []                             []                                Scapular elevation  1  5  [x]                             []                              []                             []                                Scapular retraction  1  5  [x]                             []                             []                             []                                Trunk rotation  1  5  [x]                             []                             []                             []                                Trunk sidebending  1  5  [x]                             []                              []                             []                                        Functional Measure:  Barthel Index:    Bathin  Bladder: 0  Bowels: 10  Groomin  Dressin  Feeding: 10  Mobility: 10  Stairs: 0  Toilet Use: 5  Transfer (Bed to Chair and Back): 10  Total: 50/100        The Barthel ADL Index: Guidelines  1. The index should be used as a record of what a patient does, not as a record of what a patient could do. 2. The main aim is to establish degree of independence from any help, physical or verbal, however minor and for whatever reason. 3. The need for supervision renders the patient not independent. 4. A patient's performance should be established using the best available evidence. Asking the patient, friends/relatives and nurses are the usual sources, but direct observation and common sense are also important. However direct testing is not needed. 5. Usually the patient's performance over the preceding 24-48 hours is important, but occasionally longer periods will be relevant.   6. Middle categories imply that the patient supplies over 50 per cent of the effort. 7. Use of aids to be independent is allowed. Riley Matias., Barthel, DOsmelW. (0188). Functional evaluation: the Barthel Index. [address] (14)2. BHAVYA Patel, Yue Mays., Ulises Yi., [facility], [facility] (1999). Measuring the change indisability after inpatient rehabilitation; comparison of the responsiveness of the Barthel Index and Functional [place] Measure. Journal of Neurology, Neurosurgery, and Psychiatry, 66(4), 711-505. WAI Barnes.A, STEVEN Olea, & Shasha Herrera M.A. (2004.) Assessment of post-stroke quality of life in cost-effectiveness studies: The usefulness of the Barthel Index and the EuroQoL-5D. Quality of Life Research, 15, 510-20         Occupational Therapy Evaluation Charge Determination   History Examination Decision-Making   MEDIUM Complexity : Expanded review of history including physical, cognitive and psychosocial  history  LOW Complexity : 1-3 performance deficits relating to physical, cognitive , or psychosocial skils that result in activity limitations and / or participation restrictions  LOW Complexity : No comorbidities that affect functional and no verbal or physical assistance needed to complete eval tasks       Based on the above components, the patient evaluation is determined to be of the following complexity level: LOW   Pain Rating:  3    Activity Tolerance:   Fair    After treatment patient left in no apparent distress:    Sitting in chair, Call bell within reach, Bed / chair alarm activated, and Caregiver / family present    COMMUNICATION/EDUCATION:   The patients plan of care was discussed with: Physical therapist and Registered nurse. Patient/family have participated as able in goal setting and plan of care. This patients plan of care is appropriate for delegation to [facility].     Thank you for this referral.  Eva Christianson OT  Time Calculation: 33 mins

## 2021-08-03 NOTE — PROGRESS NOTES
Interdisciplinary team rounds were held 8/3/2021 with the following team members:Care Management, Nursing, Nutrition, Pharmacy and Physician. Plan of care discussed. Goal: remove dumont and chest tubes. Replendish electrolytes. Continue to monitor and support. See MD orders and progress notes for further  interventions and desired outcomes.

## 2021-08-03 NOTE — PROGRESS NOTES
[facility] ICU Progress Note    Admit Date: 2021  POD:  2 Day Post-Op    Procedure:  Procedure(s):  LIEN AND EPIAORTIC ULTRASOUND BY DR Lei David - ON PUMP CORONARY ARTERY BYPASS GRAFT X 2 WITH LEFT INTERNAL MAMMARY ARTERY AND GREATER LEFT SAPHENOUS VEIN GRAFTING AND EVH        Subjective:   Pt seen with Dr. Olga Payne. Extubated, on levo @ 4, insulin. 1 unit PRBC yesterday and IABP removed yesterday. Otherwise no new issues. Objective:   Vitals:  Blood pressure (!) 105/49, pulse 90, temperature 98.5 °F (36.9 °C), resp. rate 23, height 5' 1\" (1.549 m), weight 139 lb (63 kg), SpO2 99 %. Temp (24hrs), Av.6 °F (37.6 °C), Min:98.5 °F (36.9 °C), Max:100.6 °F (38.1 °C)    Hemodynamics:   CO: CO (l/min): 5 l/min   CI: CI (l/min/m2): 3.1 l/min/m2   CVP: CVP (mmHg): 11 mmHg (21)   SVR: SVR (dyne*sec)/cm5: 1031 (dyne*sec)/cm5 (21 9530)   PAP Systolic: PAP Systolic: 43 (/30 7326)   PAP Diastolic: PAP Diastolic: 19 (/17 7018)   PVR:     SV02: SVO2 (%): 91 % (21 1551)   SCV02:      EKG/Rhythm:  SB 60, A-paced 80    Extubation Date / Time: 21 @ 5435    CT Output: 400cc total in 24 hrs (150 cc overnight)    Ventilator:  Ventilator Volumes  Vt Set (ml): 350 ml (21)  Vt Exhaled (Machine Breath) (ml): 452 ml (21)  Ve Observed (l/min): 10 l/min (21)    Oxygen Therapy:  Oxygen Therapy  O2 Sat (%): 99 % (21)  Pulse via Oximetry: 89 beats per minute (08/03/21 0400)  O2 Device: Nasal cannula (21 0400)  O2 Flow Rate (L/min): 2 l/min (21 0400)  O2 Temperature:  (HME) (21 1549)  FIO2 (%): 40 % (21 0727)    CXR:  CXR Results  (Last 48 hours)               21 0441  XR CHEST PORT Final result    Impression:  New left base opacity likely reflects atelectasis status post   extubation. Narrative:  EXAM: XR CHEST PORT       INDICATION: postop heart       COMPARISON: Chest x-ray 2021.        FINDINGS: A portable AP radiograph of the chest was obtained at 04:11 hours. The   patient is on a cardiac monitor. There is a right-sided central venous   introducer catheter in position. Mediastinal and left chest tubes unchanged in   position. Hollywood-Milagros catheter, endotracheal tube, and enteric tube have been   removed. There is new left basilar opacity. There is no pneumothorax. Right lung   is clear. There are median sternotomy wires and surgical clips compatible with   prior CABG. The cardiac and mediastinal contours and pulmonary vascularity are   normal.  The bones and soft tissues are grossly within normal limits. 08/02/21 0440  XR CHEST PORT Final result    Impression:  No significant change. Narrative:  INDICATION:  postop heart       EXAM: CXR Portable. FINDINGS: Portable chest shows support lines/devices show no significant change   since yesterday. IABP appears stable and satisfactory, with tip located several   centimeters below the aortic arch. There is no apparent pneumothorax. Lungs   show no acute findings. Heart size is stable. There is no pulmonary edema. 08/01/21 1615  XR CHEST PORT Final result    Impression:  Lines and tubes as described. No acute process       Narrative:  EXAM: XR CHEST PORT       INDICATION: postop heart       COMPARISON: 0945 hours       FINDINGS: A portable AP radiograph of the chest was obtained at 1550 hours. The   patient is on a cardiac monitor. The NGT terminates below the thoracic inlet. The CTs terminate over the left thorax and mediastinum. The SGC terminates at   the caval atrial junction. 08/01/21 0958  XR CHEST PORT Final result    Impression:      No acute process. Narrative:  EXAM:  XR CHEST PORT       INDICATION:  chest pain       COMPARISON:  None. FINDINGS:       A portable AP radiograph of the chest was obtained at 9:45 hours. The lungs are   clear.   The cardiac and mediastinal contours and pulmonary vascularity are   normal.  The bones and soft tissues are unremarkable. Admission Weight: Last Weight   Weight: 139 lb (63 kg) Weight: 139 lb (63 kg)     Intake / Output / Drain:  Current Shift: No intake/output data recorded. Last 24 hrs.:     Intake/Output Summary (Last 24 hours) at 8/3/2021 0732  Last data filed at 8/3/2021 0700  Gross per 24 hour   Intake 1160.78 ml   Output 1015 ml   Net 145.78 ml       EXAM:  General:    Awake & alert                                                                                         Lungs:   Clear to auscultation bilaterally. Incision:  Sternal dressing CDI   Heart:  A-paced @ 80 with underlying SB @ 60 S1, S2 normal, no murmur, rub or gallop. Abdomen:   Soft, non-tender. Bowel sounds hypoactive   Extremities:  No edema. Rt femoral IABP with no hematoma or bleeding. Distal Dp pulses faintly palpable. Neurologic: Awake & alert. Moves all extremities. Labs:   Recent Labs     21  0606 21  0508 21  0403 21  1645 21  1600   WBC  --  12.4*  --    < > 17.5*   HGB  --  8.9*  --    < > 8.6*   HCT  --  27.1*  --    < > 26.2*   PLT  --  128*  --    < > 156   NA  --  142  --    < > 144   K  --  3.7  --    < > 4.0   BUN  --  14  --    < > 12   CREA  --  0.90  --    < > 0.84   GLU  --  106*  --    < > 124*   GLUCPOC 108  --    < >   < >  --    INR  --   --   --   --  1.2*    < > = values in this interval not displayed. Assessment:     Active Problems:    CAD (coronary artery disease) (2021)      S/P CABG x 2 (2021)      Overview: ON PUMP CORONARY ARTERY BYPASS GRAFT X 2 WITH LEFT INTERNAL MAMMARY ARTERY       to LAD, RSVG to OM      GREATER LEFT SAPHENOUS VEIN EVH         Plan/Recommendations/Medical Decision Makin. CAD, NSTEMI, S/P emergent CABG, EF 50%: IABP removed , cont ASA/high intensity statin. Hold BB while on pressors. Wean levo for MAP >65. Volume repletion. 1 g CaCl today. Will need DAPT due to NSTEMI.  Start plavix tomorrow. 2. Post-op blood loss anemia: monitor HH, chest tube output  3. Acute post op respiratory insufficiency/Atelectasis: extubated yesterday morning. Wean NC as tolerated, TCDB, IS hourly. Progressive ambulation. 4. Post op thrombocytopenia: mild, cont ASA. Switch pepcid to protonix  5. HLD: high intensity statin  6. Hypothyroidism: continue Levothyroxine. 7. GERD: cont protonix  8. Prophylaxis/Nutrition: start SQ lovenox, adv diet as tolerated to heart healthy    Dispo: wean levo after volume/CaCl. Keep in ICU today. D/C dumont, art line.     Signed By: CURT Dumont

## 2021-08-03 NOTE — PROGRESS NOTES
Transition of Care Plan:    RUR:  13%  Disposition:  Home w/ spouse; [place]  Follow up appointments:  Card Surg  DME needed: none  Transportation at Discharge: spouse  Keys or means to access home:  spouse      IM Medicare Letter:  n/a  Is patient a BCPI-A Bundle:    n/a       If yes, was Bundle Letter given?:     Caregiver Contact: Oniel Mcclain  Discharge Caregiver contacted prior to discharge? Reason for Admission:  Pt is a 62 yo female admitted for emergent cath and subsequent CABG. Pt lives w/ her  in a 1-story house and was indep for mobility and ADLs pta to include driving. RUR Score:  13%                   Plan for utilizing home health:   [facility] but did not have a preference - will use any provider in network with her insurance. Ref sent to [facility] via [address] [place] [address] Avenue. PCP: First and Last name:  Otila Paget, MD     Name of Practice:    Are you a current patient: Yes/No:  Yes   Approximate date of last visit:    Can you participate in a virtual visit with your PCP:                     Current Advanced Directive/Advance Care Plan: Full Code      Healthcare Decision Maker:   Click here to complete [address] including selection of the Healthcare Decision Maker Relationship (ie \"Primary\")                             Transition of Care Plan:                     Pt is progressing in CCU per protocol. HH orders are SIgned and Held. Ref sent to [facility]. Insurance info is not appearing on pt's hospital account. CM spoke w/ pt's  who confirmed that they do have BC/BS coverage. He will bring insur card when he visits tonight and ask RN to make copies of card to be placed in bedside chart. Care Management Interventions  PCP Verified by CM: Yes  Palliative Care Criteria Met (RRAT>21 & CHF Dx)?: No  Mode of Transport at Discharge:  Other (see comment)  Transition of Care Consult (CM Consult): Discharge Planning  Discharge Durable Medical Equipment: No  Physical Therapy Consult: Yes  Occupational Therapy Consult: Yes  Speech Therapy Consult: No  Current Support Network: Lives with Spouse  Confirm Follow Up Transport: Family  Discharge Location  Discharge Placement: Home with home health     TOMMIE Gama

## 2021-08-03 NOTE — PROGRESS NOTES
0730- Bedside and Verbal shift change report given to Sonny Bui (oncoming nurse) by Annalisa Swain RN (offgoing nurse). Report included the following information SBAR, Kardex, Intake/Output, Recent Results, Cardiac Rhythm NSR and Alarm Parameters . 0745- Shift assessment completed; see flow sheet for details. Pt A/V/Ox4; drowsy but arousable; able to follow commands. Currently Paced; underlining rhythm, Sinus Rhythm. BP remains labile; remains on levo infusion; will given PRN Albumin. Lungs are clear; diminished in the bases; remains on NC~2LPM. ABD is semi-soft; will advance diet as tolerated. Lopez catheter in place; marginal UOP. Skin is cool and dry. Pt up to the recliner chair without difficulty. 0830- PRN Yesica given for incisional discomfort     1100- Pt's spouse at the bedside; verbal update given. PRN Albumin given to assist with raising pt's BP    1200- Reassessment completed; see flow sheet for details. No acute changes in pt assessment; remains in the recliner chair. Consuming  Lunch meal without difficulty. Lopez catheter removed     1210- Pt up working with PT/OT; ambulated down the nieves and back without difficulty. Returned to the recliner chair once therapy completed. 1430- Weaning Levo as BP tolerates; diastolic BP running low making it hard to achieve a MAP >65; will discuss with CTS team     0- Pt returned to bed with 1 person assist; tolerated transfer well. Insulin gtt completed. Pt voided via BSC    1600- Reassessment completed; see flow sheet for details. No acute changes noted    Discussed with CTS-PA regarding low diastolics; stated will notify MD. Another PRN Albumin given      1730- PRN Yesica given for incisional discomfort & PRN Zofran given for complaint of nausea; spouse at the bedside. René Nayak reports nausea is better; attempting to eat dinner meal     1925- Bedside and Verbal shift change report given to Deena Henson (oncoming nurse) by Sonny Bui (offgoing nurse).  Report included the following information SBAR, Kardex, Intake/Output, Recent Results, Cardiac Rhythm NSR and Alarm Parameters .

## 2021-08-03 NOTE — PROGRESS NOTES
Cardiac Surgery Care Coordinator-  Met with Reanna Rose. Reviewed plan of care and discussed goals for the day. Reanna Rose has a good understanding of her plan for the day. Reinforced sternal precautions and encouraged continued use of the incentive spirometer. Reanna Rose can pull 500ml. Encouraged Reanna Rose to verbalize. Will continue to follow for educational and emotional needs.  Jose Armando Jo RN

## 2021-08-03 NOTE — PROGRESS NOTES
Problem: Mobility Impaired (Adult and Pediatric)  Goal: *Acute Goals and Plan of Care (Insert Text)  Description: FUNCTIONAL STATUS PRIOR TO ADMISSION: Patient was independent and active without use of DME.     HOME SUPPORT PRIOR TO ADMISSION: The patient lived with her . Physical Therapy Goals  Initiated 8/3/2021  1. Patient will move from supine to sit and sit to supine , scoot up and down, and roll side to side in bed with independence within 5 days. 2.  Patient will perform sit to/from stand with independence within 5 days. 3.  Patient will ambulate 250 feet with least restrictive assistive device and independence within 5 days. 4.  Patient will ascend/descend 5 stairs with one sided handrail(s) with modified independence within 5 days. 5.  Patient will perform cardiac exercises per protocol with modified independence within 5 days. 6.  Patient will verbally recall and functionally demonstrate mindful-based movements (\"move in the tube\") principles without cues within 5 days. Outcome: Not Met   PHYSICAL THERAPY EVALUATION  Patient: Kaye Paniagua (59 y.o. female)  Date: 8/3/2021  Primary Diagnosis: CAD (coronary artery disease) [I25.10]  Procedure(s) (LRB):  LIEN AND EPIAORTIC ULTRASOUND BY DR [facility] - ON PUMP CORONARY ARTERY BYPASS GRAFT X 2 WITH LEFT INTERNAL MAMMARY ARTERY AND GREATER LEFT SAPHENOUS VEIN GRAFTING AND EVH (N/A) 2 Days Post-Op   Precautions:   Sternal (move in the tube)      ASSESSMENT  Based on the objective data described below, the patient presents with decreased strength, decreased functional mobility, mildly unsteady gait, and decreased endurance s/p CABG x 2 POD 1. Patient is functioning below her baseline due to post op pain and decreased endurance. Patient required CGA for all mobility. Patient with VSS on 2L O2 with activity. Patient ambulated 150 feet with CGA and rollator, noting slow, shuffled steps.  Anticipate patient will do well with therapy once lines removed and pain improved. Patient was educated on understanding of mindful-based movements (\"move in the tube\") principles of keeping UEs proximal to ribcage to prevent lateral pull on the sternum during load-bearing activities with visual and verbal cues required for compliance. Current Level of Function Impacting Discharge (mobility/balance): CGA with rollator    Functional Outcome Measure: The patient scored 50/100 on the Barthel outcome measure which is indicative of moderate functional impairment. Other factors to consider for discharge: decreased endurance, pain     Patient will benefit from skilled therapy intervention to address the above noted impairments. PLAN :  Recommendations and Planned Interventions: bed mobility training, transfer training, gait training, therapeutic exercises, patient and family training/education and therapeutic activities      Frequency/Duration: Patient will be followed by physical therapy:  daily to address goals. Recommendation for discharge: (in order for the patient to meet his/her long term goals)   PT with family support    This discharge recommendation:  Has been made in collaboration with the attending provider and/or case management    IF patient discharges home will need the following DME: none         SUBJECTIVE:   Patient stated I just have some pain with this exercise.     OBJECTIVE DATA SUMMARY:   Patient mobilized on continuous portable monitor/telemetry. HISTORY:    History reviewed. No pertinent past medical history. History reviewed. No pertinent surgical history.     Personal factors and/or comorbidities impacting plan of care: decreased endurance    Home Situation  Home Environment: Private residence  # Steps to Enter: 4  Rails to Enter: Yes  Hand Rails : Left  One/Two Story Residence: One story  Living Alone: No  Support Systems: Spouse/Significant Other/Partner  Patient Expects to be Discharged toVF Cor[de-identified]ration  Current DME Used/Available at Home: None  Tub or Shower Type: Shower    EXAMINATION/PRESENTATION/DECISION MAKING:     Critical Behavior:  Neurologic State: Alert, Drowsy  Orientation Level: Oriented X4  Cognition: Follows commands     Hearing: Auditory  Auditory Impairment: None  Skin:    Edema:   Range Of Motion:  AROM: Generally decreased, functional                       Strength:    Strength: Generally decreased, functional                    Tone & Sensation:   Tone: Normal              Sensation: Intact               Coordination:  Coordination: Within functional limits  Vision:      Functional Mobility:  Bed Mobility:  Rolling:  (sitting in the chair )           Transfers:  Sit to Stand: Contact guard assistance  Stand to Sit: Contact guard assistance        Bed to Chair: Contact guard assistance              Balance:   Sitting: Intact; Without support  Standing: Impaired; With support  Standing - Static: Constant support;Good  Standing - Dynamic : Good  Ambulation/Gait Training:                                                 Cardiac diagnosis intervention:  Patient instructed and educated on mindful movement principles based on Move in The Tube concept to include maintaining bilateral elbows close to rib cage when performing any load-bearing activity such as getting in/out of bed, pushing up from a chair, opening a door, or lifting a box. Patient was given a handout with diagrams of each correct/incorrect method of performing each of the above tasks. Therapeutic Exercises:   Patient instructed on the benefits and demonstrated cardiac exercises while sitting with Stand-by assistance. Instructed and indicated understanding on how to progress reps, sets against gravity, pacing through progressive muscle strengthening standing based on surgeon clearance for more weight in prep for basic and instrumental ADLs. Instruction on the use of household items in place of weights as needed.     CARDIAC   EXERCISE    Sets    Reps Active  Active Assist    Passive  Self ROM    Comments    Shoulder flexion  1  5   [x]                            []                             []                             []                                Shoulder abduction  1  5  [x]                             []                             []                             []                                Scapular elevation  1  5  [x]                             []                              []                             []                                Scapular retraction  1  5  [x]                             []                             []                             []                                Trunk rotation  1  5  [x]                             []                             []                             []                                Trunk sidebending  1  5  [x]                             []                              []                             []                                          Functional Measure:  Barthel Index:    Bathin  Bladder: 0  Bowels: 10  Groomin  Dressin  Feeding: 10  Mobility: 10  Stairs: 0  Toilet Use: 5  Transfer (Bed to Chair and Back): 10  Total: 50/100       The Barthel ADL Index: Guidelines  1. The index should be used as a record of what a patient does, not as a record of what a patient could do. 2. The main aim is to establish degree of independence from any help, physical or verbal, however minor and for whatever reason. 3. The need for supervision renders the patient not independent. 4. A patient's performance should be established using the best available evidence. Asking the patient, friends/relatives and nurses are the usual sources, but direct observation and common sense are also important. However direct testing is not needed. 5. Usually the patient's performance over the preceding 24-48 hours is important, but occasionally longer periods will be relevant.   6. Middle categories imply that the patient supplies over 50 per cent of the effort. 7. Use of aids to be independent is allowed. Tesfaye Adame., Barthel, D.W. (0260). Functional evaluation: the Barthel Index. [address] (14)2. BHAVYA Walters, Stephane Francois., Idelia Decree. [place], [address] [place] [address] (1999). Measuring the change indisability after inpatient rehabilitation; comparison of the responsiveness of the Barthel Index and Functional Turner Measure. Journal of Neurology, Neurosurgery, and Psychiatry, 66(4), 800-213. SHAHLA Shay, STEVEN Olea, & Hilda Mccann M.A. (2004.) Assessment of post-stroke quality of life in cost-effectiveness studies: The usefulness of the Barthel Index and the EuroQoL-5D. Quality of Life Research, 15, 868-40            Physical Therapy Evaluation Charge Determination   History Examination Presentation Decision-Making   MEDIUM  Complexity : 1-2 comorbidities / personal factors will impact the outcome/ POC  MEDIUM Complexity : 3 Standardized tests and measures addressing body structure, function, activity limitation and / or participation in recreation  MEDIUM Complexity : Evolving with changing characteristics  Other outcome measures Barthel   MEDIUM      Based on the above components, the patient evaluation is determined to be of the following complexity level: MEDIUM    Pain Rating:  Expected post op pain    Activity Tolerance:   Fair and requires rest breaks    After treatment patient left in no apparent distress:   Sitting in chair, Call bell within reach and Caregiver / family present    COMMUNICATION/EDUCATION:   The patients plan of care was discussed with: Occupational therapist, Registered nurse and Case management. Fall prevention education was provided and the patient/caregiver indicated understanding., Patient/family have participated as able in goal setting and plan of care.  and Patient/family agree to work toward stated goals and plan of care.    Thank you for this referral.  Lorelei Dutton, PT, DPT   Time Calculation: 33 mins

## 2021-08-04 ENCOUNTER — APPOINTMENT (OUTPATIENT)
Dept: GENERAL RADIOLOGY | Age: 66
DRG: 234 | End: 2021-08-04
Attending: PHYSICIAN ASSISTANT
Payer: COMMERCIAL

## 2021-08-04 ENCOUNTER — HOME HEALTH ADMISSION (OUTPATIENT)
Dept: HOME HEALTH SERVICES | Facility: HOME HEALTH | Age: 66
End: 2021-08-04
Payer: COMMERCIAL

## 2021-08-04 LAB
ALBUMIN SERPL-MCNC: 3.5 G/DL (ref 3.5–5)
ALBUMIN/GLOB SERPL: 1.3 {RATIO} (ref 1.1–2.2)
ALP SERPL-CCNC: 46 U/L (ref 45–117)
ALT SERPL-CCNC: 22 U/L (ref 12–78)
ANION GAP SERPL CALC-SCNC: 4 MMOL/L (ref 5–15)
AST SERPL-CCNC: 44 U/L (ref 15–37)
BILIRUB SERPL-MCNC: 0.4 MG/DL (ref 0.2–1)
BUN SERPL-MCNC: 10 MG/DL (ref 6–20)
BUN/CREAT SERPL: 13 (ref 12–20)
CALCIUM SERPL-MCNC: 8.3 MG/DL (ref 8.5–10.1)
CHLORIDE SERPL-SCNC: 111 MMOL/L (ref 97–108)
CO2 SERPL-SCNC: 27 MMOL/L (ref 21–32)
CREAT SERPL-MCNC: 0.75 MG/DL (ref 0.55–1.02)
ERYTHROCYTE [DISTWIDTH] IN BLOOD BY AUTOMATED COUNT: 14.5 % (ref 11.5–14.5)
GLOBULIN SER CALC-MCNC: 2.8 G/DL (ref 2–4)
GLUCOSE BLD STRIP.AUTO-MCNC: 105 MG/DL (ref 65–117)
GLUCOSE BLD STRIP.AUTO-MCNC: 108 MG/DL (ref 65–117)
GLUCOSE BLD STRIP.AUTO-MCNC: 120 MG/DL (ref 65–117)
GLUCOSE BLD STRIP.AUTO-MCNC: 132 MG/DL (ref 65–117)
GLUCOSE SERPL-MCNC: 102 MG/DL (ref 65–100)
HCT VFR BLD AUTO: 24.4 % (ref 35–47)
HGB BLD-MCNC: 8 G/DL (ref 11.5–16)
MAGNESIUM SERPL-MCNC: 2.3 MG/DL (ref 1.6–2.4)
MCH RBC QN AUTO: 31.7 PG (ref 26–34)
MCHC RBC AUTO-ENTMCNC: 32.8 G/DL (ref 30–36.5)
MCV RBC AUTO: 96.8 FL (ref 80–99)
NRBC # BLD: 0 K/UL (ref 0–0.01)
NRBC BLD-RTO: 0 PER 100 WBC
PLATELET # BLD AUTO: 120 K/UL (ref 150–400)
PMV BLD AUTO: 11.3 FL (ref 8.9–12.9)
POTASSIUM SERPL-SCNC: 4.2 MMOL/L (ref 3.5–5.1)
PROT SERPL-MCNC: 6.3 G/DL (ref 6.4–8.2)
RBC # BLD AUTO: 2.52 M/UL (ref 3.8–5.2)
SERVICE CMNT-IMP: ABNORMAL
SERVICE CMNT-IMP: ABNORMAL
SERVICE CMNT-IMP: NORMAL
SERVICE CMNT-IMP: NORMAL
SODIUM SERPL-SCNC: 142 MMOL/L (ref 136–145)
WBC # BLD AUTO: 8.9 K/UL (ref 3.6–11)

## 2021-08-04 PROCEDURE — 82962 GLUCOSE BLOOD TEST: CPT

## 2021-08-04 PROCEDURE — 74011250637 HC RX REV CODE- 250/637: Performed by: PHYSICIAN ASSISTANT

## 2021-08-04 PROCEDURE — 71045 X-RAY EXAM CHEST 1 VIEW: CPT

## 2021-08-04 PROCEDURE — 97530 THERAPEUTIC ACTIVITIES: CPT

## 2021-08-04 PROCEDURE — 85027 COMPLETE CBC AUTOMATED: CPT

## 2021-08-04 PROCEDURE — 97535 SELF CARE MNGMENT TRAINING: CPT

## 2021-08-04 PROCEDURE — APPSS60 APP SPLIT SHARED TIME 46-60 MINUTES: Performed by: PHYSICIAN ASSISTANT

## 2021-08-04 PROCEDURE — 65620000000 HC RM CCU GENERAL

## 2021-08-04 PROCEDURE — 74011000250 HC RX REV CODE- 250: Performed by: THORACIC SURGERY (CARDIOTHORACIC VASCULAR SURGERY)

## 2021-08-04 PROCEDURE — 97116 GAIT TRAINING THERAPY: CPT

## 2021-08-04 PROCEDURE — 74011250636 HC RX REV CODE- 250/636: Performed by: PHYSICIAN ASSISTANT

## 2021-08-04 PROCEDURE — 80053 COMPREHEN METABOLIC PANEL: CPT

## 2021-08-04 PROCEDURE — 97110 THERAPEUTIC EXERCISES: CPT

## 2021-08-04 PROCEDURE — 83735 ASSAY OF MAGNESIUM: CPT

## 2021-08-04 PROCEDURE — 74011250636 HC RX REV CODE- 250/636: Performed by: THORACIC SURGERY (CARDIOTHORACIC VASCULAR SURGERY)

## 2021-08-04 PROCEDURE — 36415 COLL VENOUS BLD VENIPUNCTURE: CPT

## 2021-08-04 PROCEDURE — 77010033678 HC OXYGEN DAILY

## 2021-08-04 PROCEDURE — 74011250637 HC RX REV CODE- 250/637

## 2021-08-04 PROCEDURE — 99232 SBSQ HOSP IP/OBS MODERATE 35: CPT | Performed by: CLINICAL NURSE SPECIALIST

## 2021-08-04 PROCEDURE — P9045 ALBUMIN (HUMAN), 5%, 250 ML: HCPCS | Performed by: THORACIC SURGERY (CARDIOTHORACIC VASCULAR SURGERY)

## 2021-08-04 PROCEDURE — 74011250637 HC RX REV CODE- 250/637: Performed by: THORACIC SURGERY (CARDIOTHORACIC VASCULAR SURGERY)

## 2021-08-04 PROCEDURE — 2709999900 HC NON-CHARGEABLE SUPPLY

## 2021-08-04 RX ORDER — ACETAMINOPHEN 325 MG/1
TABLET ORAL
Status: COMPLETED
Start: 2021-08-04 | End: 2021-08-04

## 2021-08-04 RX ORDER — ALBUMIN HUMAN 50 G/1000ML
25 SOLUTION INTRAVENOUS ONCE
Status: COMPLETED | OUTPATIENT
Start: 2021-08-04 | End: 2021-08-04

## 2021-08-04 RX ORDER — ACETAMINOPHEN 325 MG/1
650 TABLET ORAL
Status: DISCONTINUED | OUTPATIENT
Start: 2021-08-04 | End: 2021-08-05

## 2021-08-04 RX ORDER — NOREPINEPHRINE BITARTRATE/D5W 8 MG/250ML
.5-16 PLASTIC BAG, INJECTION (ML) INTRAVENOUS
Status: DISCONTINUED | OUTPATIENT
Start: 2021-08-04 | End: 2021-08-05

## 2021-08-04 RX ADMIN — DOCUSATE SODIUM AND SENNOSIDES 1 TABLET: 8.6; 5 TABLET, FILM COATED ORAL at 08:15

## 2021-08-04 RX ADMIN — MAGNESIUM OXIDE TAB 400 MG (241.3 MG ELEMENTAL MG) 400 MG: 400 (241.3 MG) TAB at 17:49

## 2021-08-04 RX ADMIN — ATORVASTATIN CALCIUM 40 MG: 40 TABLET, FILM COATED ORAL at 08:13

## 2021-08-04 RX ADMIN — MUPIROCIN: 20 OINTMENT TOPICAL at 08:14

## 2021-08-04 RX ADMIN — GABAPENTIN 300 MG: 300 CAPSULE ORAL at 05:52

## 2021-08-04 RX ADMIN — OXYCODONE 5 MG: 5 TABLET ORAL at 16:01

## 2021-08-04 RX ADMIN — MUPIROCIN: 20 OINTMENT TOPICAL at 21:01

## 2021-08-04 RX ADMIN — CHLORHEXIDINE GLUCONATE 0.12% ORAL RINSE 10 ML: 1.2 LIQUID ORAL at 21:01

## 2021-08-04 RX ADMIN — ENOXAPARIN SODIUM 40 MG: 40 INJECTION SUBCUTANEOUS at 08:15

## 2021-08-04 RX ADMIN — ASPIRIN 81 MG: 81 TABLET, CHEWABLE ORAL at 08:13

## 2021-08-04 RX ADMIN — CLOPIDOGREL BISULFATE 75 MG: 75 TABLET, FILM COATED ORAL at 08:13

## 2021-08-04 RX ADMIN — PANTOPRAZOLE SODIUM 40 MG: 40 TABLET, DELAYED RELEASE ORAL at 08:14

## 2021-08-04 RX ADMIN — LEVOTHYROXINE SODIUM 100 MCG: 0.1 TABLET ORAL at 05:52

## 2021-08-04 RX ADMIN — OXYCODONE 5 MG: 5 TABLET ORAL at 08:11

## 2021-08-04 RX ADMIN — GABAPENTIN 300 MG: 300 CAPSULE ORAL at 13:28

## 2021-08-04 RX ADMIN — MAGNESIUM OXIDE TAB 400 MG (241.3 MG ELEMENTAL MG) 400 MG: 400 (241.3 MG) TAB at 08:14

## 2021-08-04 RX ADMIN — ACETAMINOPHEN 650 MG: 325 TABLET ORAL at 20:00

## 2021-08-04 RX ADMIN — ONDANSETRON 4 MG: 2 INJECTION INTRAMUSCULAR; INTRAVENOUS at 16:01

## 2021-08-04 RX ADMIN — GABAPENTIN 300 MG: 300 CAPSULE ORAL at 21:00

## 2021-08-04 RX ADMIN — AMIODARONE HYDROCHLORIDE 400 MG: 200 TABLET ORAL at 08:13

## 2021-08-04 RX ADMIN — Medication 10 ML: at 05:52

## 2021-08-04 RX ADMIN — CALCIUM CHLORIDE 1 G: 100 INJECTION, SOLUTION INTRAVENOUS at 09:10

## 2021-08-04 RX ADMIN — OXYCODONE 5 MG: 5 TABLET ORAL at 01:43

## 2021-08-04 RX ADMIN — Medication 10 ML: at 21:01

## 2021-08-04 RX ADMIN — ALBUMIN (HUMAN) 25 G: 12.5 INJECTION, SOLUTION INTRAVENOUS at 08:23

## 2021-08-04 RX ADMIN — Medication 10 ML: at 17:49

## 2021-08-04 RX ADMIN — DOCUSATE SODIUM AND SENNOSIDES 1 TABLET: 8.6; 5 TABLET, FILM COATED ORAL at 17:49

## 2021-08-04 RX ADMIN — AMIODARONE HYDROCHLORIDE 400 MG: 200 TABLET ORAL at 21:00

## 2021-08-04 RX ADMIN — CHLORHEXIDINE GLUCONATE 0.12% ORAL RINSE 10 ML: 1.2 LIQUID ORAL at 08:13

## 2021-08-04 NOTE — PROGRESS NOTES
Bedside and Verbal shift change report given to Arlen Saeed RN (oncoming nurse) by Christiano [place] RN (offgoing nurse). Report included the following information SBAR, Kardex, ED Summary, Intake/Output, MAR, Recent Results and Cardiac Rhythm NSR.    2110 Pt requested to be placed on a purewick through the night. Pure wick in place. 2340 While performing reassessment pt states that she is not able to urinate while in the bed with the purewick. Pt assisted to Bedside commode. Then assisted back to bed using sternal precautions during transfer. Pt back in bed and repositioned slightly to the right. 0142 Pt complaining of chest discomfort near incision site. Yesica 5mg given. 0150 Levophed titrated down to 1mcg, BP within parameters. See MAR for further titrations. 0515 Pt up to the bedside commode and CHG bath completed. PT now in chair and completing self care. Total UOP: 2750ml  End of Shift Note    Bedside shift change report given to Abi Parra RN (oncoming nurse) by Ebbie Rubinstein (offgoing nurse). Report included the following information SBAR, Kardex, ED Summary, Intake/Output, MAR and Recent Results    Shift worked:  0217-4420     Shift summary and any significant changes:     See above note     Concerns for physician to address:  None at this time     Zone phone for oncoming shift:   N/A       Activity:  Activity Level:  Up with Assistance  Number times ambulated in hallways past shift: 0  Number of times OOB to chair past shift: 1    Cardiac:   Cardiac Monitoring: Yes      Cardiac Rhythm: Sinus Rhythm    Access:   Current line(s): PIV and central line     Genitourinary:   Urinary status: voiding    Respiratory:   O2 Device: Nasal cannula  Chronic home O2 use?: NO  Incentive spirometer at bedside: YES  Actual Volume (ml): 500 ml  GI:     Current diet:  ADULT DIET Regular; Low Fat/Low Chol/High Fiber/2 gm Na; GLUTEN FREE  Passing flatus: YES  Tolerating current diet: YES       Pain Management:   Patient states pain is manageable on current regimen: YES    Skin:  Live Score: 16  Interventions: float heels, increase time out of bed and PT/OT consult    Patient Safety:  Fall Score:  Total Score: 3  Interventions: bed/chair alarm, gripper socks, pt to call before getting OOB and stay with me (per policy)  High Fall Risk: Yes    Length of Stay:  Expected LOS: 7d 16h  Actual LOS: [place] 28

## 2021-08-04 NOTE — PROGRESS NOTES
Physician Progress Note      PATIENT:               Jazz Graf  CSN #:                  [id number]  :                       1955  ADMIT DATE:       2021 9:49 AM  DISCH DATE:  RESPONDING  PROVIDER #:        Michela ELIAS          QUERY TEXT:    Dr Pati Morris:    Pt admitted with CAD, NSTEMI. Pt noted to have postop hypotension requiring pressor support. If possible, please document in the progress notes and discharge summary if you are evaluating and/or treating any of the following: The medical record reflects the following:  Risk Factors: 66yoF former smoker, NSTEMI s/p 2V CABG with post blood loss anemia,EF reduced    40-45%  Clinical Indicators: Postop SBP 8/ SBP 77/45  - 82/44 MAP 50s, RR 21 - 27, decreased UO and persistent low BP., temp 100.5, Currently on jasson gtt at 80 mcgs. , start levophed drip  Treatment: IABP, pressor support jasson, levophed & cardene, O2 support, blood transfusion, ICU management on hemodynamic monitoring    Thank you,  Katey Lujan RN, CDI  Options provided:  -- Cardiogenic Shock  -- Hemorrhagic Shock  -- Hypovolemic Shock  -- Hypovolemia without Shock  -- Hypotension without Shock  -- Other - I will add my own diagnosis  -- Disagree - Not applicable / Not valid  -- Disagree - Clinically unable to determine / Unknown  -- Refer to Clinical Documentation Reviewer    PROVIDER RESPONSE TEXT:    This patient has hypotension without shock.     Query created by: Ronak Moreno on 2021 4:14 PM      Electronically signed by:  Mayo ELIAS 2021 1:01 PM

## 2021-08-04 NOTE — PROGRESS NOTES
[facility] ICU Progress Note    Admit Date: 2021  POD:  3 Day Post-Op    Procedure:  Procedure(s):  LIEN AND EPIAORTIC ULTRASOUND BY DR Gio Gao - ON PUMP CORONARY ARTERY BYPASS GRAFT X 2 WITH LEFT INTERNAL MAMMARY ARTERY AND GREATER LEFT SAPHENOUS VEIN GRAFTING AND EVH        Subjective:   Pt seen with Dr. Dania Barnhart. Doing well. OOB in chair. On levo at 2. Feels well. Ambulated yesterday. SR.     Objective:   Vitals:  Blood pressure (!) 93/45, pulse 80, temperature 98.6 °F (37 °C), resp. rate 17, height 5' 1\" (1.549 m), weight 139 lb (63 kg), SpO2 100 %. Temp (24hrs), Av.5 °F (36.9 °C), Min:97.9 °F (36.6 °C), Max:98.9 °F (37.2 °C)    Hemodynamics:   CO: CO (l/min): 5 l/min   CI: CI (l/min/m2): 3.1 l/min/m2   CVP: CVP (mmHg): 155 mmHg (21 1030)   SVR: SVR (dyne*sec)/cm5: 1031 (dyne*sec)/cm5 (21 2653)   PAP Systolic: PAP Systolic: 43 (72/92/83 5517)   PAP Diastolic: PAP Diastolic: 19 (82/23/50 0299)   PVR:     SV02: SVO2 (%): 91 % (21 1551)   SCV02:      EKG/Rhythm:  SR    Extubation Date / Time: 21 @ 4372    Ventilator:  Ventilator Volumes  Vt Set (ml): 350 ml (21)  Vt Exhaled (Machine Breath) (ml): 452 ml (21)  Ve Observed (l/min): 10 l/min (21)    Oxygen Therapy:  Oxygen Therapy  O2 Sat (%): 100 % (21)  Pulse via Oximetry: 80 beats per minute (21)  O2 Device: Nasal cannula (21 193)  O2 Flow Rate (L/min): 2 l/min (21 1930)  O2 Temperature:  (HME) (21 1549)  FIO2 (%): 40 % (21 0727)    CXR:  CXR Results  (Last 48 hours)               21 05  XR CHEST PORT Final result    Impression:  Persistent left base atelectasis or other infiltrates status post   CABG with no pneumothorax status post chest tube removal.       Narrative:  EXAM: XR CHEST PORT       INDICATION: postop heart       COMPARISON: 2021. FINDINGS: A portable AP radiograph of the chest was obtained at 05:05 hours.  The   patient is on a cardiac monitor. There has been interval removal of chest and   mediastinal tubes with no pneumothorax. Right central venous introducer catheter   remains in place. There is persistent retrocardiac airspace opacity with   otherwise clear lungs. There are median sternotomy wires and surgical clips   compatible with prior CABG. The cardiac and mediastinal contours and pulmonary   vascularity are normal.  The chest wall structures and visualized upper abdomen   show no acute findings with incidental note of degenerative spine and shoulder   changes as well as diffuse osteopenia. 08/03/21 0441  XR CHEST PORT Final result    Impression:  New left base opacity likely reflects atelectasis status post   extubation. Narrative:  EXAM: XR CHEST PORT       INDICATION: postop heart       COMPARISON: Chest x-ray 8/2/2021. FINDINGS: A portable AP radiograph of the chest was obtained at 04:11 hours. The   patient is on a cardiac monitor. There is a right-sided central venous   introducer catheter in position. Mediastinal and left chest tubes unchanged in   position. Enid-Milagros catheter, endotracheal tube, and enteric tube have been   removed. There is new left basilar opacity. There is no pneumothorax. Right lung   is clear. There are median sternotomy wires and surgical clips compatible with   prior CABG. The cardiac and mediastinal contours and pulmonary vascularity are   normal.  The bones and soft tissues are grossly within normal limits.                   Admission Weight: Last Weight   Weight: 139 lb (63 kg) Weight: 139 lb (63 kg)     Intake / Output / Drain:  Current Shift: 08/04 0701 - 08/04 1900  In: -   Out: 550 [Urine:550]  Last 24 hrs.:     Intake/Output Summary (Last 24 hours) at 8/4/2021 0855  Last data filed at 8/4/2021 0751  Gross per 24 hour   Intake 1634.8 ml   Output 3525 ml   Net -1890.2 ml       EXAM:  General:    Awake & alert Lungs:   Clear to auscultation bilaterally. Incision:  Sternal dressing CDI   Heart:  A-paced @ 80 with underlying SB @ 60 S1, S2 normal, no murmur, rub or gallop. Abdomen:   Soft, non-tender. Bowel sounds hypoactive   Extremities:  No edema. Rt femoral IABP with no hematoma or bleeding. Distal Dp pulses faintly palpable. Neurologic: Awake & alert. Moves all extremities. Labs:   Recent Labs     21  0756 21  0524 21  2121 21  1645 21  1600   WBC  --  8.9  --    < > 17.5*   HGB  --  8.0*  --    < > 8.6*   HCT  --  24.4*  --    < > 26.2*   PLT  --  120*  --    < > 156   NA  --  142  --    < > 144   K  --  4.2  --    < > 4.0   BUN  --  10  --    < > 12   CREA  --  0.75  --    < > 0.84   GLU  --  102*  --    < > 124*   GLUCPOC 120*  --    < >   < >  --    INR  --   --   --   --  1.2*    < > = values in this interval not displayed. Assessment:     Active Problems:    CAD (coronary artery disease) (2021)      S/P CABG x 2 (2021)      Overview: ON PUMP CORONARY ARTERY BYPASS GRAFT X 2 WITH LEFT INTERNAL MAMMARY ARTERY       to LAD, RSVG to OM      GREATER LEFT SAPHENOUS VEIN EVH         Plan/Recommendations/Medical Decision Makin. CAD, NSTEMI, S/P emergent CABG, EF 50%: IABP removed , cont ASA/high intensity statin. Hold BB while on pressors. Wean levo for MAP >65. Volume repletion. 1 g CaCl today. Will need DAPT due to NSTEMI. Start plavix. 2. Hypotension: remains on levo @ 2. Give additional albumin & 1 g CaCl today. Wean for SBP >100. BP runs low at home per the patient. 3. Post-op blood loss anemia: monitor HH  4. Acute post op respiratory insufficiency/Atelectasis: Wean NC as tolerated or O2 sats >92%, TCDB, IS hourly. Progressive ambulation. 5. Post op thrombocytopenia: mild, cont ASA. Switch pepcid to protonix  6. HLD: high intensity statin  7. Hypothyroidism: continue Levothyroxine.   8. GERD: cont protonix  9. Prophylaxis/Nutrition: SQ lovenox, tolerating regular diet    Dispo: wean levo to off today. PT/OT. Ambulate. Eval for transfer later if off pressors.     Signed By: CURT Leary

## 2021-08-04 NOTE — PROGRESS NOTES
Cardiac Surgery Care Coordinator- Met with Sonam Martinez, reviewed plan of care and discussed potential discharge date. Reinforced sternal precautions and encouraged continued use of the incentive spirometer. Reviewed goals for the day and emphasized the importance of increased activity to meet discharge goals. Will continue to follow for educational and emotional needs.  Radha Torres RN

## 2021-08-04 NOTE — DIABETES MGMT
[facility]    CLINICAL NURSE SPECIALIST CONSULT     Initial Presentation   Nicole Hill is a 63 y.o. female admitted 8/1/21 with complaints of mid-epigastric pain radiating to the jaw, associated with dyspnea. No known cardiac history. Afebrile. Normotensive. 02 sats 97%   LAB: WBC 11.4. Serum creatinine 0.91/GFR >60. AST 25/ALT 28. Troponin +  CXR: Negative    HX: History reviewed. No pertinent past medical history. INITIAL DX: CAD. STEMI    Treatment plan     TX: 8/1/21 LHC: 1- Severe left main and 3 vessel CAD. 2- Mildly depressed LV systolic fxn with inf HK. 3- Normal LVEDP =>   LIEN AND EPIAORTIC ULTRASOUND BY DR Abhishek Rodriguez - ON PUMP CORONARY ARTERY BYPASS GRAFT X 2 WITH LEFT INTERNAL MAMMARY ARTERY AND GREATER LEFT SAPHENOUS VEIN GRAFTING AND EVH     Amio. Anti-lipid. Pain management. Clot prevention GI protection. Hospital course   Clinical progress has been uncomplicated. 8/1/21 ECHO:  Post intervention followup study after coronary artery bypass grafting of two vessels on cardiopulmonary bypass, the left ventricular ejection fraction was 55%. There was improved inferior wall motion. The right ventricular function was preserved. There were no changes in valvular function. There were no effusions and the patient's ascending aorta was intact. The intra-aortic balloon pump position was unchanged  8/2/21 Plans to extubate and remove IABP this morning. Receiving albumin. CBC will be checked later today to determine if blood transfusion is needed. CT & Lopez in place     CXR: IABP appears stable and satisfactory, with tip located several centimeters below the aortic arch. There is no apparent pneumothorax. Lungs show no acute findings. Heart size is stable. There is no pulmonary edema. PRBCs +.  8/3/21 02NC. Chest tubes out. CXR: New left base opacity likely reflects atelectasis status post extubation.   8/4/21 CXR: Persistent left base atelectasis or other infiltrates status post CABG with no pneumothorax status post chest tube removal.    Diabetes    Patient did not have diabetes PTA. Family history UNKNOWN for diabetes. Admission ; no A1c available. Consulted by Provider for advanced diabetes nursing assessment and care, specifically related to   [x] Transitioning off Floria Craft   [x] Inpatient management strategy    Subjective   \"I'm taking it slow (referring to dietary intake). I walked in the nieves yesterday; they'll be back this afternoon. \"     Objective   Physical exam  General Normal weight female. In no acute distress. Up to chair eating breakfast  Neuro  Alert & oriented. Vital Signs Afebrile. SR. Hypotensive  Visit Vitals  BP (!) 91/47   Pulse 77   Temp 98.8 °F (37.1 °C)   Resp 17   Ht 5' 1\" (1.549 m)   Wt 63 kg (139 lb)   SpO2 100%   BMI 26.26 kg/m²     Extremities No foot wounds    Diabetic foot exam:    Left Foot     Visual Exam: normal    Pulse DP: 1+ (weak)  Right Foot   Visual Exam: normal    Pulse DP: 1+ (weak)    Laboratory  Tests 8/4 8/3 8/2 8/1/21   A1c        106 79 112   Anion gap 4 8 6 8   Serum triglycerides       WBC 8.9 12.4 10.7 11.4   Serum creatinine 0.75 0.9 0.77 0.91   GFR >60 >60 >60 >60   AST 44 90 130 25   ALT 22 24 30 28   Troponin    Pos   TSH  0.21       Factors impacting BG management  Factor Dose Comments   Nutrition:  Regular   Non-diabetic meals   Ate 51-75% yesterday. Eating breakfast now   Pain: Surgical Scheduled Toradol & Neurontin  MS prn Rated 0-6   Infection Ancef X6 doses Afebrile.  WBC trended down to normal     Blood glucose pattern        Assessment and Plan   Nursing Diagnosis Risk for unstable blood glucose pattern   Nursing Intervention Domain 7787 Decision-making Support   Nursing Interventions Examined current inpatient diabetes control   Explored factors facilitating and impeding inpatient management     Evaluation   This normal weight  female without diabetes was admitted & found to have significant coronary vascular disease. Underwent emergent CABG 8/1/21. With regard to BG control, patient has easily achieved BG control via Glucostabilizer. Used 9 units of insulin the day of surgery (8/1/21), 21.6 units 8/2/21 and 17 units 8/3/21. Transitioned off 8/3/21afternoon with tiny dose of Lantus. BGs in the 102-120 range this AM. No further intervention required. Recommendations     [x] Continue corrective insulin approach    Signing off     Billing Code(s)     [x] 25851 IP subsequent hospital care - 25 minutes    Before making these care recommendations, I personally reviewed the hospitalization record, including notes, laboratory & diagnostic data and current medications, and examined the patient at the bedside (circumstances permitting) before making care recommendations.      Total minutes: [address], CNS  Diabetes Clinical Nurse Specialist  [facility]  Access via [facility]

## 2021-08-04 NOTE — PROGRESS NOTES
Interdisciplinary team rounds were held   8/4/2021  with the following team members:Care Management, Diabetes Treatment Specialist, Nursing, Nutrition, Pharmacy, Physician, Physical therapy,  and Clinical Coordinator. Plan of care discussed. Goal: See MD orders and progress notes for further  interventions and desired outcomes.

## 2021-08-04 NOTE — PROGRESS NOTES
Bedside and Verbal shift change report given to Jeniffer Paige RN (oncoming nurse) by Shira Rawls RN (offgoing nurse). Report included the following information SBAR, Kardex, ED Summary, Intake/Output, MAR, Recent Results and Cardiac Rhythm NSR.    2110 Pt requested to be placed on a purewick through the night. Pure wick in place. 2340 While performing reassessment pt states that she is not able to urinate while in the bed with the purewick. Pt assisted to Bedside commode. Then assisted back to bed using sternal precautions during transfer. Pt back in bed and repositioned slightly to the right. 0142 Pt complaining of chest discomfort near incision site. Yesica 5mg given. End of Shift Note    Bedside shift change report given to *** (oncoming nurse) by Jamee aLtham (offgoing nurse). Report included the following information SBAR, Kardex, ED Summary, Intake/Output, MAR and Recent Results    Shift worked:  [id number]     Shift summary and any significant changes:     ***     Concerns for physician to address:  ***     Zone phone for oncoming shift:   N/A       Activity:  Activity Level:  Up with Assistance  Number times ambulated in hallways past shift: {Numbers; 0-5:823169}  Number of times OOB to chair past shift: {Numbers; 0-5:432506}    Cardiac:   Cardiac Monitoring: {YES/NO:12898}      Cardiac Rhythm: Sinus Rhythm    Access:   Current line(s): {access:56705}     Genitourinary:   Urinary status: {:96017}    Respiratory:   O2 Device: Nasal cannula  Chronic home O2 use?: {YES/NO/NA:69579}  Incentive spirometer at bedside: {YES/NO/NA:70424}  Actual Volume (ml): 500 ml  GI:     Current diet:  ADULT DIET Regular; Low Fat/Low Chol/High Fiber/2 gm Na; GLUTEN FREE  Passing flatus: {YES/NO:28706}  Tolerating current diet: {YES/NO:57668}       Pain Management:   Patient states pain is manageable on current regimen: {YES/NO/NA:68112}    Skin:  Tala Score: 16  Interventions: {tala interventions:38311}    Patient Safety:  Fall Score:  Total Score: 3  Interventions: {fall interventions:67970}  High Fall Risk: Yes    Length of Stay:  Expected LOS: 7d 16h  Actual LOS: [address]

## 2021-08-04 NOTE — PROGRESS NOTES
Transition of Care Plan:     RUR:  13%  Disposition:  Home w/ spouse; [facility]  Follow up appointments:  Card Surg  DME needed: none  Transportation at Discharge: spouse  Keys or means to access home:  spouse      IM Medicare Letter:  n/a  Is patient a BCPI-A Bundle:    n/a                  If yes, was Bundle Letter given?:     Caregiver Contact: Juan Carlos Chute  Discharge Caregiver contacted prior to discharge?       Reason for Admission:  Pt is a 62 yo female admitted for emergent cath and subsequent CABG.      Pt lives w/ her  in a 1-story house and was indep for mobility and ADLs pta to include driving. RUR Score:  13%                   Plan for utilizing home health:   Pt was given [facility] for [facility] providers but did not have a preference - will use any provider in network with her insurance. Ref sent to [facility] via [address].      Accepted. PCP:    First and Last name:  Rishi Ashraf MD                 Name of Practice:               Are you a current patient: Yes/No:  Yes              Approximate date of last visit:               Can you participate in a virtual visit with your PCP:                     Current Advanced Directive/Advance Care Plan: Full Code        Healthcare Decision Maker:   Click here to complete [address] including selection of the Healthcare Decision Maker Relationship (ie \"Primary\")                              Transition of Care Plan:                     Pt is progressing in CCU per protocol. HH orders are SIgned and Held. Ref sent to [facility].     Insurance info is not appearing on pt's hospital account. CM spoke w/ pt's  who confirmed that they do have BC/BS coverage. He will bring insur card when he visits tonight and ask RN to make copies of card to be placed in bedside chart.   8/4/21 Update:  Tonia ID [id number]  Copy of insurance card to be sent to Pt Access to be scanned into chart.

## 2021-08-04 NOTE — PROGRESS NOTES
[facility]      Name: Kaye Paniagua   : 1955   MRN: [id number]   Date: 2021      Assessment:   Brief patient summary:  63 F former smoker with hx of hypothyroidism, GERD [facility] [address] [facility]  with CP radiating to her jaw. EKG revealed Marked ST abnormality, possible inferolateral subendocardial injury. Emergent LHC revealed severe multivessel coronary artery disease, including a high-grade left main, left anterior and circumflex. The right coronary artery is chronically occluded and small. She went for emergent CABG . [facility] asked to assist in her ICU care    Hospital course/major results:   As above   Extubated - tolerating well. IABP removed.  No major events. Remains on low dose vasopressors   No major events. Room air. NAD. Norepi 2 mcg/min    Active Problems: (By systems)  PRINCIPLE DIAGNOSIS:  1. S/P emergent CABG for critical L main disease    PULMONARY:  2. Mild hypoxemia, resolved  3. Former smoker without prior diagnosis of COPD    CARDIOVASCULAR/HEMODYNAMIC:  4. CAD  5. S/P 2V CABG   6. Persistent hypotension  a. Pt report that her BP tends to run low chronically     RENAL:  7. No issues    GI/NUTRITION:  8. H/O GERD    Current nutritional support: Reg diet    INFECTIOUS DISEASE  9. No issues    HEME  10. Post op anemia  a. Received RBCs     NEURO  11. Post op pain, controlled    RASS goal: 0    OTHER  12. GOALS OF CARE/ADVANCED DIRECTIVES  13. CODE STATUS: Full      ICU Comprehensive Plan of Care:     Plans for this Shift:  1. Continue supplemental O2 as needed  2. Cont hemodynamic monitoring  3. MAP goal > 60  4. SBP goal > 90  5. Wean vasopressors for above BP goals  6. Monitor chemistry panels daily  7. Correct electrolytes as indicated  8. Nutrition: Reg diet  9. SUP: PO PPI  10. Glycemic control: SSI  11. Possible transfer to PCU later today      At this point, [facility] has little further to offer. Will sign off.  Please call if we can be of further assistance    HPI/Consult/Subjective:   24 Hr Events 2021:   No major events    SUBJ:   RASS 0. NAD. Cognition intact    Past Medical History:      has no past medical history on file. Past Surgical History:      has no past surgical history on file. Home Medications:     Prior to Admission medications    Medication Sig Start Date End Date Taking? Authorizing Provider   levothyroxine (SYNTHROID) 100 mcg tablet Take 100 mcg by mouth Daily (before breakfast). Yes Provider, Historical   green tea leaf extract (GREEN TEA PO) Take  by mouth Every morning. Yes Provider, Historical   OTHER Take  by mouth every evening. CHAMOMILE TEA   Yes Provider, Historical       Allergies/Social/Family History:     No Known Allergies   Social History     Tobacco Use    Smoking status: Never Smoker    Smokeless tobacco: Never Used   Substance Use Topics    Alcohol use: Not Currently      History reviewed. No pertinent family history.         Objective:   Vital Signs:  Visit Vitals  BP (!) 106/50   Pulse 80   Temp 98.8 °F (37.1 °C)   Resp 22   Ht 5' 1\" (1.549 m)   Wt 63 kg (139 lb)   SpO2 99%   BMI 26.26 kg/m²    O2 Flow Rate (L/min): 2 l/min O2 Device: Nasal cannula Temp (24hrs), Av.5 °F (36.9 °C), Min:97.9 °F (36.6 °C), Max:98.9 °F (37.2 °C)    CVP (mmHg): 155 mmHg (21 1030)      Intake/Output:     Intake/Output Summary (Last 24 hours) at 2021 1426  Last data filed at 2021 1220  Gross per 24 hour   Intake 1002.11 ml   Output 4075 ml   Net -3072.89 ml       Physical Exam:  GEN: NAD  HEENT: NCAT, sclerae white  NECK:  No JVD  CHEST: No wheezes  CARDIAC: NSR, reg, no M  ABD: soft, diminished BS  EXT: Cool, no edema  NEURO: No focal deficits  DERM: No lesions    I have examined the patient on this day 2021 and the above documented exam is accurate including the components that have been copied forward    LABS AND  DATA: Personally reviewed  Recent Labs     21  0061 08/03/21  0508   WBC 8.9 12.4*   HGB 8.0* 8.9*   HCT 24.4* 27.1*   * 128*     Recent Labs     08/04/21  0524 08/03/21  0508    142   K 4.2 3.7   * 109*   CO2 27 25   BUN 10 14   CREA 0.75 0.90   * 106*   CA 8.3* 7.9*   MG 2.3 2.3     Recent Labs     08/04/21  0524 08/03/21  0508   AP 46 41*   TP 6.3* 5.8*   ALB 3.5 3.4*   GLOB 2.8 2.4     Recent Labs     08/01/21  1600   INR 1.2*   PTP 12.7*   APTT 23.5      No results for input(s): PHI, PCO2I, PO2I, FIO2I in the last 72 hours. No results for input(s): CPK, CKMB, TROIQ, BNPP in the last 72 hours. Hemodynamics:   PAP: PAP Systolic: 43 (00/73/02 9991) CO: CO (l/min): 5 l/min (08/02/21 1551)   Wedge:   CI: CI (l/min/m2): 3.1 l/min/m2 (08/02/21 1551)   CVP:  CVP (mmHg): 155 mmHg (08/03/21 1030) SVR:       PVR:       Ventilator Settings:  Mode Rate Tidal Volume Pressure FiO2 PEEP   SIMV   350 ml  12 cm H2O 40 % 5 cm H20     Peak airway pressure: 11 cm H2O    Minute ventilation: 10 l/min        MEDS: Reviewed    Chest X-Ray:  CXR Results  (Last 48 hours)               08/04/21 0529  XR CHEST PORT Final result    Impression:  Persistent left base atelectasis or other infiltrates status post   CABG with no pneumothorax status post chest tube removal.       Narrative:  EXAM: XR CHEST PORT       INDICATION: postop heart       COMPARISON: 8/30/2021. FINDINGS: A portable AP radiograph of the chest was obtained at 05:05 hours. The   patient is on a cardiac monitor. There has been interval removal of chest and   mediastinal tubes with no pneumothorax. Right central venous introducer catheter   remains in place. There is persistent retrocardiac airspace opacity with   otherwise clear lungs. There are median sternotomy wires and surgical clips   compatible with prior CABG.  The cardiac and mediastinal contours and pulmonary   vascularity are normal.  The chest wall structures and visualized upper abdomen   show no acute findings with incidental note of degenerative spine and shoulder   changes as well as diffuse osteopenia. 08/03/21 0441  XR CHEST PORT Final result    Impression:  New left base opacity likely reflects atelectasis status post   extubation. Narrative:  EXAM: XR CHEST PORT       INDICATION: postop heart       COMPARISON: Chest x-ray 8/2/2021. FINDINGS: A portable AP radiograph of the chest was obtained at 04:11 hours. The   patient is on a cardiac monitor. There is a right-sided central venous   introducer catheter in position. Mediastinal and left chest tubes unchanged in   position. [place] catheter, endotracheal tube, and enteric tube have been   removed. There is new left basilar opacity. There is no pneumothorax. Right lung   is clear. There are median sternotomy wires and surgical clips compatible with   prior CABG. The cardiac and mediastinal contours and pulmonary vascularity are   normal.  The bones and soft tissues are grossly within normal limits. Multidisciplinary Rounds Completed:  Yes      SPECIAL EQUIPMENT  PA Catheter    DISPOSITION  Stay in ICU    CRITICAL CARE CONSULTANT NOTE  I had a in-person encounter with Vick Sahni, reviewed and interpreted patient data including events, labs, images, vital signs, I/O's, and examined patient. I have discussed the case and the plan and management of the patient's care with the consulting services, the bedside nurses and the respiratory therapist.      NOTE OF PERSONAL INVOLVEMENT IN CARE   This patient is at high risk for sudden and clinically significant deterioration, which requires the highest level of preparedness to intervene urgently. I participated in the decision-making and personally managed or directed the management of the following life and organ supporting interventions that required my frequent assessment to treat or prevent imminent deterioration. I personally spent -- minutes of critical care time.   This is time spent at patient's bedside actively involved in patient care as well as the coordination of care and discussions with the patient's family. This does not include any procedural time which has been billed separately.     Tommy Yanez MD  Pulmonary/[facility] Critical [facility]  [phone]  8/4/2021

## 2021-08-04 NOTE — PROGRESS NOTES
Problem: Mobility Impaired (Adult and Pediatric)  Goal: *Acute Goals and Plan of Care (Insert Text)  Description: FUNCTIONAL STATUS PRIOR TO ADMISSION: Patient was independent and active without use of DME.     HOME SUPPORT PRIOR TO ADMISSION: The patient lived with her . Physical Therapy Goals  Initiated 8/3/2021  1. Patient will move from supine to sit and sit to supine , scoot up and down, and roll side to side in bed with independence within 5 days. 2.  Patient will perform sit to/from stand with independence within 5 days. 3.  Patient will ambulate 250 feet with least restrictive assistive device and independence within 5 days. 4.  Patient will ascend/descend 5 stairs with one sided handrail(s) with modified independence within 5 days. 5.  Patient will perform cardiac exercises per protocol with modified independence within 5 days. 6.  Patient will verbally recall and functionally demonstrate mindful-based movements (\"move in the tube\") principles without cues within 5 days. Outcome: Progressing Towards Goal   PHYSICAL THERAPY TREATMENT  Patient: Issac Morgan (70 y.o. female)  Date: 8/4/2021  Diagnosis: CAD (coronary artery disease) [I25.10] <principal problem not specified>  Procedure(s) (LRB):  LEFT AND RIGHT HEART CATH / CORONARY ANGIOGRAPHY (N/A)  Ultrasound Guided Vascular Access (N/A)  Intra-Aortic Balloon Pump Insertion (N/A) 3 Days Post-Op  Precautions: Sternal (move in the tube)  Chart, physical therapy assessment, plan of care and goals were reviewed. ASSESSMENT  Patient continues with skilled PT services and is progressing towards goals. Patient received resting in bed, agreeable and cleared for therapy. Patient required SBA for bed mobility with verbal cues. Patient with VSS on 2L O2 and able to wean to RA with activity. Patient ambulated 175 feet with CGA-SBA.  She was able to ambulate half of distance without AD with slow, mildly unsteady gait with verbal cues for arm swing. Encouraged patient to ambulate later with RN to continue to improve endurance and patient in agreement. Patient is verbalizing and is demonstrating understanding of mindful-based movements (\"move in the tube\") principles of keeping UEs proximal to ribcage to prevent lateral pull on the sternum during load-bearing activities with visual and verbal cues required for compliance. Current Level of Function Impacting Discharge (mobility/balance): CGA-SBA without AD    Other factors to consider for discharge: pain, decreased endurance         PLAN :  Patient continues to benefit from skilled intervention to address the above impairments. Continue treatment per established plan of care. to address goals. Recommendation for discharge: (in order for the patient to meet his/her long term goals)   PT with support of     This discharge recommendation:  Has been made in collaboration with the attending provider and/or case management    IF patient discharges home will need the following DME: none       SUBJECTIVE:   Patient stated I sat up for a while earlier.     OBJECTIVE DATA SUMMARY:   Patient mobilized on continuous portable monitor/telemetry. Critical Behavior:  Neurologic State: Alert  Orientation Level: Oriented X4  Cognition: Follows commands, Appropriate decision making  Safety/Judgement: Fall prevention    Functional Mobility Training:  Bed Mobility:  Rolling: Stand-by assistance  Supine to Sit: Stand-by assistance     Scooting: Stand-by assistance          Transfers:  Sit to Stand: Stand-by assistance  Stand to Sit: Stand-by assistance        Bed to Chair: Contact guard assistance                      Balance:  Sitting: Intact; Without support  Standing: Impaired; With support  Standing - Static: Good  Standing - Dynamic : Good    Ambulation/Gait Training:  Distance (ft): 175 Feet (ft)  Assistive Device: Gait belt;Walker, rollator (half distance without AD )  Ambulation - Level of Assistance: Contact guard assistance;Stand-by assistance     Gait Description (WDL): Exceptions to WDL  Gait Abnormalities: Decreased step clearance;Shuffling gait        Base of Support: Widened     Speed/Floresita: Pace decreased (<100 feet/min)  Step Length: Right shortened;Left shortened                      Cardiac diagnosis intervention:  Patient instructed and educated on mindful movement principles based on Move in The Tube concept to include maintaining bilateral elbows close to rib cage when performing any load-bearing activity such as getting in/out of bed, pushing up from a chair, opening a door, or lifting a box. Patient was given a handout with diagrams of each correct/incorrect method of performing each of the above tasks. Therapeutic Exercises:   Patient instructed on the benefits and demonstrated cardiac exercises while sitting with Supervision. Instructed and indicated understanding on how to progress reps, sets against gravity, pacing through progressive muscle strengthening standing based on surgeon clearance for more weight in prep for basic and instrumental ADLs. Instruction on the use of household items in place of weights as needed.      CARDIAC  EXERCISE   Sets   Reps   Active Active Assist   Passive Self ROM   Comments   Shoulder flexion 1 10 [x]                                            []                                            []                                            []                                               Shoulder abduction 1 10 [x]                                            []                                            []                                            []                                               Scapular elevation 1 10 [x]                                            []                                            []                                            []                                               Scapular retraction 1 10 [x] []                                            []                                            []                                               Trunk rotation 1 10 [x]                                            []                                            []                                            []                                               Trunk sidebending 1 10 [x]                                            []                                            []                                            []                                                  []                                            []                                            []                                            []                                                     Activity Tolerance:   Fair and SpO2 stable on RA    After treatment patient left in no apparent distress:   Sitting in chair and Call bell within reach    COMMUNICATION/COLLABORATION:   The patients plan of care was discussed with: Occupational therapist, Registered nurse, and Case management.      Tonya Tripp PT, DPT   Time Calculation: 33 mins

## 2021-08-04 NOTE — PROGRESS NOTES
End of Shift Note    Bedside shift change report given to Darris Canavan, RN (oncoming nurse) by Roseanna Gonzalez RN (offgoing nurse). Report included the following information SBAR, Kardex, ED Summary, OR Summary, Procedure Summary, Intake/Output, MAR, Accordion, Recent Results, Alarm Parameters , Pre Procedure Checklist, Procedure Verification and Quality Measures    Shift worked:  7a-7p     Shift summary and any significant changes:     0718 Bedside report from [address] Full systems assessment complete. See flowsheet for details. 1051 Full systems reassessment complete. No changes from prior assessment, see flowsheet for details. 1559 Full systems reassessment complete. No changes from prior assessment, see flowsheet for details. Concerns for physician to address:  n/a     Zone phone for oncoming shift:   n/a       Activity:  Activity Level: Up with Assistance  Number times ambulated in hallways past shift: 1  Number of times OOB to chair past shift: 3    Cardiac:   Cardiac Monitoring: Yes      Cardiac Rhythm: Sinus Rhythm    Access:   Current line(s): PIV and central line     Genitourinary:   Urinary status: voiding    Respiratory:   O2 Device: Nasal cannula  Chronic home O2 use?: NO  Incentive spirometer at bedside: YES  Actual Volume (ml): 500 ml  GI:     Current diet:  ADULT DIET Regular; Low Fat/Low Chol/High Fiber/2 gm Na; GLUTEN FREE  Passing flatus: YES  Tolerating current diet: YES       Pain Management:   Patient states pain is manageable on current regimen: YES    Skin:  Live Score: 16  Interventions: speciality bed, float heels, increase time out of bed, foam dressing, limit briefs, internal/external urinary devices and nutritional support     Patient Safety:  Fall Score:  Total Score: 3  Interventions: bed/chair alarm, assistive device (walker, cane, etc), gripper socks, pt to call before getting OOB and stay with me (per policy)  High Fall Risk: Yes    Length of Stay:  Expected LOS: 7d 16h  Actual LOS: 3      Camila Mitchell, RN

## 2021-08-04 NOTE — PROGRESS NOTES
Problem: Self Care Deficits Care Plan (Adult)  Goal: *Acute Goals and Plan of Care (Insert Text)  Description: FUNCTIONAL STATUS PRIOR TO ADMISSION: Patient was independent and active without use of DME.     HOME SUPPORT: The patient lived with family but did not require assist.    Occupational Therapy Goals  Initiated 8/3/2021  1. Patient will perform ADLs standing 5 mins without fatigue or LOB with supervision/set-up within 7 day(s). 2.  Patient will perform lower body ADLs with supervision/set-up within 7 day(s). 3.  Patient will perform gathering ADL items high and low 2/2 with supervision/set-up within 7 day(s). 4.  Patient will perform toilet transfers with supervision/set-up within 7 day(s). 5.  Patient will perform all aspects of toileting with supervision/set-up within 7 day(s). 6.  Patient will participate in cardiac/sternal upper extremity therapeutic exercise/activities to increase independence with ADLs with supervision/set-up for 5 minutes within 7 day(s). Outcome: Progressing Towards Goal   OCCUPATIONAL THERAPY TREATMENT  Patient: Debbie Benavides (17 y.o. female)  Date: 8/4/2021  Diagnosis: CAD (coronary artery disease) [I25.10] <principal problem not specified>  Procedure(s) (LRB):  LEFT AND RIGHT HEART CATH / CORONARY ANGIOGRAPHY (N/A)  Ultrasound Guided Vascular Access (N/A)  Intra-Aortic Balloon Pump Insertion (N/A) 3 Days Post-Op  Precautions: Sternal (move in the tube)  Chart, occupational therapy assessment, plan of care, and goals were reviewed. ASSESSMENT  Patient continues with skilled OT services and is progressing towards goals. Pt now performing ADL at a Setup to Min A level and mobility with CGA. O2 saturation levels were stable on RA. BP was soft yet stable with activity. Pt is now donning socks with Setup A only and UB clothing items with Setup to Min A.  Pt reports ambulating to [facility] frequently for toileting needs and was accepting of encouragement to ambulate to bathroom with staff assist in order to maximize endurance. Pt verbalized pain in R shoulder with ABD and IR d/t prior R rotator cuff injury and states she was receiving OP PT just PTA. Reviewed modification for exercises, encouraged her to perform at pain-free level and emphasized importance completing all exercises symmetrically. Pt will continue to benefit from skilled acute OT to maximize return to her independent PLOF. Patient is demonstrating understanding of mindful-based movements (\"move in the tube\") principles of keeping UEs proximal to ribcage to prevent lateral pull on the sternum during load-bearing activities with visual cues required for compliance. Current Level of Function Impacting Discharge (ADLs): Up to Min A     Other factors to consider for discharge: Independent PLOF. R shouler p! d/t prior rotator cuff injury          PLAN :  Patient continues to benefit from skilled intervention to address the above impairments. Continue treatment per established plan of care to address goals. Recommend with staff: x1 assist with OOB functional tasks. Active ADL engagement. Encourage up to bathroom for toileting needs. Recommend next OT session: Continue POC    Recommendation for discharge: (in order for the patient to meet his/her long term goals)  No skilled occupational therapy/ follow up rehabilitation needs identified at this time. OP cardiac rehab when cleared by MD.     This discharge recommendation:  Has been made in collaboration with the attending provider and/or case management    IF patient discharges home will need the following DME: none       SUBJECTIVE:   Patient stated Froylan Lin you for all your help.     OBJECTIVE DATA SUMMARY:   Cognitive/Behavioral Status:  Neurologic State: Alert  Orientation Level: Oriented X4  Cognition: Follows commands; Appropriate decision making    Functional Mobility and Transfers for ADLs:  Bed Mobility:  Rolling: Stand-by assistance  Supine to Sit: Stand-by assistance  Scooting: Stand-by assistance    Transfers:  Sit to Stand: Stand-by assistance     Bed to Chair: Contact guard assistance    Balance:  Sitting: Intact; Without support  Standing: Impaired; With support  Standing - Static: Good  Standing - Dynamic : Good    ADL Intervention:     Guided Pt through performance of UB dressing with min cues to keep BUEs together above 90 degrees when reaching for garments. Upper Body Dressing Assistance  Shirt simulation with hospital gown: Minimum  assistance  Pullover Shirt: Set-up; Supervision  Cues: Verbal cues provided;Physical assistance (to bring both UEs together when reaching behind neck)    Lower Body Dressing Assistance  Socks: Independent  Leg Crossed Method Used: Yes  Position Performed: Seated in chair    Patient instructed and educated on mindful movement principles based on Move in The Tube concept to include maintaining bilateral elbows close to rib cage when performing any load-bearing activity such as getting in/out of bed, pushing up from a chair, opening a door, or lifting a box. Patient was given a handout with diagrams of each correct/incorrect method of performing each of the above tasks. Patient instructed on the ability to utilize upper extremities outside the tube when doing any non-load bearing activity such as washing hair/body, brushing teeth, retrieving clothing items, or scratching your back. Patient encouraged to also perform upper extremity exercises \"outside of the tube\" to prevent scar tissue formation around sternal incision site. Patient instructed no asymmetrical reaching over head to ensure B UEs when shoulders >90* i.e. reaching in cabinets and dressing. Instruction on upper body dressing techniques of over head, then arms through to decrease pain and unilateral shoulder flexion >90*. Instruction on the benefits of utilizing B UEs during functional tasks i.e. opening the fridge, stepping into the tub.      Instruction if continued pain at home with shoulder IR for BM hygiene can use wet wipes and toilet tongs PRN. Avoid valsalva maneuvers. Therapeutic Exercises:   Guided Pt through performance of the below cardiac ex seated in chair with verbal cues for recall, technique and isolation of proper muscle groups. Pt with c/o pain/discomfort during R shoulder ABD and IR d/t prior rotator cuff injury. Pt was receiving OP PT just PTA. Instructed Pt to complete the below ex to pain-free level in R shoulder and to keep UEs symmetrical t/o cardiac exercises. CARDIAC   EXERCISE    Sets    Reps    Active  Active Assist    Passive  Self ROM    Comments    Shoulder flexion  1  10  [x]                            []                             []                             []                                Shoulder abduction  1  10  [x]                             []                             []                             []                                Scapular elevation  1  10  [x]                             []                              []                             []                                Scapular retraction  1  10 [x]                             []                             []                             []                                Trunk rotation  1  10 [x]                             []                             []                             []                                Trunk sidebending  1  10 [x]                             []                              []                             []                                          Pain:  Endorsed mild chest discomfort, yet did not interfere with session. Activity Tolerance:   Good on RA, DBP soft yet stable. After treatment patient left in no apparent distress:   Sitting in chair and Call bell within reach    COMMUNICATION/COLLABORATION:   The patients plan of care was discussed with: Physical therapist, Registered nurse, and Patient . Annel Kwon, OTR/L  Time Calculation: 35 mins

## 2021-08-04 NOTE — ADT AUTH CERT NOTES
[address]     FACILITY NPI : [id number]  FACILITY TAX ID :      [address]  [facility] 2 CRITICAL CARE [address] [address]  [phone]        DEPT CONTACT:  Lam Bazan  #[phone]  NQO#[phone]         Patient Name :Tiburcio Schwab   : 1955 (63 yrs)  MRN : [id number]     Patient Mailing Address [address] [facility] [47] , [address]                                                               .         Insurance Plan Payor: BLUE CROSS / Plan: [facility] PPO / Product Type: PPO /      Primary Coverage Subscriber ID : [id number]     Secondary Coverage:  N/A     Secondary Subscriber ID :        Current Patient Class : INPATIENT  Admit Date : 2021     REQUESTED LEVEL OF CARE: INPATIENT [101]                                                           Diagnosis : CAD (coronary artery disease)                          ICD10 Code : CAD (coronary artery disease) [I25.10]     Current Room and Bed 2546/01     Admitting and Attending Info:  Admitting Provider : Julián Gao MD   NPI: [id number]  Admitting Provider Phone. [phone]  Admitting Provider Address: [address] [place] [address], [address] [facility]     Attending Provider Roberto Cid MD   [id number]  Attending Provider Address:  [address] [address]     Attending Provider Phone: Attending phys phone: [phone]             Utilization Reviews       Coronary Artery Bypass Graft (CABG) - Care Day 2 (2021) by Ankit Masterson       Review Entered Review Status   2021 14:55 Completed      Criteria Review      Care Day: 2 Care Date: 2021 Level of Care: ICU    Guideline Day 2 Clinical Status    (X) * Procedure completed    8/2/2021 14:55:01 EDT by Alize Hogan      POD 1 s/p LIEN and epiaortic US-on IABP. CABG x2 w/Lft int mamm art and grtr left saph vein graft and EVH. On vent overnight for hemodynamics. IABP 1:2 removed ad 1045. Atrial paced for BP supp. CT output dec (470cc since OR). ( ) * Hemodynamic stability    8/2/2021 14:55:01 EDT by Alize Hogan      Febrile and hypotensive. Temp up to 100.5, BP 77/45 at 0800, 82/44 at 1100. Art line in place. Chest tube x1-dressing changes due daily. Lopez cath dc today    (X) * No evidence of myocardial ischemia    8/2/2021 14:55:01 EDT by Alize Hogan      NA    (X) * Mechanical ventilation absent    8/2/2021 14:55:01 EDT by Alize Hogan      On vent until 0900 when extubated to 2lpm via NC. RR 12-23. sats 100%. Precedex and dirprivan drips off prior to extubation. ABG on vent prior to extubation: 7.43/36/158/23/99    Activity    ( ) * Up to chair    8/2/2021 14:55:01 EDT by Alize Hogan      Bedrest x6 hrs ordered after IABP pulled at 1030. PT eval to be completed tomorrow. Routes    ( ) * Clear liquid diet, advance as tolerated    8/2/2021 14:55:01 EDT by Alize Hogan      Still NPO    (X) IV fluids    8/2/2021 14:55:01 EDT by Dionte Ordoñez at 10cc/hr, NS at 9cc/hr cont    (X) Oral or parenteral medications    8/2/2021 14:55:01 EDT by Alize Hogan      Regular insulin drip, jasson drip, levophed drip (NEW), Zofran 4mg IV Q4H PRN x1, Tylenol 1000mg po q6H RTC, ancef 2gram IV Q6H, pepcid 20mg po q24H, neurontin 100mg po tid, synthroid 100mcg po qd, cordarone 400mg po q12H. Humalog ssi tid ac not req    Interventions    (X) Oxygen    8/2/2021 14:55:01 EDT by Alize Hogan      O2 at 2lpm via NC    ( ) Assess cardiac biomarkers    8/2/2021 14:55:01 EDT by Alize Hogan      LABS: chl 113, zack 8.0, tprot 5.4, glob 1.8, ast 130, alk phos 38. Hgb 7.5, hct 23.0.  CXR: no sig change    Medications    ( ) Possible epidural analgesics    8/2/2021 14:55:01 EDT by Eve De Paz      PAIN MED: Toradol 15mg IV Q6H RTC (NEW), morphine 4mg IV Q2H PRN x2 through 1400    (X) Aspirin    8/2/2021 14:55:01 EDT by Eve De Paz      ASA 81mg po qd    (X) Statin    8/2/2021 14:55:01 EDT by Eve De Paz      lipitor 40mg po qd    * Milestone   Additional Notes   Active Problems: (By systems)   PRINCIPLE DIAGNOSIS:   1. S/P emergent CABG for critical L main disease        PULMONARY:   2. Mild hypoxemia   3. Former smoker without prior diagnosis of COPD       CARDIOVASCULAR/HEMODYNAMIC:   4. CAD   5. S/P 2V CABG 08/01   6. Post op hypotension       RENAL:   7. No issues       GI/NUTRITION:   8. H/O GERD       INFECTIOUS DISEASE   9. No issues       HEME   10. No issues       NEURO   11. Post op pain, controlled       RASS goal: 0       GOALS OF CARE/ADVANCED DIRECTIVES   13. CODE STATUS: Full        ICU Comprehensive Plan of Care:       Plans for this Shift:   1. Continue supplemental O2 as needed   2. ICU hemodynamic monitoring   3. MAP goal > 65   4. SBP goal > 100   5. Wean vasopressors for above BP goals   6. Monitor chemistry panels daily   7. Correct electrolytes as indicated   8. Nutrition: NPO for now. Advance diet once able to sit up   9. SUP: IV PPI   10. Glycemic control: insulin gtt   11. Micro and antibiotics as documented above   12.  Needs to lie flat for 6 hrs after IABP removal      Physical Exam:   GEN: NAD   HEENT: NCAT, sclerae white, ETT present   NECK:  No JVD   CHEST: No wheezes   CARDIAC: NSR, reg, no M   ABD: soft, diminished BS   EXT: Cool, no edema   NEURO: No focal deficits   DERM: No lesions      Coronary Artery Bypass Graft (CABG) - Care Day 1 (8/1/2021) by Abi Butler       Review Entered Review Status   8/2/2021 11:34 Completed      Criteria Review      Care Day: 1 Care Date: 8/1/2021 Level of Care: ICU    Guideline Day 1    Level Of Care    (X) OR to ICU    8/2/2021 11:34:35 EDT by [facility]is [facility]      ICU    Clinical Status    (X) * Clinical Indications met    2021 11:34:35 EDT by Gm Waddell old female w/PMH as doc to ED c/o chest pain persisting despite ntg. Diagnosed with STEMI-to urgent heart cath and LIEN and IABP placed with emergent CABG x2 immediately following LHC. 2 atrial wires, 1vent wire, 3 omari drains. EBL 350cc    (X) Intubated    2021 11:34:35 EDT by Marta Dominique post op with AB.30/40/315/19/100. 98.2, 90, 119/53, 14, sats 100% on vent post op. 12 lead EKG: NSR, rate 73    Routes    (X) IV fluids    2021 11:34:35 EDT by Cheyenne Avila      IVF: .45NS at 10cc/hr, NS at 9cc/hr. Add meds: zack chloride 1gram IV PRN x1    (X) IV medications    2021 11:34:35 EDT by Cheyenne Avila      heparin 12un/kg/hr through 1550. Sodium bicarb 50meq IV x1, protamine 250mg IV x1, mg sulf 2gr IV x1, albumin 12.5gram IV Q2H PRN x4, pepcid 20mg IV Q24H, diprivan drip, neosynepherine drip, reg insulin drip, precedex drip, morphine 4mg IV Q2H PRN x2    Interventions    (X) Chest tube    2021 11:34:35 EDT by Cheyenne Avila      CT x1: 10-40cc/hr    (X) Central lines    2021 11:34:35 EDT by Luz Soler art line. IABP 1:2. CVP checks 5-7-IV albumin given. (X) Oxygen    2021 11:34:35 EDT by Cheyenne Avila      intubated/vent    (X) Assess cardiac biomarkers    2021 11:34:35 EDT by Cheyenne Avila      Trop 0.68 at 2637. Zack: 9.9 at 0953, 7.1 at 1600, 8.9 at 1645. Mag 3.2 at 1600, 3.0 at 1645, 2.6 at 2005. AST 79 at 1600, 131 at 2005. WBC 11.4 at 0953, 17.5 at 1600, 18.6 at 2005. (): hgb 9.0/hct 27.5. aptt >130 at 1035/23.5 at 1600.  CXR wnl    Medications    (X) Prophylactic antibiotics    2021 11:34:35 EDT by Cheyenne Avila      ancef 2gram IV Q6H    ( ) Possible epidural analgesics    2021 11:34:35 EDT by Cheyenne Avila      PO/NGT: tylenol 1000mg q6H PRN x2    (X) Possible aspirin    2021 11:34:35 EDT by Mahesh Hernandez, Kandi      asa 324mg po x1 preop    (X) Antihypertensive medication if needed    8/2/2021 11:34:35 EDT by Dominguez Washington      Tridil drip cont from 1000-off at 1500    * Milestone   Additional Notes   PRINCIPLE DIAGNOSIS:   1. S/P emergent CABG for critical L main disease           PULMONARY:   2. Post op ventilator status   3. Former smoker without prior diagnosis of COPD       CARDIOVASCULAR/HEMODYNAMIC:   4. CAD   5. S/P 2V CABG 08/01   6. Pot op hypotension   7. IABP in place       RENAL:   8. No issues       GI/NUTRITION:   9. H/O GERD       Current nutritional support:        INFECTIOUS DISEASE   10. No issues       HEME   11. No issues       NEURO   12. Post op pain       RASS goal: 0       OTHER   13.         GOALS OF CARE/ADVANCED DIRECTIVES   14. CODE STATUS: Full           ICU Comprehensive Plan of Care:       Plans for this Shift:   1. Ventilator settings/ABG reviewed with RT    2. Wean per CTS protocol   3. CCM Service will be available to oversee timing of extubation   4. ICU hemodynamic monitoring   5. MAP goal > 65   6. SBP goal > 100   7. Post op and hemodynamic mgmt per CTS protocol   8. Monitor chemistry panels daily   9. Correct electrolytes as indicated   10. Nutrition: none   11. SUP: IV PPI   12. Glycemic control: SSI   13.  Micro and antibiotics as documented above         Coronary Artery Bypass Graft (CABG) - Clinical Indications for Procedure by Karen Short       Review Entered Review Status   8/2/2021 11:12 Completed      Criteria Review      Clinical Indications for Procedure    Most Recent : Dominguez Washington Most Recent Date: 8/2/2021 11:12:58 EDT    (X) Procedure is indicated for  1 or more  of the following  (1) (2) (3) (4) (5) (6):       (X) Significant left main coronary artery stenosis, as indicated by  1 or more  of the following       :          (X) 50% or more luminal diameter narrowing          8/2/2021 11:12:58 EDT by Dominguez Washington            During emergent card cath today-severe multi-vessel ADD including a high grade left main, left ant and circumflex. Rt coronary artery is chr occluded and small    Notes:    8/2/2021 11:12:58 EDT by Nicho Marley    Subject: Additional Clinical Information      * Pt is 65yr old female with PMH hypothyroidism and GERD to ED for persistent chest discomfort following ngt. Elevated troponin to 0.68 and STEMI diagnosed. Pt needs emergent CABG today for intraop LHC findings as documented to multiple vessels including left main. H&P Notes     H&P by Avtar Syed PA-C at 08/01/21 1213 documented on ED to Hosp-Admission (Current) from 8/1/2021 in [facility] 2 CRITICAL CARE 1  Author: Avtar Syed PA-C Author Type: Physician Assistant Filed: 08/01/21 9618   Note Status: Signed Cosign: Cosigned by Redd Walker MD at 08/02/21 1001 Date of Service: 08/01/21 1213   : Avtar Syed PA-C (Physician Assistant)      CSS   History and Physical     Subjective:      Reanna Rose is a 63 y.o. female who was referred for cardiac evaluation by Dr Kristofer Wright. 64 y/o female with h/o hypothyroidism & GERD developed chest pain and belching today. Seen in ED and found to have inferolateral ST depressions. Troponin elevated at 0.68. Cxr clear.      Dr Kristofer Wright consulted and pt taken to the Cath Lab emergently and found to have significant 3 VD with reduced EF (40-45%) and inferior wall hypokinesis. No vessels amenable to PCI. Chest pain persisted during cath, so IABP placed with relief of symptoms. Dr Dariela Weems consulted for emergent CABG.    Pt seen on cath lab table for brief history. No current chest pain or SOB.      Denies previous MI, stroke, HTN, diabetes, smoking.  Occasional Etoh.      Does have h/o hypothyroidism, HLD (no meds) and h/o basal cell Ca excised from nose x 3 (no radiation).     Cardiac Testing     Cardiac catheterization: severe 3 VD, EF reduced ~ 40-45% with inferior wall hypokinesis     ECHO:n/a     History reviewed. No pertinent past medical history. History reviewed. No pertinent surgical history. Social History           Tobacco Use    Smoking status: Never Smoker    Smokeless tobacco: Never Used   Substance Use Topics    Alcohol use: Not Currently      History reviewed. No pertinent family history. Prior to Admission medications    Medication Sig Start Date End Date Taking? Authorizing Provider   levothyroxine (SYNTHROID) 100 mcg tablet Take 100 mcg by mouth Daily (before breakfast).     Yes Provider, Historical   green tea leaf extract (GREEN TEA PO) Take  by mouth Every morning.     Yes Provider, Historical   OTHER Take  by mouth every evening. CHAMOMILE TEA     Yes Provider, Historical       No Known Allergies     Review of Systems: Limited due to medical urgency     CV: + chest pain  Resp: No SOB  GI: + GERD  Skin: + h/o basal cell Ca nose, excised x 3  Neuro: Denies strokes, or TIAs. Endocrine:+ hypothyroidism     Objective:      VS:      Physical Exam:    General appearance: awake & alert. No distress  Eyes: Sclera clear  Neck: no JVD  Lungs: clear to auscultation bilaterally  Heart: regular rate and rhythm; no murmur  Abdomen: soft, non-tender; bowel sounds normal, no organomegaly  Extremities: IABP right femoral artery with no bleeding or hematoma. Left femoral pulse 2+. No DP or PT pulses palpable in either LE  Skin: Skin color normal; No varicose veins or edema. Neurologic: Alert & oriented.  CN grossly intact.     Labs:       Recent Labs     08/01/21  0953   WBC 11.4*   HGB 13.5   HCT 41.3         K 4.1   BUN 15   CREA 0.91   *         Diagnostics:   PA and lateral: No acute process     Carotid doppler:      PFTS-FEV1:      EKG:   Assessment:      Active Problems:    CAD (coronary artery disease) (8/1/2021)           Plan:   The risk and benefit of surgery were reviewed with patient and family and all questions answered and the patient wishes to proceed. Risk include infection, bleeding, stroke, heart attack, irregular heart rhythm, kidney failure and death.         STS Risk Calculator V2.81 - Discussed by surgeon with patient. Risk of Mortality:  2.746%  Renal Failure:  1.130%  Permanent Stroke:  1.594%  Prolonged Ventilation:  17.703%  DSW Infection:  0.123%  Reoperation:  2.855%  Morbidity or Mortality:  21.776%  Short Length of Stay:  36.041%  Long Length of Stay:  4.992%              Treatment Plan:    1. CAD, NSTEMI (IMI), reduced EF, IABP pre-op: Emergent CABG  2. HLD: no meds at home. Will need statin post-op  3. Hypothyroidism: continue home meds        Signed By: Marie Nick PA-C      2021               H&P filed by Maylin Sue MD at 21 1228 / Draft: Not Electronically Signed / documented on ED to Hosp-Admission (Current) from 2021 in [facility] 2 CRITICAL CARE 1  Author: Maylin Sue MD Author Type: Physician Filed: 21 1228   Completion Status: In Progress Availability: Unavailable Document ID: [id number]   Note Status: Unsigned Transcription Date of Service: 21 1058     : Maylin Sue MD (Physician)      Transcription Details: H&P   Dictated By: Maylin Sue MD Dictated Date: Not found Dictated Time: Not found   Transcribed By: Not found Transcribed Date: 21 Transcribed Time: [address]  HISTORY AND PHYSICAL     Name:  Yun Phelps  MR#:  [id number]  :  1955  ACCOUNT #:  [de-identified]  ADMIT DATE:  2021        CHIEF COMPLAINT:  Chest pain and jaw pain.     HISTORY OF PRESENT ILLNESS:  The patient is a 53-year-old female without prior cardiac history, but with a history of dyslipidemia and gastroesophageal reflux. She has been bothered for some time by gastroesophageal reflux and typically has midsternal chest discomfort.   She did see Dr. Jun Casas and unfortunately, she has been intolerant of PPIs in the past.  Yesterday, she developed midsternal discomfort which persisted throughout the day and into the evening. She had trouble sleeping last night. She did have some numbness in her left arm and jaw pain as well. The patient decided to come to the ER this morning and her initial EKG showed ST depression inferiorly and laterally. Initial POC troponin was 0.43 and I was asked to see the patient for evaluation. The patient has received nitroglycerin and aspirin. She had some relief of her discomfort with nitroglycerin, but is still having some discomfort. She was given IV heparin in the emergency room. She is currently hemodynamically stable. She denies hypertension or diabetes and is a nonsmoker.     PAST MEDICAL HISTORY:  Gastroesophageal reflux, hypothyroidism.     PAST SURGICAL HISTORY:  Status post hand surgery, status post tonsillectomy, status post D and C.     MEDICATIONS PRIOR TO ADMISSION:  L-thyroxine.     SOCIAL HISTORY:  The patient does not smoke and she only occasionally drinks alcohol.     FAMILY HISTORY:  Her father had heart disease.     REVIEW OF SYSTEMS:  As noted above. No melena and hematochezia.     PHYSICAL EXAMINATION:  GENERAL:  Exam reveals an anxious-appearing late middle-aged white female. VITAL SIGNS:  Blood pressure 142/68, pulse 74, respirations 16, temperature 98. 2. HEENT:  Pupils are equal and reactive. Oropharynx moist oral mucosa. NECK:  Supple. No masses or thyromegaly. No cervical or supraclavicular adenopathy. No carotid bruits. No JVD. CHEST:  Clear. No wheezes or crackles. SKIN:  Warm and dry. CARDIAC:  Regular rate and rhythm. Normal S1 and S2. No obvious murmurs, rubs, gallops or clicks. ABDOMEN:  Soft and nontender. No masses or organomegaly. Bowel sounds are positive. EXTREMITIES:  No cyanosis, clubbing or edema. Distal pulses 1-2+ in the feet bilaterally.   NEURO:  No obvious gross motor deficits.     LABORATORY DATA:  EKG normal sinus rhythm, normal axis, lateral and inferior ST depression, rate is 73 beats per minute, hemoglobin 13.5. BUN 15, creatinine 0.9, troponin 0.68. Chest x-ray is negative.     IMPRESSION:  1. Acute coronary syndrome with EKG changes as noted above. 2.  History of gastroesophageal reflux. 3.  Dyslipidemia. 4.  Hypothyroidism.     RECOMMENDATIONS:  We will start IV nitroglycerin and IV heparin. Continue aspirin and add a beta-locker. We will plan on taking the patient to the cath lab this morning.   This was discussed with the patient and her  and they understand and agree with the plan.        Zulay Ewing MD        [id number]  D:  08/01/2021 10:56  T:  08/01/2021 12:27  JOB #:  [id number]  CC:  Janina Velazquez MD

## 2021-08-05 ENCOUNTER — APPOINTMENT (OUTPATIENT)
Dept: GENERAL RADIOLOGY | Age: 66
DRG: 234 | End: 2021-08-05
Attending: PHYSICIAN ASSISTANT
Payer: COMMERCIAL

## 2021-08-05 ENCOUNTER — TRANSCRIBE ORDER (OUTPATIENT)
Dept: CARDIAC REHAB | Age: 66
End: 2021-08-05

## 2021-08-05 DIAGNOSIS — Z95.1 POSTSURGICAL AORTOCORONARY BYPASS STATUS: Primary | ICD-10-CM

## 2021-08-05 LAB
ABO + RH BLD: NORMAL
ALBUMIN SERPL-MCNC: 3.5 G/DL (ref 3.5–5)
ALBUMIN/GLOB SERPL: 1.3 {RATIO} (ref 1.1–2.2)
ALP SERPL-CCNC: 48 U/L (ref 45–117)
ALT SERPL-CCNC: 17 U/L (ref 12–78)
ANION GAP SERPL CALC-SCNC: 3 MMOL/L (ref 5–15)
AST SERPL-CCNC: 25 U/L (ref 15–37)
BILIRUB SERPL-MCNC: 0.5 MG/DL (ref 0.2–1)
BLD PROD TYP BPU: NORMAL
BLD PROD TYP BPU: NORMAL
BLOOD GROUP ANTIBODIES SERPL: NORMAL
BPU ID: NORMAL
BPU ID: NORMAL
BUN SERPL-MCNC: 12 MG/DL (ref 6–20)
BUN/CREAT SERPL: 17 (ref 12–20)
CALCIUM SERPL-MCNC: 8.5 MG/DL (ref 8.5–10.1)
CHLORIDE SERPL-SCNC: 111 MMOL/L (ref 97–108)
CO2 SERPL-SCNC: 30 MMOL/L (ref 21–32)
CREAT SERPL-MCNC: 0.71 MG/DL (ref 0.55–1.02)
CROSSMATCH RESULT,%XM: NORMAL
CROSSMATCH RESULT,%XM: NORMAL
ERYTHROCYTE [DISTWIDTH] IN BLOOD BY AUTOMATED COUNT: 14.1 % (ref 11.5–14.5)
GLOBULIN SER CALC-MCNC: 2.7 G/DL (ref 2–4)
GLUCOSE BLD STRIP.AUTO-MCNC: 106 MG/DL (ref 65–117)
GLUCOSE BLD STRIP.AUTO-MCNC: 126 MG/DL (ref 65–117)
GLUCOSE BLD STRIP.AUTO-MCNC: 89 MG/DL (ref 65–117)
GLUCOSE BLD STRIP.AUTO-MCNC: 93 MG/DL (ref 65–117)
GLUCOSE SERPL-MCNC: 89 MG/DL (ref 65–100)
HCT VFR BLD AUTO: 24.4 % (ref 35–47)
HGB BLD-MCNC: 7.7 G/DL (ref 11.5–16)
MCH RBC QN AUTO: 30.9 PG (ref 26–34)
MCHC RBC AUTO-ENTMCNC: 31.6 G/DL (ref 30–36.5)
MCV RBC AUTO: 98 FL (ref 80–99)
NRBC # BLD: 0 K/UL (ref 0–0.01)
NRBC BLD-RTO: 0 PER 100 WBC
PLATELET # BLD AUTO: 144 K/UL (ref 150–400)
PMV BLD AUTO: 11.2 FL (ref 8.9–12.9)
POTASSIUM SERPL-SCNC: 4 MMOL/L (ref 3.5–5.1)
PROT SERPL-MCNC: 6.2 G/DL (ref 6.4–8.2)
RBC # BLD AUTO: 2.49 M/UL (ref 3.8–5.2)
SERVICE CMNT-IMP: ABNORMAL
SERVICE CMNT-IMP: NORMAL
SODIUM SERPL-SCNC: 144 MMOL/L (ref 136–145)
SPECIMEN EXP DATE BLD: NORMAL
STATUS OF UNIT,%ST: NORMAL
STATUS OF UNIT,%ST: NORMAL
UNIT DIVISION, %UDIV: 0
UNIT DIVISION, %UDIV: 0
WBC # BLD AUTO: 6.7 K/UL (ref 3.6–11)

## 2021-08-05 PROCEDURE — 2709999900 HC NON-CHARGEABLE SUPPLY

## 2021-08-05 PROCEDURE — 74011250636 HC RX REV CODE- 250/636: Performed by: PHYSICIAN ASSISTANT

## 2021-08-05 PROCEDURE — 74011250637 HC RX REV CODE- 250/637: Performed by: PHYSICIAN ASSISTANT

## 2021-08-05 PROCEDURE — 82962 GLUCOSE BLOOD TEST: CPT

## 2021-08-05 PROCEDURE — 85027 COMPLETE CBC AUTOMATED: CPT

## 2021-08-05 PROCEDURE — 97535 SELF CARE MNGMENT TRAINING: CPT

## 2021-08-05 PROCEDURE — 74011250637 HC RX REV CODE- 250/637: Performed by: THORACIC SURGERY (CARDIOTHORACIC VASCULAR SURGERY)

## 2021-08-05 PROCEDURE — 97116 GAIT TRAINING THERAPY: CPT

## 2021-08-05 PROCEDURE — 74011000250 HC RX REV CODE- 250: Performed by: THORACIC SURGERY (CARDIOTHORACIC VASCULAR SURGERY)

## 2021-08-05 PROCEDURE — 97110 THERAPEUTIC EXERCISES: CPT

## 2021-08-05 PROCEDURE — 80053 COMPREHEN METABOLIC PANEL: CPT

## 2021-08-05 PROCEDURE — 71045 X-RAY EXAM CHEST 1 VIEW: CPT

## 2021-08-05 PROCEDURE — 65660000000 HC RM CCU STEPDOWN

## 2021-08-05 PROCEDURE — 36415 COLL VENOUS BLD VENIPUNCTURE: CPT

## 2021-08-05 PROCEDURE — APPNB180 APP NON BILLABLE TIME > 60 MINS: Performed by: PHYSICIAN ASSISTANT

## 2021-08-05 PROCEDURE — 97530 THERAPEUTIC ACTIVITIES: CPT

## 2021-08-05 RX ORDER — SODIUM CHLORIDE 0.9 % (FLUSH) 0.9 %
5-40 SYRINGE (ML) INJECTION EVERY 8 HOURS
Status: CANCELLED | OUTPATIENT
Start: 2021-08-05

## 2021-08-05 RX ORDER — METOPROLOL SUCCINATE 25 MG/1
12.5 TABLET, EXTENDED RELEASE ORAL DAILY
Status: DISCONTINUED | OUTPATIENT
Start: 2021-08-05 | End: 2021-08-06 | Stop reason: HOSPADM

## 2021-08-05 RX ORDER — AMIODARONE HYDROCHLORIDE 200 MG/1
TABLET ORAL
Qty: 84 TABLET | Refills: 0 | Status: SHIPPED | OUTPATIENT
Start: 2021-08-05

## 2021-08-05 RX ORDER — ACETAMINOPHEN 500 MG
1000 TABLET ORAL EVERY 6 HOURS
Status: DISCONTINUED | OUTPATIENT
Start: 2021-08-05 | End: 2021-08-06 | Stop reason: HOSPADM

## 2021-08-05 RX ORDER — ATORVASTATIN CALCIUM 40 MG/1
40 TABLET, FILM COATED ORAL DAILY
Qty: 30 TABLET | Refills: 2 | Status: SHIPPED | OUTPATIENT
Start: 2021-08-06

## 2021-08-05 RX ORDER — OXYCODONE HYDROCHLORIDE 5 MG/1
5 TABLET ORAL
Qty: 28 TABLET | Refills: 0 | Status: SHIPPED | OUTPATIENT
Start: 2021-08-05 | End: 2021-08-12

## 2021-08-05 RX ORDER — SODIUM CHLORIDE 0.9 % (FLUSH) 0.9 %
5-40 SYRINGE (ML) INJECTION AS NEEDED
Status: CANCELLED | OUTPATIENT
Start: 2021-08-05

## 2021-08-05 RX ORDER — GUAIFENESIN 100 MG/5ML
81 LIQUID (ML) ORAL DAILY
Qty: 30 TABLET | Refills: 2 | Status: SHIPPED | OUTPATIENT
Start: 2021-08-06

## 2021-08-05 RX ORDER — AMOXICILLIN 250 MG
1 CAPSULE ORAL
Qty: 30 TABLET | Refills: 1 | Status: SHIPPED | OUTPATIENT
Start: 2021-08-05

## 2021-08-05 RX ORDER — CLOPIDOGREL BISULFATE 75 MG/1
75 TABLET ORAL DAILY
Qty: 30 TABLET | Refills: 2 | Status: SHIPPED | OUTPATIENT
Start: 2021-08-06

## 2021-08-05 RX ORDER — ACETAMINOPHEN 500 MG
1000 TABLET ORAL EVERY 6 HOURS
Qty: 30 TABLET | Refills: 2 | Status: SHIPPED | OUTPATIENT
Start: 2021-08-05

## 2021-08-05 RX ADMIN — Medication 10 ML: at 06:03

## 2021-08-05 RX ADMIN — CLOPIDOGREL BISULFATE 75 MG: 75 TABLET, FILM COATED ORAL at 08:06

## 2021-08-05 RX ADMIN — AMIODARONE HYDROCHLORIDE 400 MG: 200 TABLET ORAL at 08:06

## 2021-08-05 RX ADMIN — Medication 10 ML: at 14:00

## 2021-08-05 RX ADMIN — MUPIROCIN: 20 OINTMENT TOPICAL at 20:52

## 2021-08-05 RX ADMIN — Medication 10 ML: at 21:19

## 2021-08-05 RX ADMIN — ATORVASTATIN CALCIUM 40 MG: 40 TABLET, FILM COATED ORAL at 08:06

## 2021-08-05 RX ADMIN — MUPIROCIN: 20 OINTMENT TOPICAL at 08:09

## 2021-08-05 RX ADMIN — ACETAMINOPHEN 1000 MG: 500 TABLET ORAL at 11:07

## 2021-08-05 RX ADMIN — MAGNESIUM OXIDE TAB 400 MG (241.3 MG ELEMENTAL MG) 400 MG: 400 (241.3 MG) TAB at 17:22

## 2021-08-05 RX ADMIN — GABAPENTIN 300 MG: 300 CAPSULE ORAL at 06:03

## 2021-08-05 RX ADMIN — DOCUSATE SODIUM AND SENNOSIDES 1 TABLET: 8.6; 5 TABLET, FILM COATED ORAL at 08:06

## 2021-08-05 RX ADMIN — PANTOPRAZOLE SODIUM 40 MG: 40 TABLET, DELAYED RELEASE ORAL at 08:06

## 2021-08-05 RX ADMIN — ENOXAPARIN SODIUM 40 MG: 40 INJECTION SUBCUTANEOUS at 08:06

## 2021-08-05 RX ADMIN — CHLORHEXIDINE GLUCONATE 0.12% ORAL RINSE 10 ML: 1.2 LIQUID ORAL at 08:09

## 2021-08-05 RX ADMIN — AMIODARONE HYDROCHLORIDE 400 MG: 200 TABLET ORAL at 20:52

## 2021-08-05 RX ADMIN — METOPROLOL SUCCINATE 12.5 MG: 25 TABLET, EXTENDED RELEASE ORAL at 11:08

## 2021-08-05 RX ADMIN — POLYETHYLENE GLYCOL 3350 17 G: 17 POWDER, FOR SOLUTION ORAL at 08:05

## 2021-08-05 RX ADMIN — ASPIRIN 81 MG: 81 TABLET, CHEWABLE ORAL at 08:06

## 2021-08-05 RX ADMIN — CHLORHEXIDINE GLUCONATE 0.12% ORAL RINSE 10 ML: 1.2 LIQUID ORAL at 20:52

## 2021-08-05 RX ADMIN — OXYCODONE 5 MG: 5 TABLET ORAL at 06:06

## 2021-08-05 RX ADMIN — GABAPENTIN 300 MG: 300 CAPSULE ORAL at 14:13

## 2021-08-05 RX ADMIN — MAGNESIUM OXIDE TAB 400 MG (241.3 MG ELEMENTAL MG) 400 MG: 400 (241.3 MG) TAB at 08:06

## 2021-08-05 RX ADMIN — LEVOTHYROXINE SODIUM 100 MCG: 0.1 TABLET ORAL at 06:03

## 2021-08-05 RX ADMIN — GABAPENTIN 300 MG: 300 CAPSULE ORAL at 20:51

## 2021-08-05 NOTE — PROGRESS NOTES
Cardiac Surgery Care Coordinator- Met with Violeta Pompa and ,  reviewed plan of care and discussed upcoming discharge date. Reinforced sternal precautions and encouraged continued use of the incentive spirometer. Began discharge teaching and encouraged them to verbalize. Reviewed goals for the day and discussed the  importance of increased activity and continued activity after discharge. Reviewed importance of wearing the red reminder bracelet and when to call MD. Will continue to follow for educational and emotional needs.  Annmarie Mccullough RN Patient unable to urinate at this time and given water.  Explained the importance of providing urine specimen

## 2021-08-05 NOTE — CARDIO/PULMONARY
Cardiac Rehab Note: chart review/referral     Consult has been acknowledged     Pt is a 64 yo admitted with NSTEMI, emergent CABGX2, 8/1/21. Smoking history assessed. Patient is a never smoker. Smoking Cessation Program link has not been added to the AVS.     EF 52%  on 8/1/21 per LIEN. CABG educational folder with \"Cardiac Surgery Post-Op Instructions\" book, heart healthy eating, warning signs, heart facts, heart aware, resources, and CABG Surgery booklet to Kadi Landis on 8/2/21 by Taryn Wright [facility] nurse navigator. Educated using teach back method. Reviewed the use of bear for sternal support, daily weight and temperature monitoring, showering restrictions, signs and symptoms of infection at surgery sites, daily walking and arm exercises, and use of incentive spirometer. Kadi Landis was able to demonstrate proper use of incentive spirometer, achieving 750 ml. Reviewed risk factors for CAD to include the following: family history, elevated BMI, hyperlipidemia, hypertension, diabetes, stress, and smoking. Discussed Heart Healthy/Low Sodium (less than 2000 mg.) diet. Emphasized the value of cardiac rehab. Discussed Cardiac Rehab Program format, benefits, and encouraged enrollment to assist with risk modification and management. Initial Cardiac Rehab Program intake appointment scheduled for 9/7/21 @10:30am and appointment information is on the AVS. Patient provided orientation packet, instructed to complete packet a couple days prior to appointment, wear gym appropriate clothing and shoes, and bring current list of medications. Patient is to call if unable to keep appointment, need to reschedule or additional questions. Kadi Landis verbalized understanding with questions answered. CP Rehab will continue to follow for educational needs.

## 2021-08-05 NOTE — PROGRESS NOTES
Problem: Mobility Impaired (Adult and Pediatric)  Goal: *Acute Goals and Plan of Care (Insert Text)  Description: FUNCTIONAL STATUS PRIOR TO ADMISSION: Patient was independent and active without use of DME.     HOME SUPPORT PRIOR TO ADMISSION: The patient lived with her . Physical Therapy Goals  Initiated 8/3/2021  1. Patient will move from supine to sit and sit to supine , scoot up and down, and roll side to side in bed with independence within 5 days. 2.  Patient will perform sit to/from stand with independence within 5 days. 3.  Patient will ambulate 250 feet with least restrictive assistive device and independence within 5 days. 4.  Patient will ascend/descend 5 stairs with one sided handrail(s) with modified independence within 5 days. 5.  Patient will perform cardiac exercises per protocol with modified independence within 5 days. 6.  Patient will verbally recall and functionally demonstrate mindful-based movements (\"move in the tube\") principles without cues within 5 days. Outcome: Progressing Towards Goal     PHYSICAL THERAPY TREATMENT  Patient: Vinh Roberson (17 y.o. female)  Date: 8/5/2021  Diagnosis: CAD (coronary artery disease) [I25.10] <principal problem not specified>  Procedure(s) (LRB):  LEFT AND RIGHT HEART CATH / CORONARY ANGIOGRAPHY (N/A)  Ultrasound Guided Vascular Access (N/A)  Intra-Aortic Balloon Pump Insertion (N/A) 4 Days Post-Op  Precautions: Sternal (move in the tube)  Chart, physical therapy assessment, plan of care and goals were reviewed. ASSESSMENT  Patient continues with skilled PT services and is progressing towards goals. Patient received sitting in the chair, agreeable and cleared for therapy. She reports feeling much better, requiring supervision for all functional mobility. Patient ambulated 400 feet with supervision without AD with fairly steady gait noted.  She negotiated 12 stairs with SBA and L HR with steady gait noted and no concerns on stair training. Mild SOB noted with stairs,  bpm and O2 sats at 95% on RA. Patient continues to have decreased endurance and encouraged ambulating a total of 3 times daily with nursing staff. Will follow up tomorrow for PT session for any mobility concerns although patient reports she is going home tomorrow. Patient is verbalizing and is demonstrating understanding of mindful-based movements (\"move in the tube\") principles of keeping UEs proximal to ribcage to prevent lateral pull on the sternum during load-bearing activities with visual and verbal cues required for compliance. Current Level of Function Impacting Discharge (mobility/balance): supervision-SBA    Other factors to consider for discharge: decreased endurance, motivated         PLAN :  Patient continues to benefit from skilled intervention to address the above impairments. Continue treatment per established plan of care. to address goals. Recommendation for discharge: (in order for the patient to meet his/her long term goals)  HH PT vs none with support from     This discharge recommendation:  Has been made in collaboration with the attending provider and/or case management    IF patient discharges home will need the following DME: none       SUBJECTIVE:   Patient stated Last night, I just immediately snapped out of it and started feeling a million times better.     OBJECTIVE DATA SUMMARY:   Patient mobilized on continuous portable monitor/telemetry.   Critical Behavior:  Neurologic State: Alert, Appropriate for age  Orientation Level: Oriented X4  Cognition: Appropriate for age attention/concentration, Follows commands  Safety/Judgement: Fall prevention    Functional Mobility Training:  Bed Mobility:  Rolling:  (sitting in chair )        Scooting: Supervision          Transfers:  Sit to Stand: Supervision;Modified independent  Stand to Sit: Modified independent        Bed to Chair: Supervision Balance:  Sitting: Intact; Without support  Standing: Intact; Without support    Ambulation/Gait Training:  Distance (ft): 400 Feet (ft)  Assistive Device:  (none)  Ambulation - Level of Assistance: Supervision        Gait Abnormalities: Decreased step clearance              Speed/Floresita: Pace decreased (<100 feet/min)  Step Length: Right shortened;Left shortened                   Stairs:  Number of Stairs Trained: 12  Stairs - Level of Assistance: Stand-by assistance  Rail Use: Left     Cardiac diagnosis intervention:  Patient instructed and educated on mindful movement principles based on Move in The Tube concept to include maintaining bilateral elbows close to rib cage when performing any load-bearing activity such as getting in/out of bed, pushing up from a chair, opening a door, or lifting a box. Patient was given a handout with diagrams of each correct/incorrect method of performing each of the above tasks. Therapeutic Exercises:   Patient instructed on the benefits and demonstrated cardiac exercises while standing with Supervision. Instructed and indicated understanding on how to progress reps, sets against gravity, pacing through progressive muscle strengthening standing based on surgeon clearance for more weight in prep for basic and instrumental ADLs. Instruction on the use of household items in place of weights as needed.      CARDIAC  EXERCISE   Sets   Reps   Active Active Assist   Passive Self ROM   Comments   Shoulder flexion 1 10 [x]                                            []                                            []                                            []                                               Shoulder abduction 1 10 [x]                                            []                                            []                                            []                                               Scapular elevation 1 10 [x] []                                            []                                            []                                               Scapular retraction 1 10 [x]                                            []                                            []                                            []                                               Trunk rotation 1 10 [x]                                            []                                            []                                            []                                               Trunk sidebending 1 10 [x]                                            []                                            []                                            []                                                  []                                            []                                            []                                            []                                                     Activity Tolerance:   Good, SpO2 stable on RA and requires rest breaks    After treatment patient left in no apparent distress:   Sitting in chair, Call bell within reach and Caregiver / family present    COMMUNICATION/COLLABORATION:   The patients plan of care was discussed with: Occupational therapist, Registered nurse and Case management.      Kali Huynh PT, DPT   Time Calculation: 23 mins

## 2021-08-05 NOTE — PROGRESS NOTES
\[facility]\"" ICU Progress Note    Admit Date: 2021  POD:  4 Day Post-Op    Procedure:  Procedure(s):  LIEN AND EPIAORTIC ULTRASOUND BY DR Dominick Johnson - ON PUMP CORONARY ARTERY BYPASS GRAFT X 2 WITH LEFT INTERNAL MAMMARY ARTERY AND GREATER LEFT SAPHENOUS VEIN GRAFTING AND EVH        Subjective:   Pt seen with Dr. Silvia Lloyd. Looks great. OOB in chair, on RA. Off Levo. Ambulating well. Objective:   Vitals:  Blood pressure (!) 112/52, pulse 80, temperature 98.3 °F (36.8 °C), resp. rate 24, height 5' 1\" (1.549 m), weight 139 lb (63 kg), SpO2 98 %. Temp (24hrs), Av.6 °F (37 °C), Min:98.3 °F (36.8 °C), Max:98.8 °F (37.1 °C)    Hemodynamics:   CO: CO (l/min): 5 l/min   CI: CI (l/min/m2): 3.1 l/min/m2   CVP: CVP (mmHg): 155 mmHg (21 1030)   SVR: SVR (dyne*sec)/cm5: 1031 (dyne*sec)/cm5 (21 7611)   PAP Systolic: PAP Systolic: 43 ( 9134)   PAP Diastolic: PAP Diastolic: 19 ( 2199)   PVR:     SV02: SVO2 (%): 91 % (21 1551)   SCV02:      EKG/Rhythm:  SR    Ventilator:  Ventilator Volumes  Vt Set (ml): 350 ml (21)  Vt Exhaled (Machine Breath) (ml): 452 ml (21)  Ve Observed (l/min): 10 l/min (21)    Oxygen Therapy:  Oxygen Therapy  O2 Sat (%): 98 % (21)  Pulse via Oximetry: 80 beats per minute (21 06)  O2 Device: None (Room air) (21 0400)  O2 Flow Rate (L/min): 2 l/min (21 1930)  O2 Temperature:  (HME) (21 1549)  FIO2 (%): 40 % (21 0730)    CXR:  CXR Results  (Last 48 hours)               21 0552  XR CHEST PORT Final result    Impression:  No significant change. Narrative:  INDICATION:  Status post CABG x2. EXAM: CXR Portable. FINDINGS: Portable chest shows interval right jugular sheath removal. There is   no significant change since yesterday. There is no apparent pneumothorax. Lungs   show left greater than right basilar haziness. Heart size is stable. There is no   overt pulmonary edema. 08/04/21 0529  XR CHEST PORT Final result    Impression:  Persistent left base atelectasis or other infiltrates status post   CABG with no pneumothorax status post chest tube removal.       Narrative:  EXAM: XR CHEST PORT       INDICATION: postop heart       COMPARISON: 8/30/2021. FINDINGS: A portable AP radiograph of the chest was obtained at 05:05 hours. The   patient is on a cardiac monitor. There has been interval removal of chest and   mediastinal tubes with no pneumothorax. Right central venous introducer catheter   remains in place. There is persistent retrocardiac airspace opacity with   otherwise clear lungs. There are median sternotomy wires and surgical clips   compatible with prior CABG. The cardiac and mediastinal contours and pulmonary   vascularity are normal.  The chest wall structures and visualized upper abdomen   show no acute findings with incidental note of degenerative spine and shoulder   changes as well as diffuse osteopenia. Admission Weight: Last Weight   Weight: 139 lb (63 kg) Weight: 139 lb (63 kg)     Intake / Output / Drain:  Current Shift: No intake/output data recorded. Last 24 hrs.:     Intake/Output Summary (Last 24 hours) at 8/5/2021 0749  Last data filed at 8/5/2021 0630  Gross per 24 hour   Intake 513.31 ml   Output 1900 ml   Net -1386.69 ml       EXAM:  General:    Awake & alert                                                                                         Lungs:   Clear to auscultation bilaterally. Incision:  CDI   Heart:  SR, S2 normal, no murmur, rub or gallop. Abdomen:   Soft, non-tender. Bowel sounds hypoactive   Extremities:  No edema. Rt femoral IABP with no hematoma or bleeding. Distal Dp pulses faintly palpable. Neurologic: Awake & alert. Moves all extremities.       Labs:   Recent Labs     08/05/21  0747 08/05/21  0341 08/04/21  2106   WBC  --  6.7  --    HGB  --  7.7*  --    HCT  --  24.4*  --    PLT  --  144*  --    NA  --  144 --    K  --  4.0  --    BUN  --  12  --    CREA  --  0.71  --    GLU  --  89  --    GLUCPOC 106  --    < >    < > = values in this interval not displayed. Assessment:     Active Problems:    CAD (coronary artery disease) (2021)      S/P CABG x 2 (2021)      Overview: ON PUMP CORONARY ARTERY BYPASS GRAFT X 2 WITH LEFT INTERNAL MAMMARY ARTERY       to LAD, RSVG to OM      GREATER LEFT SAPHENOUS VEIN EVH         Plan/Recommendations/Medical Decision Makin. CAD, NSTEMI, S/P emergent CABG, EF 50%: IABP removed , cont ASA/high intensity statin. Start Toprol XL 12.5 mg due to labile BP. Will need DAPT due to NSTEMI. Start plavix. 2. Hypotension: resolved. Starting low dose BB today. Hold diuresis. 3. Post-op blood loss anemia: monitor HH  4. Acute post op respiratory insufficiency/Atelectasis: On RA, TCDB, IS hourly. Progressive ambulation. Hold diuresis today due to BP. 5. Post op thrombocytopenia: mild, cont ASA. Switch pepcid to protonix  6. HLD: high intensity statin  7. Hypothyroidism: continue Levothyroxine. 8. GERD: cont protonix  9. Constipation: +Flatus, -BM. Mobilize. Consider suppository if needed. 10. Prophylaxis/Nutrition: SQ lovenox, tolerating regular diet    Dispo: PT/OT, transfer to SDU. Home tomorrow.     Signed By: CURT Grant

## 2021-08-05 NOTE — DISCHARGE SUMMARY
[facility]   Discharge Summary     Patient ID:  Ila Botello  [id number]  61 y.o.  1955    Admit date: 8/1/2021    Discharge date: 8/6/2021     Admitting Physician: Sylwia Puckett MD     Referring Cardiologist:  Megan Donovan    PCP:  Gracy Burns MD    Admitting Diagnoses: CAD    Discharge Diagnoses:     Hospital Problems  Never Reviewed        Codes Class Noted POA    CAD (coronary artery disease) ICD-10-CM: I25.10  ICD-9-CM: 414.00  8/1/2021 Unknown        S/P CABG x 2 ICD-10-CM: Z95.1  ICD-9-CM: V45.81  8/1/2021 Unknown    Overview Signed 8/1/2021  3:32 PM by CURT Hubbard     ON PUMP CORONARY ARTERY BYPASS GRAFT X 2 WITH LEFT INTERNAL MAMMARY ARTERY to LAD, RSVG to OM  GREATER LEFT SAPHENOUS VEIN EVH                   Discharged Condition: stable    Disposition: home, see patient instructions for treatment and plan    Procedures for this admission:  Procedure(s):  LEFT AND RIGHT HEART CATH / CORONARY ANGIOGRAPHY  Ultrasound Guided Vascular Access  Intra-Aortic Balloon Pump Insertion    Discharge Medications:      My Medications      START taking these medications      Instructions Each Dose to Equal Morning Noon Evening Bedtime   acetaminophen 500 mg tablet  Commonly known as: TYLENOL    Your last dose was: Your next dose is: Take 2 Tablets by mouth every six (6) hours. 1,000 mg                 amiodarone 200 mg tablet  Commonly known as: CORDARONE    Your last dose was: Your next dose is: Take two tabs twice daily for 2 weeks. Then take 2 tabs once daily for 2 weeks and then STOP unless otherwise instructed. aspirin 81 mg chewable tablet    Your last dose was: Your next dose is: Take 1 Tablet by mouth daily. 81 mg                 atorvastatin 40 mg tablet  Commonly known as: LIPITOR    Your last dose was: Your next dose is: Take 1 Tablet by mouth daily.    40 mg                 clopidogreL 75 mg Tab  Commonly known as: PLAVIX    Your last dose was: Your next dose is: Take 1 Tablet by mouth daily. 75 mg                 metoprolol succinate 25 mg XL tablet  Commonly known as: TOPROL-XL  Start taking on: August 7, 2021    Your last dose was: Your next dose is: Take 0.5 Tablets by mouth daily. 12.5 mg                 oxyCODONE IR 5 mg immediate release tablet  Commonly known as: ROXICODONE    Your last dose was: Your next dose is: Take 1 Tablet by mouth every six (6) hours as needed for Pain for up to 7 days. Max Daily Amount: 20 mg.   5 mg                 senna-docusate 8.6-50 mg per tablet  Commonly known as: PERICOLACE    Your last dose was: Your next dose is: Take 1 Tablet by mouth daily as needed for Constipation. 1 Tablet                    CONTINUE taking these medications      Instructions Each Dose to Equal Morning Noon Evening Bedtime   GREEN TEA PO    Your last dose was: Your next dose is: Take  by mouth Every morning. levothyroxine 100 mcg tablet  Commonly known as: SYNTHROID    Your last dose was: Your next dose is: Take 100 mcg by mouth Daily (before breakfast). 100 mcg                 OTHER    Your last dose was: Your next dose is: Take  by mouth every evening.  CHAMOMILE TEA                        Where to Get Your Medications      These medications were sent to [facility]/pharmacy #[id number]- [facility] [id number] [address]    Phone: [phone]   · acetaminophen 500 mg tablet  · amiodarone 200 mg tablet  · aspirin 81 mg chewable tablet  · atorvastatin 40 mg tablet  · clopidogreL 75 mg Tab  · metoprolol succinate 25 mg XL tablet  · oxyCODONE IR 5 mg immediate release tablet  · senna-docusate 8.6-50 mg per tablet       HPI:  I was called emergently to the cardiac cath lab by Dr. Pablo Pantoja to evaluate this 74-year-old woman who was admitted with unstable angina. The patient gives a history of having persistent chest discomfort throughout the last evening associated with some numbness and jaw pain. She did not sleep well and the pain persisted, so she presented to the emergency room and had persisting chest discomfort following heparin and nitroglycerin. Her troponins were slightly elevated and she was taken emergently to the cath lab. She denies any associated shortness of breath, any history of chronic cough or fever. She has no prior history of coronary artery disease. Hospital Course: patient presented on 8/1/21 with unstable angina, underwent cardiac cath where IABP was placed due to ongoing angina and emergent CABG was performed by Dr. Pat Moody. Please refer to his separately dictated op note for complete details. Postoperatively, the patient progressed well. She was discharged and IABP removed on POD #1. Over several days, pressors were weaned. Her chest tubes & pacing wires were removed without difficulty. Ultimately, she was cleared for discharge on POD # 5 with the following plan:    POD#5:   1. CAD, NSTEMI, S/P emergent CABG, EF 50%: IABP removed 8/2, cont ASA/high intensity statin. Cont Toprol XL 12.5 mg. Need DAPT due to NSTEMI, cont plavix. 2. Hypotension: resolve. Cont low dose BB today. Hold diuresis. 3. Post-op blood loss anemia: monitor HH  4. Acute post op respiratory insufficiency/Atelectasis: On RA, TCDB, IS hourly. Progressive ambulation. 5. Post op thrombocytopenia: mild, cont ASA. Switch pepcid to protonix  6. HLD: high intensity statin  7. Hypothyroidism: continue Levothyroxine. 8. GERD: cont protonix  9. Constipation: +Flatus, -BM. Mobilize. Consider suppository if needed. 10. Prophylaxis/Nutrition: SQ lovenox, tolerating regular diet    Referral to outpatient cardiac rehab made. Appointments listed on discharge instructions.      Discharge Vital Signs:   Visit Vitals  BP (!) 102/52   Pulse 72 Temp 97.9 °F (36.6 °C)   Resp 24   Ht 5' 1\" (1.549 m)   Wt 139 lb (63 kg)   SpO2 98%   BMI 26.26 kg/m²       Labs:   Recent Labs     08/06/21  0834 08/06/21  0652 08/06/21  0429   WBC  --   --  6.1   HGB  --   --  8.6*   HCT  --   --  26.1*   PLT  --   --  217   NA  --   --  142   K  --   --  4.1   BUN  --   --  17   CREA  --   --  0.77   GLU  --   --  93   GLUCPOC 103   < >  --     < > = values in this interval not displayed. Diagnostics:   CXR Results  (Last 48 hours)               08/06/21 0609  XR CHEST PORT Final result    Impression:  Bibasilar atelectasis/effusions left greater than right is stable. Narrative:  EXAM:  XR CHEST PORT       INDICATION:  post op heart       COMPARISON:  8/5/2021       FINDINGS: A portable AP radiograph of the chest was obtained at 505 hours. The   patient is on a cardiac monitor. There is bibasilar atelectasis/effusions left   greater than right which is unchanged. [facility] Heart size is stable. .  Bony structures   are unchanged. 08/05/21 0552  XR CHEST PORT Final result    Impression:  No significant change. Narrative:  INDICATION:  Status post CABG x2. EXAM: CXR Portable. FINDINGS: Portable chest shows interval right jugular sheath removal. There is   no significant change since yesterday. There is no apparent pneumothorax. Lungs   show left greater than right basilar haziness. Heart size is stable. There is no   overt pulmonary edema. Patient Instructions/Follow Up Care:  Discharge instructions were reviewed with the patient and family present. Questions were also answered at this time. Prescriptions and medications were reviewed. Follow up appointments have been made. The patient was also instructed to follow up with her primary care physician as needed. The patient and family were encouraged to call with any questions or concerns.        Signed:  Devyn Dean NP  8/6/2021  11:38 AM

## 2021-08-05 NOTE — PROGRESS NOTES
Problem: Self Care Deficits Care Plan (Adult)  Goal: *Acute Goals and Plan of Care (Insert Text)  Description: FUNCTIONAL STATUS PRIOR TO ADMISSION: Patient was independent and active without use of DME.     HOME SUPPORT: The patient lived with family but did not require assist.    Occupational Therapy Goals  Initiated 8/3/2021  1. Patient will perform ADLs standing 5 mins without fatigue or LOB with supervision/set-up within 7 day(s). 2.  Patient will perform lower body ADLs with supervision/set-up within 7 day(s). 3.  Patient will perform gathering ADL items high and low 2/2 with supervision/set-up within 7 day(s). 4.  Patient will perform toilet transfers with supervision/set-up within 7 day(s). 5.  Patient will perform all aspects of toileting with supervision/set-up within 7 day(s). 6.  Patient will participate in cardiac/sternal upper extremity therapeutic exercise/activities to increase independence with ADLs with supervision/set-up for 5 minutes within 7 day(s). Outcome: Progressing Towards Goal   OCCUPATIONAL THERAPY TREATMENT  Patient: Tiburcio Schwab (23 y.o. female)  Date: 8/5/2021  Diagnosis: CAD (coronary artery disease) [I25.10] <principal problem not specified>  Procedure(s) (LRB):  LEFT AND RIGHT HEART CATH / CORONARY ANGIOGRAPHY (N/A)  Ultrasound Guided Vascular Access (N/A)  Intra-Aortic Balloon Pump Insertion (N/A) 4 Days Post-Op  Precautions: Sternal (move in the tube)  Chart, occupational therapy assessment, plan of care, and goals were reviewed. ASSESSMENT  Patient continues with skilled OT services and is progressing towards goals. Pt was sitting up in chair when OT arrived on RA, and stated that she was feeling so much better today. She was able to stand with SBA to supervision from chair and performed cardiac ex in standing with only VC. Pt was able to tailor sit and doff and jasiel his socks and educated on donning button up and pull over shirt.   She was able to stand at sink to wash her face and had brushed her teeth and combed her hair. She was SBA for transfers to toilet and able to toilet self with no assist.  Pt is moving very well and making good progress. She needed VC for move in the tube/ sternal precautions. Pt was left sitting up in chair with family in room. Current Level of Function Impacting Discharge (ADLs): setup to supervision for ADLs            PLAN :  Patient continues to benefit from skilled intervention to address the above impairments. Continue treatment per established plan of care to address goals. Recommend with staff: Holly Fields for meals and walk to toilet     Recommend next OT session: work on standing at sink for bathing at kary area and buttocks    Recommendation for discharge: (in order for the patient to meet his/her long term goals)  No skilled occupational therapy/ follow up rehabilitation needs identified at this time. This discharge recommendation:  Has been made in collaboration with the attending provider and/or case management    IF patient discharges home will need the following DME: none       SUBJECTIVE:   Patient stated Kaylynn Nathan started to feel much better last night around 10pm..    OBJECTIVE DATA SUMMARY:   Cognitive/Behavioral Status:  Neurologic State: Alert  Orientation Level: Oriented X4  Cognition: Follows commands  Perception: Appears intact  Perseveration: No perseveration noted       Functional Mobility and Transfers for ADLs:  Bed Mobility:  Rolling:  (sitting in chair )  Scooting: Supervision    Transfers:  Sit to Stand: Supervision;Modified independent  Functional Transfers  Bathroom Mobility: Supervision/set up  Toilet Transfer : Supervision  Bed to Chair: Supervision    Balance:  Sitting: Intact; Without support  Standing: Intact; Without support  Standing - Static: Good;Constant support  Standing - Dynamic : Good;Constant support    ADL Intervention:  Feeding  Feeding Assistance: Independent    Grooming  Grooming Assistance: Supervision  Position Performed: Standing  Washing Face: Supervision  Washing Hands: Supervision  Brushing Teeth: Supervision    Upper Body Bathing  Bathing Assistance: Supervision  Position Performed: Standing    Lower Body Bathing  Bathing Assistance: Supervision  Perineal  : Supervision  Position Performed: Standing              Toileting  Toileting Assistance: Supervision  Bladder Hygiene: Supervision  Bowel Hygiene: Supervision         Increase activity tolerance for home, work, and sexual intercourse by pacing self with increasing the arm exercises, sitting duration, frequency OOB, walking, standing, and ADLs. Instructed and indicated understanding of s/s of too much activity, how to respond to s/s safely. Therapeutic Exercises:   Patient instructed on the benefits and demonstrated cardiac exercises while standing  with Supervision. Instructed and indicated understanding on how to progress reps, sets against gravity, weights, standing and so on based on cardiologist clearance for more weight in prep for basic and instrumental ADLs. Instruction on the use of household items in place of weights as needed.     CARDIAC   EXERCISE    Sets    Reps    Active  Active Assist    Passive  Self ROM    Comments    Shoulder flexion  1  5  [x]                            []                             []                             []                         Shoulder abduction  1  5  [x]                             []                             []                             []                                Scapular retraction  1  5  [x]                             []                             []                             []                                  Pain:  none    Activity Tolerance:   Good    After treatment patient left in no apparent distress:   Sitting in chair, Call bell within reach, and Caregiver / family present    COMMUNICATION/COLLABORATION:   The patients plan of care was discussed with: Physical therapist and Registered nurse.      Debbie Erickson, OT  Time Calculation: 28 mins

## 2021-08-05 NOTE — PROGRESS NOTES
End of Shift Note    Bedside shift change report given to Reyna Whalen RN (oncoming nurse) by Flaquito Herrera RN (offgoing nurse). Report included the following information SBAR, Kardex, ED Summary, OR Summary, Procedure Summary, Intake/Output, MAR, Accordion, Recent Results, Alarm Parameters , Pre Procedure Checklist, Procedure Verification and Quality Measures    Shift worked:  7a-7p     Shift summary and any significant changes:    4574 Report from [facility]    3521 Full systems assessment complete. See flowsheet for details. 1105 Full systems reassessment complete. No changes from prior assessment, see flowsheet for details. 1436 Full systems reassessment complete. No changes from prior assessment, see flowsheet for details. Concerns for physician to address:  n/a     Zone phone for oncoming shift:   n/a       Activity:  Activity Level: Up with Assistance  Number times ambulated in hallways past shift: 1  Number of times OOB to chair past shift: 3    Cardiac:   Cardiac Monitoring: Yes      Cardiac Rhythm: Sinus Rhythm    Access:   Current line(s): PIV     Genitourinary:   Urinary status: voiding    Respiratory:   O2 Device: None (Room air)  Chronic home O2 use?: NO  Incentive spirometer at bedside: YES  Actual Volume (ml): 500 ml  GI:     Current diet:  ADULT DIET Regular; Low Fat/Low Chol/High Fiber/2 gm Na; GLUTEN FREE  Passing flatus: YES  Tolerating current diet: YES       Pain Management:   Patient states pain is manageable on current regimen: YES    Skin:  Live Score: 20  Interventions: speciality bed, float heels, increase time out of bed, foam dressing, PT/OT consult, limit briefs, internal/external urinary devices and nutritional support     Patient Safety:  Fall Score:  Total Score: 1  Interventions: bed/chair alarm, assistive device (walker, cane, etc), gripper socks and pt to call before getting OOB  High Fall Risk: Yes    Length of Stay:  Expected LOS: 7d 16h  Actual LOS: Flavio Salazar RN

## 2021-08-05 NOTE — PROGRESS NOTES
1915: Received report from offgoing shift. 2000: Medicated with Tylenol for C/O HA.  2059: Levophed drip stopped. 2100: Removed sternal dressing and cleaned site with Dial soap and water, followed by CHG swab. Site C/D/I. Patient tolerated well. 0410: Left AC 20 g with good blood return. Discontinued MAC. Patient tolerated well.

## 2021-08-05 NOTE — INTERDISCIPLINARY ROUNDS
Critical care interdisciplinary rounds held on 08/05/2021. Following members present, Pharmacy, Diabetes Treatment, Case Management, Physical Therapy and Nutrition. Led by ANDREAS Li RN and Dr. Maritza Forbes. Plan of care discussed. See clinical pathway for plan of care and interventions and desired outcomes.

## 2021-08-06 ENCOUNTER — APPOINTMENT (OUTPATIENT)
Dept: GENERAL RADIOLOGY | Age: 66
DRG: 234 | End: 2021-08-06
Attending: PHYSICIAN ASSISTANT
Payer: COMMERCIAL

## 2021-08-06 VITALS
RESPIRATION RATE: 24 BRPM | DIASTOLIC BLOOD PRESSURE: 52 MMHG | TEMPERATURE: 97.9 F | HEART RATE: 72 BPM | HEIGHT: 61 IN | OXYGEN SATURATION: 98 % | WEIGHT: 139 LBS | BODY MASS INDEX: 26.24 KG/M2 | SYSTOLIC BLOOD PRESSURE: 102 MMHG

## 2021-08-06 LAB
ALBUMIN SERPL-MCNC: 3.5 G/DL (ref 3.5–5)
ALBUMIN/GLOB SERPL: 1.1 {RATIO} (ref 1.1–2.2)
ALP SERPL-CCNC: 62 U/L (ref 45–117)
ALT SERPL-CCNC: 20 U/L (ref 12–78)
ANION GAP SERPL CALC-SCNC: 5 MMOL/L (ref 5–15)
AST SERPL-CCNC: 22 U/L (ref 15–37)
BILIRUB SERPL-MCNC: 0.4 MG/DL (ref 0.2–1)
BUN SERPL-MCNC: 17 MG/DL (ref 6–20)
BUN/CREAT SERPL: 22 (ref 12–20)
CALCIUM SERPL-MCNC: 8.6 MG/DL (ref 8.5–10.1)
CHLORIDE SERPL-SCNC: 110 MMOL/L (ref 97–108)
CO2 SERPL-SCNC: 27 MMOL/L (ref 21–32)
CREAT SERPL-MCNC: 0.77 MG/DL (ref 0.55–1.02)
ERYTHROCYTE [DISTWIDTH] IN BLOOD BY AUTOMATED COUNT: 13.6 % (ref 11.5–14.5)
GLOBULIN SER CALC-MCNC: 3.1 G/DL (ref 2–4)
GLUCOSE BLD STRIP.AUTO-MCNC: 103 MG/DL (ref 65–117)
GLUCOSE BLD STRIP.AUTO-MCNC: 97 MG/DL (ref 65–117)
GLUCOSE SERPL-MCNC: 93 MG/DL (ref 65–100)
HCT VFR BLD AUTO: 26.1 % (ref 35–47)
HGB BLD-MCNC: 8.6 G/DL (ref 11.5–16)
MCH RBC QN AUTO: 31.5 PG (ref 26–34)
MCHC RBC AUTO-ENTMCNC: 33 G/DL (ref 30–36.5)
MCV RBC AUTO: 95.6 FL (ref 80–99)
NRBC # BLD: 0 K/UL (ref 0–0.01)
NRBC BLD-RTO: 0 PER 100 WBC
PLATELET # BLD AUTO: 217 K/UL (ref 150–400)
PMV BLD AUTO: 10.6 FL (ref 8.9–12.9)
POTASSIUM SERPL-SCNC: 4.1 MMOL/L (ref 3.5–5.1)
PROT SERPL-MCNC: 6.6 G/DL (ref 6.4–8.2)
RBC # BLD AUTO: 2.73 M/UL (ref 3.8–5.2)
SERVICE CMNT-IMP: NORMAL
SERVICE CMNT-IMP: NORMAL
SODIUM SERPL-SCNC: 142 MMOL/L (ref 136–145)
WBC # BLD AUTO: 6.1 K/UL (ref 3.6–11)

## 2021-08-06 PROCEDURE — 74011250637 HC RX REV CODE- 250/637: Performed by: PHYSICIAN ASSISTANT

## 2021-08-06 PROCEDURE — 82962 GLUCOSE BLOOD TEST: CPT

## 2021-08-06 PROCEDURE — 74011000250 HC RX REV CODE- 250: Performed by: THORACIC SURGERY (CARDIOTHORACIC VASCULAR SURGERY)

## 2021-08-06 PROCEDURE — 74011250636 HC RX REV CODE- 250/636: Performed by: PHYSICIAN ASSISTANT

## 2021-08-06 PROCEDURE — 74011250637 HC RX REV CODE- 250/637: Performed by: THORACIC SURGERY (CARDIOTHORACIC VASCULAR SURGERY)

## 2021-08-06 PROCEDURE — 36415 COLL VENOUS BLD VENIPUNCTURE: CPT

## 2021-08-06 PROCEDURE — 71045 X-RAY EXAM CHEST 1 VIEW: CPT

## 2021-08-06 PROCEDURE — 85027 COMPLETE CBC AUTOMATED: CPT

## 2021-08-06 PROCEDURE — 80053 COMPREHEN METABOLIC PANEL: CPT

## 2021-08-06 RX ORDER — METOPROLOL SUCCINATE 25 MG/1
12.5 TABLET, EXTENDED RELEASE ORAL DAILY
Qty: 30 TABLET | Refills: 1 | Status: SHIPPED | OUTPATIENT
Start: 2021-08-07

## 2021-08-06 RX ADMIN — LEVOTHYROXINE SODIUM 100 MCG: 0.1 TABLET ORAL at 06:53

## 2021-08-06 RX ADMIN — MAGNESIUM OXIDE TAB 400 MG (241.3 MG ELEMENTAL MG) 400 MG: 400 (241.3 MG) TAB at 08:42

## 2021-08-06 RX ADMIN — CHLORHEXIDINE GLUCONATE 0.12% ORAL RINSE 10 ML: 1.2 LIQUID ORAL at 08:45

## 2021-08-06 RX ADMIN — DOCUSATE SODIUM AND SENNOSIDES 1 TABLET: 8.6; 5 TABLET, FILM COATED ORAL at 08:44

## 2021-08-06 RX ADMIN — MUPIROCIN: 20 OINTMENT TOPICAL at 08:45

## 2021-08-06 RX ADMIN — AMIODARONE HYDROCHLORIDE 400 MG: 200 TABLET ORAL at 08:44

## 2021-08-06 RX ADMIN — GABAPENTIN 300 MG: 300 CAPSULE ORAL at 06:53

## 2021-08-06 RX ADMIN — Medication 10 ML: at 06:53

## 2021-08-06 RX ADMIN — METOPROLOL SUCCINATE 12.5 MG: 25 TABLET, EXTENDED RELEASE ORAL at 08:42

## 2021-08-06 RX ADMIN — POLYETHYLENE GLYCOL 3350 17 G: 17 POWDER, FOR SOLUTION ORAL at 08:44

## 2021-08-06 RX ADMIN — PANTOPRAZOLE SODIUM 40 MG: 40 TABLET, DELAYED RELEASE ORAL at 06:53

## 2021-08-06 RX ADMIN — CLOPIDOGREL BISULFATE 75 MG: 75 TABLET, FILM COATED ORAL at 08:44

## 2021-08-06 RX ADMIN — ENOXAPARIN SODIUM 40 MG: 40 INJECTION SUBCUTANEOUS at 08:44

## 2021-08-06 RX ADMIN — ACETAMINOPHEN 1000 MG: 500 TABLET ORAL at 06:53

## 2021-08-06 RX ADMIN — ATORVASTATIN CALCIUM 40 MG: 40 TABLET, FILM COATED ORAL at 08:44

## 2021-08-06 RX ADMIN — ASPIRIN 81 MG: 81 TABLET, CHEWABLE ORAL at 08:43

## 2021-08-06 NOTE — PROCEDURES
0900 - Pt rested back in recliner chair. AV wire site cleansed with CHG prep. A and V wires cut per protocol. Pt educated. RN updated. VSS.

## 2021-08-06 NOTE — PROGRESS NOTES
Transition of Care Plan:    RUR: 14%  Disposition: Home w/ spouse and [address]  Follow up appointments: Card Surg  DME needed: none  Transportation at Discharge: spouse  [address] or means to access home:      Yes - per spouse  IM Medicare Letter: N/A  Is patient a BCPI-A Bundle:       No    If yes, was Bundle Letter given?:     Caregiver Contact: Spouse - Ray Yuan -  - [phone]  Discharge Caregiver contacted prior to discharge? Yes          Patient at home and comfortable. [address] to see patient. Care Management Interventions  PCP Verified by CM: Yes  Palliative Care Criteria Met (RRAT>21 & CHF Dx)?: No  Mode of Transport at Discharge:  Other (see comment)  Transition of Care Consult (CM Consult): Discharge Planning  Discharge Durable Medical Equipment: No  Physical Therapy Consult: Yes  Occupational Therapy Consult: Yes  Speech Therapy Consult: No  Current Support Network: Lives with Spouse  Confirm Follow Up Transport: Family  Discharge Location  Discharge Placement: Home with home health    Arcelia Hartman RN, BSN, [facility]    Coordinator  [phone]

## 2021-08-06 NOTE — PROGRESS NOTES
Pt received discharge instructions and prescriptions. Pt states understanding of follow-up care ans side effects of medications. Pt given opportunity for questions and clarifications. IV removed. Pt has all belongings.  Petra Newton RN

## 2021-08-06 NOTE — ROUTINE PROCESS
Shift summary  Received patient alert and oriented x4 on room air,with no signs of distress noted. surgical incision site looks clean dry and intact with no signs of infection,up o the bathroom x3 with minimal assistance and is up to the chair this AM.Bedside, Verbal and Written shift change report given to [address] [place] [address] (oncoming nurse) by Anita Boxer, RN   (offgoing nurse). Report included the following information SBAR, Kardex and MAR.

## 2021-08-06 NOTE — PROGRESS NOTES
Spiritual Care Assessment/Progress Note  [facility]      NAME: Sarah Ewing      MRN: [id number]  AGE: 63 y.o. SEX: female  Confucianist Affiliation: No Nondenominational   Language: English     8/6/2021     Total Time (in minutes): 10     Spiritual Assessment begun in [facility] 1 through conversation with:         [x]Patient        [x] Family    [] Friend(s)        Reason for Consult: Initial/Spiritual assessment, patient floor     Spiritual beliefs: (Please include comment if needed)     [x] Identifies with a sandra tradition: Kimberlee        [] Supported by a sandra community:            [] Claims no spiritual orientation:           [] Seeking spiritual identity:                [] Adheres to an individual form of spirituality:           [] Not able to assess:                           Identified resources for coping:      [] Prayer                               [] Music                  [] Guided Imagery     [x] Family/friends                 [] Pet visits     [] Devotional reading                         [] Unknown     [] Other:                                               Interventions offered during this visit: (See comments for more details)    Patient Interventions: Affirmation of emotions/emotional suffering, Initial visit, Initial/Spiritual assessment, patient floor, Coping skills reviewed/reinforced, Catharsis/review of pertinent events in supportive environment, Prayer (actual), Prayer (assurance of)     Family/Friend(s):  Affirmation of emotions/emotional suffering, Initial Assessment, Prayer (actual), Prayer (assurance of), Coping skills reviewed/reinforced     Plan of Care:     [] Support spiritual and/or cultural needs    [] Support AMD and/or advance care planning process      [] Support grieving process   [] Coordinate Rites and/or Rituals    [] Coordination with community clergy   [x] No spiritual needs identified at this time   [] Detailed Plan of Care below (See Comments) [] Make referral to Music Therapy  [] Make referral to Pet Therapy     [] Make referral to Addiction services  [] Make referral to [facility]  [] Make referral to Spiritual Care Partner  [] No future visits requested        [] Follow up upon further referrals     Comments:     Initial Spiritual Care Assessment visit for Ms. Ralph Jones in 2546. Patient was alert, her  Mr. Ralph Jones was present. They were ready for the discharge,  celebrated her healing and recovery.  ended the visit with prayer. They were thankful. Advised of  availability.       [address] Sagar Ahn., M.S., Th.M.  Spiritual Care Provider   Paging Service [phone])

## 2021-08-06 NOTE — DISCHARGE INSTRUCTIONS
[facility]     [address]  [address]                                                           [address], [address] [facility]  Office- [phone]  Fax- [phone]        Office- [phone]  Fax- [phone]  _________________________________________________________________  Dr. Nithya Washington, NP          Candace Melton, [place]  Dr. Mey Montana, KITTY Cruz Se, CURT Bob, KITTY Carson [place]                                                  Sayda Chu, KITTY Whalen, CURT Abreu, KITTY Landrum, KITTY                                                            Name:Renetta Barrett     Surgery & Date: Procedure(s):  LEFT AND RIGHT HEART CATH / CORONARY ANGIOGRAPHY  Ultrasound Guided Vascular Access  Intra-Aortic Balloon Pump Insertion    Discharge Date: 8/6/21    MEDICATIONS:  Please refer to your After Visit Summary for your medication list. If you do not have a prescription for a new medication, you may purchase the medication over the counter. DO NOT TAKE ANY MEDICATIONS THAT ARE NOT ON THIS LIST    INSTRUCTIONS:  1. NO SMOKING OR TOBACCO PRODUCTS  2. Follow all the instructions in your discharge book  3. Shower daily. Wash all incisions twice daily with Dial soap and water. No lotions, ointments or powder. After each wash with soap and water, dry incisions. Wipe incisions with Dynahex 4 solution and let air dry. Do this for 14 days. 4. Call the office immediately for any redness, swelling, or drainage from your incision.    5. Take your temperature daily and call for a temperature of 101 degrees or higher or for any symptoms that make you think you have and infection. 6. Weigh yourself each morning. Call if you gain more than 5 pounds in 48 hours. 7. Use the incentive spirometer 6-8 times a day-10 breaths each time. Use a pillow or your bear to splint your breastbone when coughing or sneezing. 8. If you feel your breast bone clicking or popping, notify the office immediately. 9. Walk several hundred feet several times daily. DIET  Eat an American Heart Association diet. If you are having trouble with your appetite, eat what you can. Try eating small, frequent meals throughout the day. Diabetic patients should follow an ADA diet. Check your blood sugars  And keep a log of your results. ACTIVITY  1. NO DRIVING--you will be evaluated to drive at your follow up visit. 2. Increase your activity by walking several times a day. Stay out of bed most of the day. 3. When sitting, keep your legs elevated. 4. You may ride in a car, but you must get out every hour and walk around. If you ride in a car with an airbag that can not be switched off, put the seat ALL the way back or ride in the back seat. 5. Any load bearing activity can be performed as long as it can be performed \"in the tube\". You can reach \"out of the tube\" when performing activities that don't require heavy lifting. Let pain be your guide, your pain level will keep you from doing anything extreme. Normal Problems After Discharge:   Some fatigue and difficulty sleeping   Poor appetite initially, with temporary change in taste   Temperature variability; feeling hot and cold   Chest wall soreness and paresthesia (numbness)   Some musculoskeletal pain along joint lines in chest and back.     Tylenol or NSAIDs will help unless contraindicated    Patients with EVH (Endoscopic Vein Kountze) will have mild swelling in that leg due to temporary venous insufficiency   Elevation & compression stockings will help to reduce the swelling        FOLLOW UP  1. Your first follow up appointment will be on 8/13/21 at 11:00am. Our office is located in [address] [place] [address], [address]. Your second follow up appointment will be in four weeks, on 9/1/21 at 2:30pm.. Please call our office at [phone] if you are unable to make either one of these appointments. 2. You will be receiving a call before your 5 day appointment to begin cardiac rehab. They are located in the [address] [place] [address], [address]. Their phone number is [phone]. Please call if you have not been contacted 2-3 weeks after discharge from the hospital.  3. You will see Dr. Dilshad Weiner on 9/15/21 at 1:00pm.   4. Your initial Cardiac Rehab Program intake appointment scheduled for 9/7/21 @10:30am  5. Consult you primary care physician regarding your influenza & pneumovax vaccines. 6. Please bring all medications (or a complete list) with you to your appointment. 7. Do not hesitate to contact our office 24/7 with any questions or concerns.  Call the number on your red bracelet ([phone])      Signature:___________________________________________________

## 2021-08-06 NOTE — PROGRESS NOTES
[facility] ICU Progress Note    Admit Date: 2021  POD:  5 Day Post-Op    Procedure:  Procedure(s):  LIEN AND EPIAORTIC ULTRASOUND BY DR Teresita Jain - ON PUMP CORONARY ARTERY BYPASS GRAFT X 2 WITH LEFT INTERNAL MAMMARY ARTERY AND GREATER LEFT SAPHENOUS VEIN GRAFTING AND EVH        Subjective:   Pt seen with Dr. Ashley Macias. Looks great. OOB in chair, on RA. Ready to go home     Objective:   Vitals:  Blood pressure (!) 102/52, pulse 72, temperature 97.9 °F (36.6 °C), resp. rate 24, height 5' 1\" (1.549 m), weight 139 lb (63 kg), SpO2 98 %. Temp (24hrs), Av.1 °F (36.7 °C), Min:97.7 °F (36.5 °C), Max:99 °F (37.2 °C)    EKG/Rhythm:  SR    Oxygen Therapy: RA    CXR:  CXR Results  (Last 48 hours)               21 0609  XR CHEST PORT Final result    Impression:  Bibasilar atelectasis/effusions left greater than right is stable. Narrative:  EXAM:  XR CHEST PORT       INDICATION:  post op heart       COMPARISON:  2021       FINDINGS: A portable AP radiograph of the chest was obtained at 505 hours. The   patient is on a cardiac monitor. There is bibasilar atelectasis/effusions left   greater than right which is unchanged. P[place]co Rich Heart size is stable. .  Bony structures   are unchanged. 21 0552  XR CHEST PORT Final result    Impression:  No significant change. Narrative:  INDICATION:  Status post CABG x2. EXAM: CXR Portable. FINDINGS: Portable chest shows interval right jugular sheath removal. There is   no significant change since yesterday. There is no apparent pneumothorax. Lungs   show left greater than right basilar haziness. Heart size is stable. There is no   overt pulmonary edema.                  Admission Weight: Last Weight   Weight: 139 lb (63 kg) Weight: 139 lb (63 kg)     Intake / Output / Drain:  Current Shift: 701 - 1900  In: 650 [P.O.:650]  Out: -   Last 24 hrs.:     Intake/Output Summary (Last 24 hours) at 2021 3023  Last data filed at 2021 0030  Gross per 24 hour   Intake 870 ml   Output 400 ml   Net 470 ml       EXAM:  General:    Awake & alert                                                                                         Lungs:   Clear to auscultation bilaterally. Incision:  ERNST, No swelling, erythema, or drainage noted. Slightly ecchymosis   Heart:  Regular rate and rhythm, S2 normal, no murmur, rub or gallop. Abdomen:   Soft, non-tender. Bowel sounds hypoactive   Extremities:  No edema. Distal Dp pulses faintly palpable. Neurologic: Awake & alert. Moves all extremities. Labs:   Recent Labs     21  0834 21  0652 21  0429   WBC  --   --  6.1   HGB  --   --  8.6*   HCT  --   --  26.1*   PLT  --   --  217   NA  --   --  142   K  --   --  4.1   BUN  --   --  17   CREA  --   --  0.77   GLU  --   --  93   GLUCPOC 103   < >  --     < > = values in this interval not displayed. Assessment:     Active Problems:    CAD (coronary artery disease) (2021)      S/P CABG x 2 (2021)      Overview: ON PUMP CORONARY ARTERY BYPASS GRAFT X 2 WITH LEFT INTERNAL MAMMARY ARTERY       to LAD, RSVG to OM      GREATER LEFT SAPHENOUS VEIN EVH         Plan/Recommendations/Medical Decision Makin. CAD, NSTEMI, S/P emergent CABG, EF 50%: IABP removed , cont ASA/high intensity statin. Cont Toprol XL 12.5 mg. Need DAPT due to NSTEMI, cont plavix. 2. Hypotension: resolve. Cont low dose BB today. Hold diuresis. 3. Post-op blood loss anemia: monitor HH  4. Acute post op respiratory insufficiency/Atelectasis: On RA, TCDB, IS hourly. Progressive ambulation. 5. Post op thrombocytopenia: mild, cont ASA. Switch pepcid to protonix  6. HLD: high intensity statin  7. Hypothyroidism: continue Levothyroxine. 8. GERD: cont protonix  9. Constipation: +Flatus, -BM. Mobilize. Consider suppository if needed. 10. Prophylaxis/Nutrition: SQ lovenox, tolerating regular diet    Dispo: PT/OT, Cut AV wires. D/c home today.  Case management following to aid in discharge planning. May need home PT per therapy notes.      Signed By: Susi Engle NP

## 2021-08-07 ENCOUNTER — HOME CARE VISIT (OUTPATIENT)
Dept: SCHEDULING | Facility: HOME HEALTH | Age: 66
End: 2021-08-07
Payer: COMMERCIAL

## 2021-08-07 PROCEDURE — G0299 HHS/HOSPICE OF RN EA 15 MIN: HCPCS

## 2021-08-07 PROCEDURE — 400013 HH SOC

## 2021-08-08 VITALS
TEMPERATURE: 98.3 F | HEART RATE: 84 BPM | DIASTOLIC BLOOD PRESSURE: 60 MMHG | RESPIRATION RATE: 20 BRPM | SYSTOLIC BLOOD PRESSURE: 118 MMHG | OXYGEN SATURATION: 98 %

## 2021-08-09 ENCOUNTER — HOME CARE VISIT (OUTPATIENT)
Dept: SCHEDULING | Facility: HOME HEALTH | Age: 66
End: 2021-08-09
Payer: COMMERCIAL

## 2021-08-09 PROCEDURE — G0300 HHS/HOSPICE OF LPN EA 15 MIN: HCPCS

## 2021-08-09 RX ORDER — ONDANSETRON 8 MG/1
4 TABLET, ORALLY DISINTEGRATING ORAL
Qty: 8 TABLET | Refills: 0 | Status: SHIPPED | OUTPATIENT
Start: 2021-08-09

## 2021-08-09 NOTE — PROGRESS NOTES
HH called reporting pt is experiencing some nausea, which she is associating with her tylenol. Encourage pt to take Rx after eating. Last BM was this am. Will order a few doses of zofran PRN to see if this helps.

## 2021-08-10 VITALS
BODY MASS INDEX: 25.96 KG/M2 | RESPIRATION RATE: 17 BRPM | SYSTOLIC BLOOD PRESSURE: 118 MMHG | HEART RATE: 71 BPM | WEIGHT: 137.4 LBS | OXYGEN SATURATION: 99 % | TEMPERATURE: 97.4 F | DIASTOLIC BLOOD PRESSURE: 64 MMHG

## 2021-08-11 ENCOUNTER — HOME CARE VISIT (OUTPATIENT)
Dept: SCHEDULING | Facility: HOME HEALTH | Age: 66
End: 2021-08-11
Payer: COMMERCIAL

## 2021-08-11 VITALS
BODY MASS INDEX: 25.58 KG/M2 | OXYGEN SATURATION: 98 % | SYSTOLIC BLOOD PRESSURE: 121 MMHG | DIASTOLIC BLOOD PRESSURE: 58 MMHG | RESPIRATION RATE: 17 BRPM | TEMPERATURE: 97.6 F | WEIGHT: 135.4 LBS | HEART RATE: 66 BPM

## 2021-08-11 PROCEDURE — G0300 HHS/HOSPICE OF LPN EA 15 MIN: HCPCS

## 2021-08-13 ENCOUNTER — OFFICE VISIT (OUTPATIENT)
Dept: CARDIOTHORACIC SURGERY | Age: 66
End: 2021-08-13
Payer: COMMERCIAL

## 2021-08-13 DIAGNOSIS — Z95.1 S/P CABG X 2: Primary | ICD-10-CM

## 2021-08-13 PROCEDURE — 99024 POSTOP FOLLOW-UP VISIT: CPT | Performed by: PHYSICIAN ASSISTANT

## 2021-08-13 NOTE — PROGRESS NOTES
Patient: Mony Gao   Age: 63 y.o. Patient Care Team:  Jerrell Causey MD as PCP - General (Family Medicine)  Bard Doris MD (Cardiology)    Diagnosis: There were no encounter diagnoses. Problem List:   Patient Active Problem List   Diagnosis Code    CAD (coronary artery disease) I25.10    S/P CABG x 2 Z95.1        Date of Surgery: 8/1/21    Surgery: CABG x2    HPI:  Pt is here for post op follow up. No c/o pain, SOB. Eating well. Moving bowels well. Walking in house for exercise due to the weather. No incisional c/o    Current Medications:   Current Outpatient Medications   Medication Sig Dispense Refill    ondansetron (ZOFRAN ODT) 8 mg disintegrating tablet Take 0.5 Tablets by mouth every eight (8) hours as needed for Nausea or Vomiting. 8 Tablet 0    polyethylene glycol (Miralax) 17 gram/dose powder Take 17 g by mouth two (2) times daily as needed for Constipation. take if no bowel movment within 5 days      metoprolol succinate (TOPROL-XL) 25 mg XL tablet Take 0.5 Tablets by mouth daily. 30 Tablet 1    acetaminophen (TYLENOL) 500 mg tablet Take 2 Tablets by mouth every six (6) hours. 30 Tablet 2    amiodarone (CORDARONE) 200 mg tablet Take two tabs twice daily for 2 weeks. Then take 2 tabs once daily for 2 weeks and then STOP unless otherwise instructed. (Patient taking differently: Take  by mouth. Take two tabs twice daily for 2 weeks. Then take 2 tabs once daily for 2 weeks and then STOP unless otherwise instructed.) 84 Tablet 0    aspirin 81 mg chewable tablet Take 1 Tablet by mouth daily. 30 Tablet 2    atorvastatin (LIPITOR) 40 mg tablet Take 1 Tablet by mouth daily. 30 Tablet 2    clopidogreL (PLAVIX) 75 mg tab Take 1 Tablet by mouth daily. 30 Tablet 2    senna-docusate (PERICOLACE) 8.6-50 mg per tablet Take 1 Tablet by mouth daily as needed for Constipation. (Patient taking differently: Take 1 Tablet by mouth daily as needed for Constipation.  take if no bowel movement within 3 days ) 30 Tablet 1    levothyroxine (SYNTHROID) 100 mcg tablet Take 100 mcg by mouth Daily (before breakfast).  green tea leaf extract (GREEN TEA PO) Take  by mouth Every morning.  OTHER Take  by mouth every evening. CHAMOMILE TEA         Vitals: There were no vitals taken for this visit. Allergies: has No Known Allergies. Physical Exam:  Wounds: sternal and LLE incisions healing well. No redness or drainage    Lungs: clear to auscultation    Heart: Reg rate & rhythm. No murmur or rub    Extremities: no edema. LLE surgical incision healing well    Assessment/Plan:   1. Doing well post CABG. Continue sternal precautions. Continue walking for exercise. No lifting over 5#, no driving or sitting in front of an airbag yet. RTC in 3 weeks    Pt is ready to start cardiac rehab.      Rehab - yes  Walking: yes  Glucometer: n/a  Antibiotic card for valves: n/a

## 2021-08-14 ENCOUNTER — HOME CARE VISIT (OUTPATIENT)
Dept: SCHEDULING | Facility: HOME HEALTH | Age: 66
End: 2021-08-14
Payer: COMMERCIAL

## 2021-08-14 VITALS
BODY MASS INDEX: 25.45 KG/M2 | TEMPERATURE: 97.8 F | SYSTOLIC BLOOD PRESSURE: 110 MMHG | OXYGEN SATURATION: 100 % | HEART RATE: 69 BPM | WEIGHT: 134.7 LBS | DIASTOLIC BLOOD PRESSURE: 58 MMHG

## 2021-08-14 PROCEDURE — G0300 HHS/HOSPICE OF LPN EA 15 MIN: HCPCS

## 2021-08-16 ENCOUNTER — HOME CARE VISIT (OUTPATIENT)
Dept: SCHEDULING | Facility: HOME HEALTH | Age: 66
End: 2021-08-16
Payer: COMMERCIAL

## 2021-08-16 PROCEDURE — G0300 HHS/HOSPICE OF LPN EA 15 MIN: HCPCS

## 2021-08-17 VITALS
DIASTOLIC BLOOD PRESSURE: 68 MMHG | TEMPERATURE: 97.9 F | WEIGHT: 134.7 LBS | HEART RATE: 77 BPM | OXYGEN SATURATION: 97 % | RESPIRATION RATE: 17 BRPM | SYSTOLIC BLOOD PRESSURE: 128 MMHG | BODY MASS INDEX: 25.45 KG/M2

## 2021-08-18 ENCOUNTER — HOME CARE VISIT (OUTPATIENT)
Dept: SCHEDULING | Facility: HOME HEALTH | Age: 66
End: 2021-08-18
Payer: COMMERCIAL

## 2021-08-18 VITALS
BODY MASS INDEX: 25.21 KG/M2 | SYSTOLIC BLOOD PRESSURE: 115 MMHG | OXYGEN SATURATION: 100 % | TEMPERATURE: 98 F | WEIGHT: 133.4 LBS | HEART RATE: 67 BPM | RESPIRATION RATE: 17 BRPM | DIASTOLIC BLOOD PRESSURE: 64 MMHG

## 2021-08-18 PROCEDURE — G0300 HHS/HOSPICE OF LPN EA 15 MIN: HCPCS

## 2021-08-20 ENCOUNTER — HOME CARE VISIT (OUTPATIENT)
Dept: SCHEDULING | Facility: HOME HEALTH | Age: 66
End: 2021-08-20
Payer: COMMERCIAL

## 2021-08-20 PROCEDURE — G0299 HHS/HOSPICE OF RN EA 15 MIN: HCPCS

## 2021-08-23 VITALS
DIASTOLIC BLOOD PRESSURE: 70 MMHG | RESPIRATION RATE: 18 BRPM | SYSTOLIC BLOOD PRESSURE: 116 MMHG | OXYGEN SATURATION: 98 % | HEART RATE: 76 BPM | TEMPERATURE: 98 F

## 2021-09-01 ENCOUNTER — OFFICE VISIT (OUTPATIENT)
Dept: CARDIOTHORACIC SURGERY | Age: 66
End: 2021-09-01
Payer: COMMERCIAL

## 2021-09-01 VITALS
SYSTOLIC BLOOD PRESSURE: 126 MMHG | TEMPERATURE: 98.4 F | HEART RATE: 72 BPM | DIASTOLIC BLOOD PRESSURE: 62 MMHG | OXYGEN SATURATION: 99 %

## 2021-09-01 DIAGNOSIS — Z95.1 S/P CABG X 2: Primary | ICD-10-CM

## 2021-09-01 PROCEDURE — 99024 POSTOP FOLLOW-UP VISIT: CPT | Performed by: PHYSICIAN ASSISTANT

## 2021-09-01 NOTE — PROGRESS NOTES
Patient: Nicole Hill   Age: 63 y.o. Patient Care Team:  Mira Benton MD as PCP - General (Family Medicine)  Bal Cardoza MD (Cardiology)  Sadi Laguerre MD (Cardiothoracic Surgery)    Diagnosis: The encounter diagnosis was S/P CABG x 2. Problem List:   Patient Active Problem List   Diagnosis Code    CAD (coronary artery disease) I25.10    S/P CABG x 2 Z95.1           Date of Surgery: 8/1/21     Surgery: CABG x2    HPI:  Pt is here for post op follow up, seen with Dr. Lucy Chamberlain. Still c/o lightheadedness when standing. No Falls. Walking for exercise. Eating well. Moving bowels. No wound issues. Current Medications:   Current Outpatient Medications   Medication Sig Dispense Refill    ondansetron (ZOFRAN ODT) 8 mg disintegrating tablet Take 0.5 Tablets by mouth every eight (8) hours as needed for Nausea or Vomiting. (Patient not taking: Reported on 8/13/2021) 8 Tablet 0    polyethylene glycol (Miralax) 17 gram/dose powder Take 17 g by mouth two (2) times daily as needed for Constipation. take if no bowel movment within 5 days      metoprolol succinate (TOPROL-XL) 25 mg XL tablet Take 0.5 Tablets by mouth daily. 30 Tablet 1    acetaminophen (TYLENOL) 500 mg tablet Take 2 Tablets by mouth every six (6) hours. 30 Tablet 2    amiodarone (CORDARONE) 200 mg tablet Take two tabs twice daily for 2 weeks. Then take 2 tabs once daily for 2 weeks and then STOP unless otherwise instructed. (Patient taking differently: Take  by mouth. Take two tabs twice daily for 2 weeks. Then take 2 tabs once daily for 2 weeks and then STOP unless otherwise instructed.) 84 Tablet 0    aspirin 81 mg chewable tablet Take 1 Tablet by mouth daily. 30 Tablet 2    atorvastatin (LIPITOR) 40 mg tablet Take 1 Tablet by mouth daily. 30 Tablet 2    clopidogreL (PLAVIX) 75 mg tab Take 1 Tablet by mouth daily.  30 Tablet 2    senna-docusate (PERICOLACE) 8.6-50 mg per tablet Take 1 Tablet by mouth daily as needed for Constipation. (Patient taking differently: Take 1 Tablet by mouth daily as needed for Constipation. take if no bowel movement within 3 days ) 30 Tablet 1    levothyroxine (SYNTHROID) 100 mcg tablet Take 100 mcg by mouth Daily (before breakfast).  green tea leaf extract (GREEN TEA PO) Take  by mouth Every morning.  OTHER Take  by mouth every evening. CHAMOMILE TEA         Vitals: There were no vitals taken for this visit. Allergies: has No Known Allergies. Physical Exam:  Wounds: Sternal incision healed well. RLE lower incision with exposed suture. Leg incision prepped with betadine and cut    Lungs: clear    Heart: RRR, S1 & S2, no rub, gallop, murmur    Extremities: RLE wound as above. No edema    Assessment/Plan:   1. Doing well 1 month post CABG. Advised to take her Toprol 12.5 mg at night instead of in the morning and see if the lightheadedness with standing is eliminated. If not, she can stop the Toprol and discuss further with her Cardiologist in 2 weeks. May drive and lift as much as 10 pounds. OK to start cardiac rehab    Pt is ready to start cardiac rehab.      Rehab - yes  Walking: yes  Glucometer: n/a  Antibiotic card for valves: n/a

## 2021-09-07 ENCOUNTER — HOSPITAL ENCOUNTER (OUTPATIENT)
Dept: CARDIAC REHAB | Age: 66
Discharge: HOME | End: 2021-09-07
Payer: COMMERCIAL

## 2021-09-07 PROCEDURE — 93798 PHYS/QHP OP CAR RHAB W/ECG: CPT

## 2021-09-07 NOTE — CARDIO/PULMONARY
Cardiopulmonary Rehab Nursing Entry:     Patient reported for cardiac rehab orientation. Patient is a 63year old referred by Dr. Justice Hudson for strengthening and conditioning. Pt s/p CABG 08/01/2021. PMHx significant for high cholesterol and  hypothyroidism. EF 52%. Psychosocial: Patient is  and a homemaker. She has two adult children that can help her if needed. PHQ 9 score of 7 was faxed to her PCP. She will work out twice a week and attend classes as well as meet with the dietitian. Pt completed the 6 minute walk test on the treadmill without stops walking 340m, 2.7mets. SR  BS CTA  Pulse ox 98% RA  No pedal edema  VSS     Goals as follows:  Improve Rate Your Plate score  Able to walk the same distance as before her bypass. Sleep through the night    Pt was given CR book and was scheduled to begin classes and exercise.

## 2021-09-09 VITALS — BODY MASS INDEX: 25.49 KG/M2 | HEIGHT: 61 IN | WEIGHT: 135 LBS

## 2021-09-13 ENCOUNTER — HOSPITAL ENCOUNTER (OUTPATIENT)
Dept: CARDIAC REHAB | Age: 66
Discharge: HOME | End: 2021-09-13
Payer: COMMERCIAL

## 2021-09-13 VITALS — WEIGHT: 135.7 LBS | BODY MASS INDEX: 25.64 KG/M2

## 2021-09-13 PROCEDURE — 93798 PHYS/QHP OP CAR RHAB W/ECG: CPT

## 2021-09-13 PROCEDURE — 93797 PHYS/QHP OP CAR RHAB WO ECG: CPT

## 2021-09-17 ENCOUNTER — HOSPITAL ENCOUNTER (OUTPATIENT)
Dept: CARDIAC REHAB | Age: 66
Discharge: HOME | End: 2021-09-17
Payer: COMMERCIAL

## 2021-09-17 VITALS — WEIGHT: 133.4 LBS | BODY MASS INDEX: 25.21 KG/M2

## 2021-09-17 PROCEDURE — 93798 PHYS/QHP OP CAR RHAB W/ECG: CPT

## 2021-09-20 ENCOUNTER — APPOINTMENT (OUTPATIENT)
Dept: CARDIAC REHAB | Age: 66
End: 2021-09-20
Payer: COMMERCIAL

## 2021-09-20 ENCOUNTER — HOSPITAL ENCOUNTER (OUTPATIENT)
Dept: CARDIAC REHAB | Age: 66
Discharge: HOME | End: 2021-09-20
Payer: COMMERCIAL

## 2021-09-20 VITALS — BODY MASS INDEX: 25.21 KG/M2 | WEIGHT: 133.4 LBS

## 2021-09-20 PROCEDURE — 93798 PHYS/QHP OP CAR RHAB W/ECG: CPT

## 2021-09-20 PROCEDURE — 93797 PHYS/QHP OP CAR RHAB WO ECG: CPT

## 2021-09-20 NOTE — CARDIO/PULMONARY
Cardiac Rehab Nutrition Assessment - 1:1 Evaluation         NAME: Hebert Costa : 1955 AGE: 63 y.o. GENDER: female  CARDIAC REHAB ADMITTING DIAGNOSIS: s/p CABG (2021)    PMHx significant for high cholesterol, hypothyroidism    PROBLEM LIST:  Patient Active Problem List   Diagnosis Code    CAD (coronary artery disease) I25.10    S/P CABG x 2 Z95.1         LABS:   No results found for: HBA1C, [id number], [id number]  No results found for: CHOL, CHOLPOCT, CHOLX, CHLST, CHOLV, HDL, HDLPOC, HDLP, LDL, LDLCPOC, LDLC, DLDLP, VLDLC, VLDL, TGLX, TRIGL, TRIGP, TGLPOCT, CHHD, CHHDX      MEDICATIONS/SUPPLEMENTS:   Prior to Admission medications    Medication Sig Start Date End Date Taking? Authorizing Provider   ondansetron (ZOFRAN ODT) 8 mg disintegrating tablet Take 0.5 Tablets by mouth every eight (8) hours as needed for Nausea or Vomiting. Patient not taking: Reported on 2021   Amee Holguin NP   polyethylene glycol (Miralax) 17 gram/dose powder Take 17 g by mouth two (2) times daily as needed for Constipation. take if no bowel movment within 5 days    Provider, Historical   metoprolol succinate (TOPROL-XL) 25 mg XL tablet Take 0.5 Tablets by mouth daily. 21   Amee Holguin NP   acetaminophen (TYLENOL) 500 mg tablet Take 2 Tablets by mouth every six (6) hours. 21   Long, Teresa Ahumada, PA   amiodarone (CORDARONE) 200 mg tablet Take two tabs twice daily for 2 weeks. Then take 2 tabs once daily for 2 weeks and then STOP unless otherwise instructed. Patient not taking: Reported on 2021   CURT Aragon   aspirin 81 mg chewable tablet Take 1 Tablet by mouth daily. 21   Long, Teresa Ahumada, PA   atorvastatin (LIPITOR) 40 mg tablet Take 1 Tablet by mouth daily. 21   Long, Teresa Ahumada, PA   clopidogreL (PLAVIX) 75 mg tab Take 1 Tablet by mouth daily.  21   Long, Teresa Ahumada, PA senna-docusate (PERICOLACE) 8.6-50 mg per tablet Take 1 Tablet by mouth daily as needed for Constipation. Patient taking differently: Take 1 Tablet by mouth daily as needed for Constipation. take if no bowel movement within 3 days  8/5/21   CURT Mckinney   levothyroxine (SYNTHROID) 100 mcg tablet Take 100 mcg by mouth Daily (before breakfast). Provider, Historical   green tea leaf extract (GREEN TEA PO) Take  by mouth Every morning. Provider, Historical   OTHER Take  by mouth every evening. CHAMOMILE TEA    Provider, Historical       ANTHROPOMETRICS:    Ht Readings from Last 1 Encounters:   09/07/21 5' 1\" (1.549 m)      Wt Readings from Last 10 Encounters:   09/17/21 60.5 kg (133 lb 6.4 oz)   09/13/21 61.6 kg (135 lb 11.2 oz)   09/07/21 61.2 kg (135 lb)   08/18/21 60.5 kg (133 lb 6.4 oz)   08/16/21 61.1 kg (134 lb 11.2 oz)   08/14/21 61.1 kg (134 lb 11.2 oz)   08/11/21 61.4 kg (135 lb 6.4 oz)   08/09/21 62.3 kg (137 lb 6.4 oz)   08/01/21 63 kg (139 lb)      IBW:105 # +/- 10%  %IBW: 127 % +/- 10%    BMI: 25.21 kg/M2 Category: overweight  Waist: 30 inches    Reported Wt Hx: Lost 10# since procedure. UBW = 130-133#. Reported Diet Hx: Gluten intolerance     Rate Your Plate Score: 46  (Score 58-72: Making many healthy choices; 41-57: Some choices need improving 24-40: many choices need improving)    24 HOUR DIET RECALL  Breakfast Hard boiled egg    Lunch Grilled chicken salad (1/2): tomato slices, cucumber, ranch dressing    Dinner Ate the other half of salad   Snacks Grapes, fruit cups (in 100% fruit juice); calls herself a cheeto fanatic    Beverages Water, unsweet tea      Sonam Martinez     Environmental/Social: . Walks ~250 steps/hour and gets 6000 steps/day. NUTRITION INTERVENTION:  Nutrition 60 minute one-on-one education & goal setting with [address] with Sonam Martinez relevant labs compared to ideals.     Reviewed weight history and patient's verbalized weight goal as well as any real or perceived barriers to obtaining the goal. Collaborated with patient to set a specific short and long term weight goal.     Reviewed Rate Your Plate and conducted a verbal diet recall. Assessed for environmental, financial, psychosocial, physical and comorbidities that may impact the food and eating patterns / behaviors of Kay Miller    Collaborated with patient to set specific nutrient goals as well as specific food / behavior changes that will help patient meet the overall goal of following a heart healthy eating pattern (using guidelines as set forth by the American Heart Association and modeled after healthful eating patterns as recognized by the USDA Dietary Guidelines such as DASH, Mediterranean or plant-based). Briefly reviewed with Kay Miller the nutrition information in the Cardiac Rehab patient education book and encouraged Kay Velafabiola to read thoroughly, ask questions as needed, and use for future reference for heart healthy nutrition information. Kay Miller is scheduled to participate in Cardiac Rehab group nutrition classes. PATIENT GOALS:    Weight Goals: Wt maintenance     Nutrition Goals:  Daily Recommendations:  Calories: 2472-6064 /day  (using Myranda Roland with AF 1.3-1.4)    Saturated Fat: no more than 8 g/day  Trans Fat: 0 g/day  Sodium: no more than 2000 mg/day  Fruit: 1-2 cups / day  Vegetables: 2 cups/day    Other:  - read and compare food labels  - limit saturated intake, smaller portions of foods high in saturated fat  - watch portions of high calorie foods  - try 1 egg + egg white method      Keeping a food diary was recommended. Questions addressed. Follow-up plans discussed. Kay Miller verbalized understanding.             Jose Harris RD

## 2021-09-24 ENCOUNTER — HOSPITAL ENCOUNTER (OUTPATIENT)
Dept: CARDIAC REHAB | Age: 66
Discharge: HOME | End: 2021-09-24
Payer: COMMERCIAL

## 2021-09-24 VITALS — BODY MASS INDEX: 25.73 KG/M2 | WEIGHT: 136.2 LBS

## 2021-09-24 PROCEDURE — 93798 PHYS/QHP OP CAR RHAB W/ECG: CPT

## 2021-09-27 ENCOUNTER — HOSPITAL ENCOUNTER (OUTPATIENT)
Dept: CARDIAC REHAB | Age: 66
Discharge: HOME | End: 2021-09-27
Payer: COMMERCIAL

## 2021-09-27 VITALS — BODY MASS INDEX: 25.39 KG/M2 | WEIGHT: 134.4 LBS

## 2021-09-27 PROCEDURE — 93798 PHYS/QHP OP CAR RHAB W/ECG: CPT

## 2021-09-27 PROCEDURE — 93797 PHYS/QHP OP CAR RHAB WO ECG: CPT

## 2021-10-01 ENCOUNTER — HOSPITAL ENCOUNTER (OUTPATIENT)
Dept: CARDIAC REHAB | Age: 66
Discharge: HOME | End: 2021-10-01
Payer: COMMERCIAL

## 2021-10-01 VITALS — WEIGHT: 134 LBS | BODY MASS INDEX: 25.32 KG/M2

## 2021-10-01 PROCEDURE — 93798 PHYS/QHP OP CAR RHAB W/ECG: CPT

## 2021-10-04 ENCOUNTER — HOSPITAL ENCOUNTER (OUTPATIENT)
Dept: CARDIAC REHAB | Age: 66
Discharge: HOME | End: 2021-10-04
Payer: COMMERCIAL

## 2021-10-04 VITALS — BODY MASS INDEX: 25.39 KG/M2 | WEIGHT: 134.4 LBS

## 2021-10-04 PROCEDURE — 93798 PHYS/QHP OP CAR RHAB W/ECG: CPT

## 2021-10-04 PROCEDURE — 93797 PHYS/QHP OP CAR RHAB WO ECG: CPT

## 2021-10-08 ENCOUNTER — HOSPITAL ENCOUNTER (OUTPATIENT)
Dept: CARDIAC REHAB | Age: 66
Discharge: HOME | End: 2021-10-08
Payer: COMMERCIAL

## 2021-10-08 VITALS — WEIGHT: 135.4 LBS | BODY MASS INDEX: 25.58 KG/M2

## 2021-10-08 PROCEDURE — 93798 PHYS/QHP OP CAR RHAB W/ECG: CPT

## 2021-10-11 ENCOUNTER — HOSPITAL ENCOUNTER (OUTPATIENT)
Dept: CARDIAC REHAB | Age: 66
Discharge: HOME | End: 2021-10-11
Payer: COMMERCIAL

## 2021-10-11 PROCEDURE — 93798 PHYS/QHP OP CAR RHAB W/ECG: CPT

## 2021-10-11 PROCEDURE — 93797 PHYS/QHP OP CAR RHAB WO ECG: CPT

## 2021-10-15 ENCOUNTER — HOSPITAL ENCOUNTER (OUTPATIENT)
Dept: CARDIAC REHAB | Age: 66
Discharge: HOME | End: 2021-10-15
Payer: COMMERCIAL

## 2021-10-15 VITALS — BODY MASS INDEX: 25.28 KG/M2 | WEIGHT: 133.8 LBS

## 2021-10-15 VITALS — WEIGHT: 134.6 LBS | BODY MASS INDEX: 25.43 KG/M2

## 2021-10-15 PROCEDURE — 93798 PHYS/QHP OP CAR RHAB W/ECG: CPT

## 2021-10-18 ENCOUNTER — HOSPITAL ENCOUNTER (OUTPATIENT)
Dept: CARDIAC REHAB | Age: 66
Discharge: HOME | End: 2021-10-18
Payer: COMMERCIAL

## 2021-10-18 VITALS — WEIGHT: 133 LBS | BODY MASS INDEX: 25.13 KG/M2

## 2021-10-18 PROCEDURE — 93798 PHYS/QHP OP CAR RHAB W/ECG: CPT

## 2021-10-18 PROCEDURE — 93797 PHYS/QHP OP CAR RHAB WO ECG: CPT

## 2021-10-22 ENCOUNTER — HOSPITAL ENCOUNTER (OUTPATIENT)
Dept: CARDIAC REHAB | Age: 66
Discharge: HOME | End: 2021-10-22
Payer: COMMERCIAL

## 2021-10-22 VITALS — BODY MASS INDEX: 25.09 KG/M2 | WEIGHT: 132.8 LBS

## 2021-10-22 PROCEDURE — 93798 PHYS/QHP OP CAR RHAB W/ECG: CPT

## 2021-10-25 ENCOUNTER — HOSPITAL ENCOUNTER (OUTPATIENT)
Dept: CARDIAC REHAB | Age: 66
Discharge: HOME | End: 2021-10-25
Payer: COMMERCIAL

## 2021-10-25 VITALS — BODY MASS INDEX: 25.09 KG/M2 | WEIGHT: 132.8 LBS

## 2021-10-25 PROCEDURE — 93798 PHYS/QHP OP CAR RHAB W/ECG: CPT

## 2021-10-25 PROCEDURE — 93797 PHYS/QHP OP CAR RHAB WO ECG: CPT

## 2021-10-29 ENCOUNTER — HOSPITAL ENCOUNTER (OUTPATIENT)
Dept: CARDIAC REHAB | Age: 66
Discharge: HOME | End: 2021-10-29
Payer: COMMERCIAL

## 2021-10-29 VITALS — BODY MASS INDEX: 25.05 KG/M2 | WEIGHT: 132.6 LBS

## 2021-10-29 PROCEDURE — 93798 PHYS/QHP OP CAR RHAB W/ECG: CPT

## 2021-11-01 ENCOUNTER — HOSPITAL ENCOUNTER (OUTPATIENT)
Dept: CARDIAC REHAB | Age: 66
Discharge: HOME | End: 2021-11-01
Payer: COMMERCIAL

## 2021-11-01 VITALS — BODY MASS INDEX: 25.05 KG/M2 | WEIGHT: 132.6 LBS

## 2021-11-01 PROCEDURE — 93798 PHYS/QHP OP CAR RHAB W/ECG: CPT

## 2021-11-01 PROCEDURE — 93797 PHYS/QHP OP CAR RHAB WO ECG: CPT

## 2021-11-05 ENCOUNTER — HOSPITAL ENCOUNTER (OUTPATIENT)
Dept: CARDIAC REHAB | Age: 66
Discharge: HOME | End: 2021-11-05
Payer: COMMERCIAL

## 2021-11-05 VITALS — BODY MASS INDEX: 24.83 KG/M2 | WEIGHT: 131.4 LBS

## 2021-11-05 PROCEDURE — 93798 PHYS/QHP OP CAR RHAB W/ECG: CPT

## 2021-11-08 ENCOUNTER — HOSPITAL ENCOUNTER (OUTPATIENT)
Dept: CARDIAC REHAB | Age: 66
Discharge: HOME | End: 2021-11-08
Payer: COMMERCIAL

## 2021-11-08 VITALS — WEIGHT: 131.6 LBS | BODY MASS INDEX: 24.87 KG/M2

## 2021-11-08 PROCEDURE — 93798 PHYS/QHP OP CAR RHAB W/ECG: CPT

## 2021-11-08 PROCEDURE — 93797 PHYS/QHP OP CAR RHAB WO ECG: CPT

## 2021-11-12 ENCOUNTER — HOSPITAL ENCOUNTER (OUTPATIENT)
Dept: CARDIAC REHAB | Age: 66
Discharge: HOME | End: 2021-11-12
Payer: COMMERCIAL

## 2021-11-12 VITALS — WEIGHT: 131.6 LBS | BODY MASS INDEX: 24.87 KG/M2

## 2021-11-12 PROCEDURE — 93798 PHYS/QHP OP CAR RHAB W/ECG: CPT

## 2021-11-15 ENCOUNTER — HOSPITAL ENCOUNTER (OUTPATIENT)
Dept: CARDIAC REHAB | Age: 66
Discharge: HOME | End: 2021-11-15
Payer: COMMERCIAL

## 2021-11-15 VITALS — WEIGHT: 130.8 LBS | BODY MASS INDEX: 24.71 KG/M2

## 2021-11-15 PROCEDURE — 93797 PHYS/QHP OP CAR RHAB WO ECG: CPT

## 2021-11-15 PROCEDURE — 93798 PHYS/QHP OP CAR RHAB W/ECG: CPT

## 2021-11-19 ENCOUNTER — HOSPITAL ENCOUNTER (OUTPATIENT)
Dept: CARDIAC REHAB | Age: 66
Discharge: HOME | End: 2021-11-19
Payer: COMMERCIAL

## 2021-11-19 VITALS — WEIGHT: 131.6 LBS | BODY MASS INDEX: 24.87 KG/M2

## 2021-11-19 PROCEDURE — 93798 PHYS/QHP OP CAR RHAB W/ECG: CPT

## 2021-11-22 ENCOUNTER — HOSPITAL ENCOUNTER (OUTPATIENT)
Dept: CARDIAC REHAB | Age: 66
Discharge: HOME | End: 2021-11-22
Payer: COMMERCIAL

## 2021-11-22 VITALS — BODY MASS INDEX: 24.9 KG/M2 | WEIGHT: 131.8 LBS

## 2021-11-22 PROCEDURE — 93798 PHYS/QHP OP CAR RHAB W/ECG: CPT

## 2021-11-22 PROCEDURE — 93797 PHYS/QHP OP CAR RHAB WO ECG: CPT

## 2021-11-29 ENCOUNTER — HOSPITAL ENCOUNTER (OUTPATIENT)
Dept: CARDIAC REHAB | Age: 66
Discharge: HOME | End: 2021-11-29
Payer: COMMERCIAL

## 2021-11-29 VITALS — WEIGHT: 130.6 LBS | BODY MASS INDEX: 24.68 KG/M2

## 2021-11-29 PROCEDURE — 93797 PHYS/QHP OP CAR RHAB WO ECG: CPT

## 2021-11-29 PROCEDURE — 93798 PHYS/QHP OP CAR RHAB W/ECG: CPT

## 2021-12-03 ENCOUNTER — HOSPITAL ENCOUNTER (OUTPATIENT)
Dept: CARDIAC REHAB | Age: 66
Discharge: HOME | End: 2021-12-03
Payer: COMMERCIAL

## 2021-12-03 VITALS — WEIGHT: 128.8 LBS | BODY MASS INDEX: 24.34 KG/M2

## 2021-12-03 PROCEDURE — 93798 PHYS/QHP OP CAR RHAB W/ECG: CPT

## 2021-12-03 NOTE — CARDIO/PULMONARY
CP REHAB EXIT NOTE     Mrs. Mira Franco completed 36/36 visits. She was independent in her exercise routine and progressed well while in rehab. Mrs. Mira Franco improved her [place] Activity from 42.7 to a 50.7, [place] improved by 5 points, Rate Your Plate improved 16 points, and PHQ 9 score went to a one from seven. Patient was also able to increase her 6 MWT from 340 meters to 450 meters and her METS improved from a 2.5 to 4.3. Mrs. Mira Franco plans to attend her gym 2-4x/week which will include aerobic exercises.

## 2022-03-18 PROBLEM — I25.10 CAD (CORONARY ARTERY DISEASE): Status: ACTIVE | Noted: 2021-08-01

## 2022-03-18 PROBLEM — Z95.1 S/P CABG X 2: Status: ACTIVE | Noted: 2021-08-01

## 2023-05-10 RX ORDER — POLYETHYLENE GLYCOL 3350 17 G/17G
POWDER, FOR SOLUTION ORAL 2 TIMES DAILY PRN
COMMUNITY

## 2023-05-10 RX ORDER — LEVOTHYROXINE SODIUM 0.1 MG/1
TABLET ORAL
COMMUNITY

## 2023-05-10 RX ORDER — AMOXICILLIN 250 MG
1 CAPSULE ORAL DAILY PRN
COMMUNITY
Start: 2021-08-05

## 2023-05-10 RX ORDER — ACETAMINOPHEN 500 MG
TABLET ORAL EVERY 6 HOURS
COMMUNITY
Start: 2021-08-05

## 2023-05-10 RX ORDER — CLOPIDOGREL BISULFATE 75 MG/1
1 TABLET ORAL DAILY
COMMUNITY
Start: 2021-08-06

## 2023-05-10 RX ORDER — AMIODARONE HYDROCHLORIDE 200 MG/1
TABLET ORAL
COMMUNITY
Start: 2021-08-05

## 2023-05-10 RX ORDER — ONDANSETRON 8 MG/1
TABLET, ORALLY DISINTEGRATING ORAL EVERY 8 HOURS PRN
COMMUNITY
Start: 2021-08-09

## 2023-05-10 RX ORDER — METOPROLOL SUCCINATE 25 MG/1
0.5 TABLET, EXTENDED RELEASE ORAL DAILY
COMMUNITY
Start: 2021-08-07

## 2023-05-10 RX ORDER — ASPIRIN 81 MG/1
1 TABLET, CHEWABLE ORAL DAILY
COMMUNITY
Start: 2021-08-06

## 2023-05-10 RX ORDER — ATORVASTATIN CALCIUM 40 MG/1
1 TABLET, FILM COATED ORAL DAILY
COMMUNITY
Start: 2021-08-06

## (undated) DEVICE — Device

## (undated) DEVICE — COVER,TABLE,HEAVY DUTY,77"X90",STRL: Brand: MEDLINE

## (undated) DEVICE — GLOVE SURG SZ 8 CRM LTX FREE POLYISOPRENE POLYMER BEAD ANTI

## (undated) DEVICE — TUBING, SUCTION, 1/4" X 10', STRAIGHT: Brand: MEDLINE

## (undated) DEVICE — CATHETER IABP 7.5FR 40CC SHTH LAIN DATASCP SYS COMPATIBLE

## (undated) DEVICE — BAG RED 3PLY 2MIL 30X40 IN

## (undated) DEVICE — CENTRAL VENOUS CATHETER SET: Brand: COOK

## (undated) DEVICE — GLOVE SURG SZ 6 THK91MIL LTX FREE SYN POLYISOPRENE ANTI

## (undated) DEVICE — SUTURE TICRON DBL ARMED 2 0 CV 305 42IN BLU N ABSRB BRAID 8886303551

## (undated) DEVICE — GLOVE SURG SZ 75 CRM LTX FREE POLYISOPRENE POLYMER BEAD ANTI

## (undated) DEVICE — STERNUM BLADE, OFFSET (31.7 X 0.64 X 6.3MM)

## (undated) DEVICE — KIT,ANTI FOG,W/SPONGE & FLUID,SOFT PACK: Brand: MEDLINE

## (undated) DEVICE — AORTIC PUNCHES ARE USED TO CREATE A UNIFORM OPENING IN BLOOD VESSELS DURING CARDIOVASCULAR SURGERY. THE VESSEL GRAFT IS INSERTED INTO THE CREATED OPENING AND SUTURED TO THE VESSEL WALL. AORTIC LANCETS ARE USED TO MAKE A SMALL UNIFORM CUT IN A BLOOD VESSEL TO FACILITATE INSERTION OF AN AORTIC PUNCH.  PUNCHES COME IN VARIOUS LENGTHS, DIAMETERS AND TIP CONFIGURATIONS.: Brand: CLEANCUT ROTATING AORTIC PUNCH

## (undated) DEVICE — PRESSURE MONITORING SET: Brand: TRUWAVE

## (undated) DEVICE — DUAL LUMEN STOMACH TUBE MULTI-FUNCTIONAL PORT: Brand: SALEM SUMP

## (undated) DEVICE — SYR ART 700 CLEAR MARK 7 -- ARTERION

## (undated) DEVICE — GOWN,SIRUS,NONRNF,SETINSLV,XL,20/CS: Brand: MEDLINE

## (undated) DEVICE — SYR 10ML LUER LOK 1/5ML GRAD --

## (undated) DEVICE — NEEDLE HYPO 18GA L1.5IN PNK S STL HUB POLYPR SHLD REG BVL

## (undated) DEVICE — TRANSFER PK BLD PROD 300ML --

## (undated) DEVICE — SYR LR LCK 1ML GRAD NSAF 30ML --

## (undated) DEVICE — DRAIN,WOUND,ROUND,24FR,5/16",FULL-FLUTED: Brand: MEDLINE

## (undated) DEVICE — U-SHAPED STYLE, SILICONE (1 PER STERILE PKG): Brand: SCANLAN® A/C LOCATOR® GRAFT MARKER

## (undated) DEVICE — DRAPE PRB US TRNSDCR 6X96IN --

## (undated) DEVICE — SUTURE PROL DBL ARMED 8 0 BV130 5 24IN N ABSRB MFIL BLU M8739

## (undated) DEVICE — SOLUTION IRRIG 1000ML 0.9% SOD CHL USP POUR PLAS BTL

## (undated) DEVICE — CLIP INT SM WIDE RED TI TRNSVRS GRV CHEVRON SHP W PRECIS

## (undated) DEVICE — SUT PROL 4-0 30IN SH1 DA BLU --

## (undated) DEVICE — SPONGE GZ W4XL4IN COT 12 PLY TYP VII WVN C FLD DSGN

## (undated) DEVICE — CATHETER ETER CARD MULTIPAK MULTIPAK 5FR PERFORMA

## (undated) DEVICE — SYR 50ML LR LCK 1ML GRAD NSAF --

## (undated) DEVICE — INTENDED FOR TISSUE SEPARATION, AND OTHER PROCEDURES THAT REQUIRE A SHARP SURGICAL BLADE TO PUNCTURE OR CUT.: Brand: BARD-PARKER ® CARBON RIB-BACK BLADES

## (undated) DEVICE — DRAPE SLUSH DISC W44XL66IN ST FOR RND BSIN HUSH SLUSH SYS

## (undated) DEVICE — VISUALIZATION SYSTEM: Brand: CLEARIFY

## (undated) DEVICE — PREP SKN CHLRAPRP APL 26ML STR --

## (undated) DEVICE — SOLUTION IRRIG 1000ML STRL H2O USP PLAS POUR BTL

## (undated) DEVICE — PACK PROCEDURE SURG HRT CATH

## (undated) DEVICE — LABEL MED CARD MRMC STRL

## (undated) DEVICE — SUTURE PROL SZ 8-0 L24IN NONABSORBABLE BLU L6.5MM BV130-5 8732H

## (undated) DEVICE — SOLUTION IV 1000ML 140MEQ/L SOD 5MEQ/L K 3MEQ/L MG 27MEQ/L

## (undated) DEVICE — REM POLYHESIVE ADULT PATIENT RETURN ELECTRODE: Brand: VALLEYLAB

## (undated) DEVICE — AVID DUAL STAGE VENOUS DRAINAGE CANNULA: Brand: AVID DUAL STAGE VENOUS DRAINAGE CANNULA

## (undated) DEVICE — LUER-LOK 360°: Brand: CONNECTA, LUER-LOK

## (undated) DEVICE — CATHETER ANGIO L100CM OD5FR ID.046IN L2.5CM .038IN PROG R

## (undated) DEVICE — KIT BLWR MISTER 5P 15L W/ TBNG SET IRRIG MIST TO IMPROVE

## (undated) DEVICE — SYR 3ML LL TIP 1/10ML GRAD --

## (undated) DEVICE — SUTURE MCRYL SZ 3-0 L27IN ABSRB UD L24MM PS-1 3/8 CIR PRIM Y936H

## (undated) DEVICE — NEEDLE ENDOSCP BARE TIP 18 GAX70 MM SMARTNEEDLE

## (undated) DEVICE — PINNACLE INTRODUCER SHEATH: Brand: PINNACLE

## (undated) DEVICE — SUTURE PROL SZ 5-0 L30IN NONABSORBABLE BLU L13MM RB-2 1/2 8710H

## (undated) DEVICE — KIT ACCS INTRO 4FR L10CM NDL 21GA L7CM GWIRE L40CM

## (undated) DEVICE — BLADE OPHTH W1.5MM 15DEG ORNG HNDL SHRP SHRP DEL FOR CATRCT

## (undated) DEVICE — SYR 20ML LL STRL LF --

## (undated) DEVICE — SUTURE SZ 7 L18IN NONABSORBABLE SIL CCS L48MM 1/2 CIR STRNM M655G

## (undated) DEVICE — CARD SMRT DISP TRANSIT TIME MEDISTEM VERIQ

## (undated) DEVICE — BLANKET WRM W25XL64IN NONWOVEN SFT LTWT PLIABLE HYPR

## (undated) DEVICE — SYS VSL HARV HEMOPRO2 VASOVIEW -- HARV SYS MINIMUM ORDER 5/EA

## (undated) DEVICE — STERILE POLYISOPRENE POWDER-FREE SURGICAL GLOVES: Brand: PROTEXIS

## (undated) DEVICE — CUSTOM KT PTCA INFL DEV K05 00053H

## (undated) DEVICE — KIT,1200CC CANISTER,3/16"X6' TUBING: Brand: MEDLINE INDUSTRIES, INC.

## (undated) DEVICE — DRESSING SIL W4XL5IN ANTIBACT GELLING FBR CYTOFORM

## (undated) DEVICE — DRSG BORDR MPLX HEEL 8.7X9.1IN --

## (undated) DEVICE — DRAPE FLD WRM W44XL66IN C6L FOR INTRATEMP SYS THERMABASIN

## (undated) DEVICE — SOL INJ SOD CL 0.9% 500ML BG --

## (undated) DEVICE — 6 FOOT DISPOSABLE EXTENSION CABLE WITH SAFE CONNECT / SCREW-DOWN

## (undated) DEVICE — GLOVE SURG SZ 65 THK91MIL LTX FREE SYN POLYISOPRENE

## (undated) DEVICE — SUT PROL 7-0 24IN BV1 DA BLU --

## (undated) DEVICE — CLIP LIG M BLU TI HRT SHP WIRE HORZ 600 PER BX

## (undated) DEVICE — 3M™ TEGADERM™ TRANSPARENT FILM DRESSING FRAME STYLE, 1626W, 4 IN X 4-3/4 IN (10 CM X 12 CM), 50/CT 4CT/CASE: Brand: 3M™ TEGADERM™

## (undated) DEVICE — MEDI-TRACE CADENCE ADULT, DEFIBRILLATION ELECTRODE -RTS  (10 PR/PK) - PHILIPS: Brand: MEDI-TRACE CADENCE

## (undated) DEVICE — KIT ANGIOGRAPHY CUST MRMC

## (undated) DEVICE — 72" ARTERIAL PRESSURE TUBING: Brand: ICU MEDICAL

## (undated) DEVICE — TTL1LYR 16FR10ML 100%SIL TMPST TR: Brand: MEDLINE

## (undated) DEVICE — CONNECTOR TBNG SUCTION 3/8X3/8X3/8 IN

## (undated) DEVICE — GLOVE ORANGE PI 7 1/2   MSG9075

## (undated) DEVICE — TUBE KT ENDFLTN INSUFFLTN SVL --